# Patient Record
Sex: MALE | Race: OTHER | HISPANIC OR LATINO
[De-identification: names, ages, dates, MRNs, and addresses within clinical notes are randomized per-mention and may not be internally consistent; named-entity substitution may affect disease eponyms.]

---

## 2017-06-02 ENCOUNTER — APPOINTMENT (OUTPATIENT)
Dept: OTHER | Facility: CLINIC | Age: 62
End: 2017-06-02

## 2017-06-02 VITALS
SYSTOLIC BLOOD PRESSURE: 134 MMHG | OXYGEN SATURATION: 96 % | DIASTOLIC BLOOD PRESSURE: 83 MMHG | HEIGHT: 70 IN | WEIGHT: 185 LBS | BODY MASS INDEX: 26.48 KG/M2 | HEART RATE: 83 BPM

## 2017-06-02 RX ORDER — NIFEDIPINE 30 MG/1
30 TABLET, FILM COATED, EXTENDED RELEASE ORAL
Qty: 30 | Refills: 0 | Status: DISCONTINUED | COMMUNITY
Start: 2016-12-06

## 2017-06-02 RX ORDER — AZITHROMYCIN 250 MG/1
250 TABLET, FILM COATED ORAL
Qty: 6 | Refills: 0 | Status: DISCONTINUED | COMMUNITY
Start: 2017-03-02

## 2017-06-07 LAB
ALBUMIN SERPL ELPH-MCNC: 4.5 G/DL
ALP BLD-CCNC: 98 U/L
ALT SERPL-CCNC: 21 U/L
ANION GAP SERPL CALC-SCNC: 14 MMOL/L
APPEARANCE: ABNORMAL
AST SERPL-CCNC: 18 U/L
BASOPHILS # BLD AUTO: 0.02 K/UL
BASOPHILS NFR BLD AUTO: 0.3 %
BILIRUB SERPL-MCNC: 0.8 MG/DL
BILIRUBIN URINE: NEGATIVE
BLOOD URINE: NEGATIVE
BUN SERPL-MCNC: 18 MG/DL
CALCIUM SERPL-MCNC: 9.8 MG/DL
CHLORIDE SERPL-SCNC: 99 MMOL/L
CHOLEST SERPL-MCNC: 152 MG/DL
CHOLEST/HDLC SERPL: 2.2 RATIO
CO2 SERPL-SCNC: 27 MMOL/L
COLOR: YELLOW
CREAT SERPL-MCNC: 1.05 MG/DL
EOSINOPHIL # BLD AUTO: 0.04 K/UL
EOSINOPHIL NFR BLD AUTO: 0.7 %
GLUCOSE QUALITATIVE U: NORMAL MG/DL
GLUCOSE SERPL-MCNC: 194 MG/DL
HCT VFR BLD CALC: 47.4 %
HDLC SERPL-MCNC: 69 MG/DL
HGB BLD-MCNC: 15.8 G/DL
IMM GRANULOCYTES NFR BLD AUTO: 0.2 %
KETONES URINE: NEGATIVE
LDLC SERPL CALC-MCNC: 61 MG/DL
LEUKOCYTE ESTERASE URINE: ABNORMAL
LYMPHOCYTES # BLD AUTO: 1.8 K/UL
LYMPHOCYTES NFR BLD AUTO: 30.1 %
MAN DIFF?: NORMAL
MCHC RBC-ENTMCNC: 31.4 PG
MCHC RBC-ENTMCNC: 33.3 GM/DL
MCV RBC AUTO: 94.2 FL
MONOCYTES # BLD AUTO: 0.55 K/UL
MONOCYTES NFR BLD AUTO: 9.2 %
NEUTROPHILS # BLD AUTO: 3.57 K/UL
NEUTROPHILS NFR BLD AUTO: 59.5 %
NITRITE URINE: NEGATIVE
PH URINE: 5.5
PLATELET # BLD AUTO: 262 K/UL
POTASSIUM SERPL-SCNC: 4.5 MMOL/L
PROT SERPL-MCNC: 7.1 G/DL
PROTEIN URINE: NEGATIVE MG/DL
RBC # BLD: 5.03 M/UL
RBC # FLD: 13.6 %
SODIUM SERPL-SCNC: 140 MMOL/L
SPECIFIC GRAVITY URINE: 1.02
TRIGL SERPL-MCNC: 111 MG/DL
UROBILINOGEN URINE: NORMAL MG/DL
WBC # FLD AUTO: 5.99 K/UL

## 2017-11-14 ENCOUNTER — APPOINTMENT (OUTPATIENT)
Dept: PSYCHIATRY | Facility: CLINIC | Age: 62
End: 2017-11-14
Payer: COMMERCIAL

## 2017-11-14 PROCEDURE — 90834 PSYTX W PT 45 MINUTES: CPT

## 2018-11-12 ENCOUNTER — APPOINTMENT (OUTPATIENT)
Dept: OTHER | Facility: CLINIC | Age: 63
End: 2018-11-12
Payer: COMMERCIAL

## 2018-11-12 VITALS
HEART RATE: 56 BPM | BODY MASS INDEX: 27.2 KG/M2 | RESPIRATION RATE: 16 BRPM | HEIGHT: 70 IN | WEIGHT: 190 LBS | OXYGEN SATURATION: 96 % | DIASTOLIC BLOOD PRESSURE: 94 MMHG | SYSTOLIC BLOOD PRESSURE: 152 MMHG

## 2018-11-12 VITALS — SYSTOLIC BLOOD PRESSURE: 150 MMHG | DIASTOLIC BLOOD PRESSURE: 86 MMHG

## 2018-11-12 PROCEDURE — 94010 BREATHING CAPACITY TEST: CPT

## 2018-11-12 PROCEDURE — 99214 OFFICE O/P EST MOD 30 MIN: CPT | Mod: 25

## 2018-11-12 PROCEDURE — 99396 PREV VISIT EST AGE 40-64: CPT | Mod: 25

## 2018-11-12 PROCEDURE — 96150: CPT

## 2018-11-13 LAB
ALBUMIN SERPL ELPH-MCNC: 4.5 G/DL
ALP BLD-CCNC: 109 U/L
ALT SERPL-CCNC: 16 U/L
ANION GAP SERPL CALC-SCNC: 10 MMOL/L
APPEARANCE: CLEAR
AST SERPL-CCNC: 15 U/L
BACTERIA: NEGATIVE
BASOPHILS # BLD AUTO: 0.03 K/UL
BASOPHILS NFR BLD AUTO: 0.4 %
BILIRUB SERPL-MCNC: 0.6 MG/DL
BILIRUBIN URINE: NEGATIVE
BLOOD URINE: NEGATIVE
BUN SERPL-MCNC: 13 MG/DL
CALCIUM SERPL-MCNC: 9.8 MG/DL
CHLORIDE SERPL-SCNC: 103 MMOL/L
CHOLEST SERPL-MCNC: 166 MG/DL
CHOLEST/HDLC SERPL: 2.9 RATIO
CO2 SERPL-SCNC: 28 MMOL/L
COLOR: YELLOW
CREAT SERPL-MCNC: 1 MG/DL
EOSINOPHIL # BLD AUTO: 0.12 K/UL
EOSINOPHIL NFR BLD AUTO: 1.6 %
GLUCOSE QUALITATIVE U: NEGATIVE MG/DL
GLUCOSE SERPL-MCNC: 87 MG/DL
HCT VFR BLD CALC: 46.8 %
HDLC SERPL-MCNC: 57 MG/DL
HGB BLD-MCNC: 16 G/DL
HYALINE CASTS: 3 /LPF
IMM GRANULOCYTES NFR BLD AUTO: 0.3 %
KETONES URINE: NEGATIVE
LDLC SERPL CALC-MCNC: 75 MG/DL
LEUKOCYTE ESTERASE URINE: ABNORMAL
LYMPHOCYTES # BLD AUTO: 2.48 K/UL
LYMPHOCYTES NFR BLD AUTO: 32.6 %
MAN DIFF?: NORMAL
MCHC RBC-ENTMCNC: 31.9 PG
MCHC RBC-ENTMCNC: 34.2 GM/DL
MCV RBC AUTO: 93.4 FL
MICROSCOPIC-UA: NORMAL
MONOCYTES # BLD AUTO: 0.74 K/UL
MONOCYTES NFR BLD AUTO: 9.7 %
NEUTROPHILS # BLD AUTO: 4.22 K/UL
NEUTROPHILS NFR BLD AUTO: 55.4 %
NITRITE URINE: NEGATIVE
PH URINE: 6
PLATELET # BLD AUTO: 253 K/UL
POTASSIUM SERPL-SCNC: 4.8 MMOL/L
PROT SERPL-MCNC: 7.1 G/DL
PROTEIN URINE: NEGATIVE MG/DL
RBC # BLD: 5.01 M/UL
RBC # FLD: 14.3 %
RED BLOOD CELLS URINE: 2 /HPF
SODIUM SERPL-SCNC: 141 MMOL/L
SPECIFIC GRAVITY URINE: 1.02
SQUAMOUS EPITHELIAL CELLS: 2 /HPF
TRIGL SERPL-MCNC: 170 MG/DL
UROBILINOGEN URINE: NEGATIVE MG/DL
WBC # FLD AUTO: 7.61 K/UL
WHITE BLOOD CELLS URINE: 4 /HPF

## 2018-12-04 ENCOUNTER — FORM ENCOUNTER (OUTPATIENT)
Age: 63
End: 2018-12-04

## 2019-06-19 ENCOUNTER — FORM ENCOUNTER (OUTPATIENT)
Age: 64
End: 2019-06-19

## 2020-04-03 ENCOUNTER — APPOINTMENT (OUTPATIENT)
Dept: OTHER | Facility: CLINIC | Age: 65
End: 2020-04-03
Payer: COMMERCIAL

## 2020-04-03 PROCEDURE — 99396 PREV VISIT EST AGE 40-64: CPT

## 2020-04-03 PROCEDURE — 99441: CPT

## 2020-04-03 RX ORDER — SERTRALINE HYDROCHLORIDE 25 MG/1
TABLET, FILM COATED ORAL
Refills: 0 | Status: DISCONTINUED | COMMUNITY
End: 2020-04-03

## 2020-04-03 RX ORDER — ALBUTEROL 90 MCG
90 AEROSOL (GRAM) INHALATION
Refills: 0 | Status: DISCONTINUED | COMMUNITY
End: 2020-04-03

## 2020-04-03 NOTE — HEALTH RISK ASSESSMENT
[Patient reported colonoscopy was abnormal] : Patient reported colonoscopy was abnormal [ColonoscopyDate] : 2015

## 2020-04-03 NOTE — DISCUSSION/SUMMARY
[Patient seen for WTC Monitoring ___] : Patient was seen for WTC monitoring [unfilled] [Please See Note in Chart and Documentation in Trial DB] : Please see note in chart and documentation in Trial DB. [FreeTextEntry3] :  this encounter was conducted over  on the telephone during time of COVID 19 pandemic \par \par PCP: Dr. Ti Penny in \par Cards: Dr. 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 \par \par PMH/PSH: CAD s/p stent; EGD 2011\par Family History: no family hx of colon cancer \par Preventive Screening: last colonoscopy at age 59, had TA, need to repeat \par Allergies: NKDA\par Meds: see trial db and allscripts \par Soc Hx: 2 kids in good health \par Smoking Status: never \par Review of Systems—IAMQ reviewed with patient\par \par PE: to be performed at a later date during face to face encounter to complete WTC monitoring visit \par \par \par Results:\par \par CXR: to be refered  at a later date during face to face encounter to complete WTC monitoring visit\par \par \par Spirometry: this encounter was conducted over  on the telephone during time of COVID 19 pandemic\par \par \par A/P: CBC, CMP, lipids, UA ordered, agreeable to colonoscopy;\par see Occ Med note for details \par

## 2020-04-21 ENCOUNTER — TRANSCRIPTION ENCOUNTER (OUTPATIENT)
Age: 65
End: 2020-04-21

## 2020-07-31 ENCOUNTER — APPOINTMENT (OUTPATIENT)
Dept: GASTROENTEROLOGY | Facility: CLINIC | Age: 65
End: 2020-07-31

## 2020-11-30 NOTE — PAST MEDICAL HISTORY
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

## 2020-11-30 NOTE — REASON FOR VISIT
[Follow-Up] : a follow-up visit [FreeTextEntry1] : asthma and GERD - certified by [employer] as WTC related

## 2020-11-30 NOTE — HISTORY OF PRESENT ILLNESS
[Home] : at home, [unfilled] , at the time of the visit. [Other Location: e.g. Home (Enter Location, City,State)___] : at [unfilled] [FreeTextEntry1] : asthma - using  Ventolin as needed \par  few times per week \par  GERD - taking medication daily to prevent Sx

## 2020-12-28 ENCOUNTER — APPOINTMENT (OUTPATIENT)
Dept: OTHER | Facility: CLINIC | Age: 65
End: 2020-12-28
Payer: COMMERCIAL

## 2020-12-28 VITALS
WEIGHT: 195 LBS | HEIGHT: 70 IN | TEMPERATURE: 97.2 F | BODY MASS INDEX: 27.92 KG/M2 | SYSTOLIC BLOOD PRESSURE: 166 MMHG | OXYGEN SATURATION: 94 % | DIASTOLIC BLOOD PRESSURE: 95 MMHG | RESPIRATION RATE: 15 BRPM | HEART RATE: 66 BPM

## 2020-12-28 PROCEDURE — 99214 OFFICE O/P EST MOD 30 MIN: CPT

## 2020-12-28 RX ORDER — PNEUMOCOCCAL 23-VAL P-SAC VAC 25MCG/0.5
25 VIAL (ML) INJECTION
Qty: 1 | Refills: 0 | Status: ACTIVE | COMMUNITY
Start: 2020-09-17

## 2020-12-28 NOTE — HISTORY OF PRESENT ILLNESS
[FreeTextEntry1] : asthma - using  Ventolin as needed 2-3 times per day for chest tightness \par  worse lately since dry hear in the house i son \par no cough \par  nonwheezing \par \par worsening of  GERD - since not  taking medication daily to prevent Sx, martinez at night \par  that causing chest tightness too \par \par  no diarrhea, \par  no n/v\par  no dysphagia colonoscopy 04 2015 - had TA \par \par  never had ER/UC visits for asthma \par  never treated with PO steroids for asthma

## 2020-12-28 NOTE — ASSESSMENT
[FreeTextEntry1] : worsening asthma- cont PRN Ventolin \par  add Breo daily \par \par GERD- meds renewed \par  rec to take daily for 6 weeks and then will re assess \par diet and weight loss discussed \par \par high BP - recommend to follow up with his PCP or cardiologist to control better

## 2021-01-12 LAB
ALBUMIN SERPL ELPH-MCNC: 4.7 G/DL
ALP BLD-CCNC: 104 U/L
ALT SERPL-CCNC: 19 U/L
ANION GAP SERPL CALC-SCNC: 11 MMOL/L
APPEARANCE: CLEAR
AST SERPL-CCNC: 16 U/L
BACTERIA: NEGATIVE
BASOPHILS # BLD AUTO: 0.06 K/UL
BASOPHILS NFR BLD AUTO: 0.6 %
BILIRUB SERPL-MCNC: 0.5 MG/DL
BILIRUBIN URINE: NEGATIVE
BLOOD URINE: NEGATIVE
BUN SERPL-MCNC: 23 MG/DL
CALCIUM SERPL-MCNC: 10 MG/DL
CHLORIDE SERPL-SCNC: 102 MMOL/L
CHOLEST SERPL-MCNC: 175 MG/DL
CO2 SERPL-SCNC: 29 MMOL/L
COLOR: YELLOW
CREAT SERPL-MCNC: 1.28 MG/DL
EOSINOPHIL # BLD AUTO: 0.1 K/UL
EOSINOPHIL NFR BLD AUTO: 1 %
GLUCOSE QUALITATIVE U: NEGATIVE
GLUCOSE SERPL-MCNC: 85 MG/DL
HCT VFR BLD CALC: 53.4 %
HDLC SERPL-MCNC: 57 MG/DL
HGB BLD-MCNC: 17.1 G/DL
HYALINE CASTS: 2 /LPF
IMM GRANULOCYTES NFR BLD AUTO: 0.4 %
KETONES URINE: NEGATIVE
LDLC SERPL CALC-MCNC: 46 MG/DL
LEUKOCYTE ESTERASE URINE: NEGATIVE
LYMPHOCYTES # BLD AUTO: 2.75 K/UL
LYMPHOCYTES NFR BLD AUTO: 27 %
MAN DIFF?: NORMAL
MCHC RBC-ENTMCNC: 31.7 PG
MCHC RBC-ENTMCNC: 32 GM/DL
MCV RBC AUTO: 98.9 FL
MICROSCOPIC-UA: NORMAL
MONOCYTES # BLD AUTO: 1.16 K/UL
MONOCYTES NFR BLD AUTO: 11.4 %
NEUTROPHILS # BLD AUTO: 6.08 K/UL
NEUTROPHILS NFR BLD AUTO: 59.6 %
NITRITE URINE: NEGATIVE
NONHDLC SERPL-MCNC: 118 MG/DL
PH URINE: 6
PLATELET # BLD AUTO: 305 K/UL
POTASSIUM SERPL-SCNC: 4.6 MMOL/L
PROT SERPL-MCNC: 7.4 G/DL
PROTEIN URINE: NORMAL
RBC # BLD: 5.4 M/UL
RBC # FLD: 13.3 %
RED BLOOD CELLS URINE: 2 /HPF
SODIUM SERPL-SCNC: 141 MMOL/L
SPECIFIC GRAVITY URINE: 1.02
SQUAMOUS EPITHELIAL CELLS: 2 /HPF
TRIGL SERPL-MCNC: 363 MG/DL
UROBILINOGEN URINE: NORMAL
WBC # FLD AUTO: 10.19 K/UL
WHITE BLOOD CELLS URINE: 13 /HPF

## 2021-02-11 ENCOUNTER — APPOINTMENT (OUTPATIENT)
Dept: OTHER | Facility: CLINIC | Age: 66
End: 2021-02-11
Payer: COMMERCIAL

## 2021-02-11 PROCEDURE — 99442: CPT | Mod: 95

## 2021-02-11 RX ORDER — FLUTICASONE FUROATE AND VILANTEROL TRIFENATATE 100; 25 UG/1; UG/1
100-25 POWDER RESPIRATORY (INHALATION)
Qty: 1 | Refills: 1 | Status: DISCONTINUED | COMMUNITY
Start: 2020-12-28 | End: 2021-02-11

## 2021-02-11 NOTE — HISTORY OF PRESENT ILLNESS
[Home] : at home, [unfilled] , at the time of the visit. [Medical Office: ([facility])___] : at the medical office located in  [Verbal consent obtained from patient] : the patient, [unfilled] [FreeTextEntry1] : asthma - using  Ventolin as needed 2  times per day for chest tightness \par  used Breo for 3 days but had very high BP and he stopped \par  his BP is not well controlled in General \par \par started taking Omeprazole for   GERD \par  but feels that  chest tightnness is worse while laying on left side \par  no diarrhea, \par  no n/v\par  no dysphagia \par \par \par colonoscopy 04 2015 - had TA \par \par  never had ER/UC visits for asthma \par  never treated with PO steroids for asthma

## 2021-02-11 NOTE — ASSESSMENT
[FreeTextEntry1] : worsening asthma- cont PRN Ventolin \par  stated that when started on  Breo daily - had uncontrolled BP \par is BP was elevated last visit \par  he stated that he has a follow up tomorrow with PCP re BP control and HTN treatment \par \par GERD- meds \par  rec to take daily OMeprazole \par diet and weight loss discussed \par GERD control with diet, weight loss and life style changes discussed \par \par  Foods and drinks that have been commonly linked to GERD symptoms include;\par \par •acidic foods, such as citrus fruits and tomatoes\par •alcoholic drinks\par •chocolate\par •coffee and other sources of caffeine\par •high-fat foods\par •mint\par •spicy foods\par Carbonated beverages \par Lifestyle Changes\par \par Avoid lying down for at least two hours after a meal or after drinking acidic beverages, like soda, or other caffeinated beverages. This can help to prevent stomach contents from flowing back into the esophagus. \par Keep your head elevated while you sleep. Using an extra pillow or two can also help to prevent reflux. \par Eat smaller and more frequent meals each day instead of a few large meals. This promotes digestion and can aid in preventing heartburn. \par Wear loose-fitting clothes to ease pressure on the stomach, which can worsen heartburn and reflux.\par \par Reduce excess weight around the midsection. This can ease pressure on the stomach. Such pressure can force some stomach contents back up the esophagus\par \par \par

## 2021-02-28 ENCOUNTER — APPOINTMENT (OUTPATIENT)
Dept: DISASTER EMERGENCY | Facility: CLINIC | Age: 66
End: 2021-02-28

## 2021-03-01 LAB — SARS-COV-2 N GENE NPH QL NAA+PROBE: NOT DETECTED

## 2021-03-04 ENCOUNTER — APPOINTMENT (OUTPATIENT)
Dept: PULMONOLOGY | Facility: CLINIC | Age: 66
End: 2021-03-04
Payer: COMMERCIAL

## 2021-03-04 VITALS — BODY MASS INDEX: 28.02 KG/M2 | HEART RATE: 65 BPM | HEIGHT: 68.5 IN | WEIGHT: 187 LBS | TEMPERATURE: 98.5 F

## 2021-03-04 VITALS
WEIGHT: 188 LBS | HEART RATE: 63 BPM | RESPIRATION RATE: 17 BRPM | BODY MASS INDEX: 28.17 KG/M2 | HEIGHT: 68.5 IN | DIASTOLIC BLOOD PRESSURE: 90 MMHG | TEMPERATURE: 97.5 F | OXYGEN SATURATION: 97 % | SYSTOLIC BLOOD PRESSURE: 155 MMHG

## 2021-03-04 DIAGNOSIS — G47.33 OBSTRUCTIVE SLEEP APNEA (ADULT) (PEDIATRIC): ICD-10-CM

## 2021-03-04 PROCEDURE — 94726 PLETHYSMOGRAPHY LUNG VOLUMES: CPT

## 2021-03-04 PROCEDURE — 94010 BREATHING CAPACITY TEST: CPT

## 2021-03-04 PROCEDURE — 99203 OFFICE O/P NEW LOW 30 MIN: CPT | Mod: 25

## 2021-03-04 PROCEDURE — 94729 DIFFUSING CAPACITY: CPT

## 2021-03-04 PROCEDURE — ZZZZZ: CPT

## 2021-03-17 PROBLEM — G47.33 OBSTRUCTIVE SLEEP APNEA, ADULT: Status: ACTIVE | Noted: 2021-03-11

## 2021-03-17 NOTE — PHYSICAL EXAM
[No Acute Distress] : no acute distress [Low Lying Soft Palate] : low lying soft palate [Enlarged Base of the Tongue] : enlarged base of the tongue [II] : Mallampati Class: II [Normal Appearance] : normal appearance [No Neck Mass] : no neck mass [Normal Rate/Rhythm] : normal rate/rhythm [Normal S1, S2] : normal s1, s2 [No Murmurs] : no murmurs [No Resp Distress] : no resp distress [Clear to Auscultation Bilaterally] : clear to auscultation bilaterally [No Abnormalities] : no abnormalities [Benign] : benign [Normal Gait] : normal gait [No Clubbing] : no clubbing [No Cyanosis] : no cyanosis [No Edema] : no edema [FROM] : FROM [Normal Color/ Pigmentation] : normal color/ pigmentation [No Focal Deficits] : no focal deficits [Oriented x3] : oriented x3 [Normal Affect] : normal affect

## 2021-03-17 NOTE — ASSESSMENT
[FreeTextEntry1] : 64 yo M h/o asthma, GERD, WTC exposure presents for initial visit for asthma. Also with symptoms concerning for sleep-disordered breathing.\par \par 1. Asthma - \par Avoid known triggers\par Start Singulair, Ventolin prn and prior to exercise\par Pulmicort BID\par \par 2.EUGENE - \par Will send the patient for a diagnostic HST at the [facility] sleep disorders center to evaluate for the presence of sleep disordered breathing. Explained the rationale for diagnosis and treatment of sleep apnea including its effect on quality of life and long term cardiovascular risk. \par Above discussed at length, all questions answered, he appears to understand and will call for results 1 week after completion of the study.\par \par

## 2021-03-17 NOTE — HISTORY OF PRESENT ILLNESS
[Never] : never [TextBox_4] : 64 yo M h/o asthma, GERD, WTC exposure presents for initial visit for asthma\par Referred by Dr. Aldridge\par PMH: CAD with stent 8 years, HTN on 5 meds, \par Recently noted that BP is elevated in the morning\par \par Maintained on Ventolin\par When started on Breo had uncontrolled BP. \par BP is 155/90 today\par PFTs today: Normal, bronchodilator challenge not performed\par \par Diagnosed with asthma approx 10 years ago - was diagnosed in [place] and started on Albuterol at that time\par Waking up SOB\par CORONEL when ambulating but attributes to mask? Using Albuterol 2-3x a day. \par Denies wheezing or cough.  \par Last steroids 10 years ago. \par Triggers: strong odors, pollen\par \par Sleep history:\par Main complaints of nonrestorative sleep, severe snoring and daytime somnolence.\par Snores with witnessed apneas per wife \par Bed: 11 pm, no difficultly falling asleep, wakes 2-3 to urinate, often choking/gasping, out of bed at 7 am \par Unrefreshed upon awakening. \par Morning headaches: denies\par Dry mouth/throat: yes\par Weight trend: stable\par Denies drowsy driving, denies any accidents or near accidents. \par EDS with ESS of 8\par \par \par  \par

## 2021-03-17 NOTE — REVIEW OF SYSTEMS
[Fever] : no fever [Fatigue] : no fatigue [Recent Wt Gain (___ Lbs)] : ~T no recent weight gain [EDS] : eds [Chills] : no chills [Poor Appetite] : no poor appetite [Recent Wt Loss (___ Lbs)] : ~T no recent weight loss [Seasonal Allergies] : seasonal allergies [GERD] : gerd [Nocturia] : nocturia [Frequency] : dysuria [Negative] : Endocrine [TextBox_30] : per HPI

## 2021-04-01 ENCOUNTER — FORM ENCOUNTER (OUTPATIENT)
Age: 66
End: 2021-04-01

## 2021-04-02 ENCOUNTER — APPOINTMENT (OUTPATIENT)
Dept: SLEEP CENTER | Facility: CLINIC | Age: 66
End: 2021-04-02

## 2021-04-08 ENCOUNTER — APPOINTMENT (OUTPATIENT)
Dept: OTHER | Facility: CLINIC | Age: 66
End: 2021-04-08
Payer: COMMERCIAL

## 2021-04-08 PROCEDURE — 99442: CPT | Mod: 95

## 2021-04-08 PROCEDURE — 99396 PREV VISIT EST AGE 40-64: CPT | Mod: 95

## 2021-04-08 NOTE — HISTORY OF PRESENT ILLNESS
[Home] : at home, [unfilled] , at the time of the visit. [Medical Office: ([facility])___] : at the medical office located in  [Verbal consent obtained from patient] : the patient, [unfilled] [FreeTextEntry1] : asthma\par  was evaluated by PULM \par  started on Pulmicort 90 MCG/ACT BID but using inly once a day \par  and Montelukast \par  - using  Ventolin as needed much less frequent \par \par  last used it a week ago \par \par GERD \par  having heartburn in the evening \par \par started taking Omeprazole for   GERD but only takes it as needed upon recommendations of his PCP\par  has Sx daily \par  eats large  dinner late \par \par  no diarrhea, \par  no n/v\par  no dysphagia \par \par \par colonoscopy 04 2015 - had TA \par \par  never had ER/UC visits for asthma \par  never treated with PO steroids for asthma

## 2021-04-08 NOTE — HEALTH RISK ASSESSMENT
[Patient reported colonoscopy was abnormal] : Patient reported colonoscopy was abnormal [ColonoscopyDate] : 2015 [ColonoscopyComments] : tubular adenoma

## 2021-04-08 NOTE — HISTORY OF PRESENT ILLNESS
[Home] : at home, [unfilled] , at the time of the visit. [Medical Office: ([facility])___] : at the medical office located in  [Verbal consent obtained from patient] : the patient, [unfilled] [FreeTextEntry1] : asthma\par  was evaluated by PULM \par  started on Pulmicort 90 MCG/ACT BID but using inly once a day \par  and Montelukast \par  - using  Ventolin as needed much less frequent \par \par  last used it a week ago \par \par GERD \par  having heartburn in the evening \par \par started taking Omeprazole for   GERD but only takes it as needed upon recommendations of his PCP\par  has Sx daily \par  eats large  dinner late \par \par  no diarrhea, \par  no n/v\par  no dysphagia \par \par \par colonoscopy 04 2015 - had TA \par \par  never had ER/UC visits for asthma \par  never treated with PO steroids for asthma

## 2021-04-08 NOTE — DISCUSSION/SUMMARY
[Home] : at home, [unfilled] , at the time of the visit. [Medical Office: ([facility])___] : at the medical office located in  [Patient seen for WTC Monitoring ___] : Patient was seen for WTC monitoring [unfilled] [Please See Note in Chart and Documentation in Trial DB] : Please see note in chart and documentation in Trial DB. [FreeTextEntry3] : patient stated that he has neck pain radiation to the shoulder \par pain is worse with moving his neck \par no pain on shoulder range of movement \par  pain have been present for 1 month \par PCP: Dr. Ti Penny in \par Cards: Dr. 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 \par \par PMH/PSH: CAD s/p stent; EGD 2011\par Family History: no family hx of colon cancer \par Preventive Screening: last colonoscopy at age 59, had TA, need to repeat \par Allergies: NKDA\par Meds: see trial db and allscripts \par Soc Hx: 2 kids in good health \par Smoking Status: never \par Review of Systems—IAMQ reviewed with patient\par \par \par \par Results:\par \par \par \par Spirometry: this encounter was conducted over  on the telephone during time of COVID 19 pandemic\par \par \par A/P: WTC annual MV\par see Occ Med note for details \par neck and shoulder pain \par needs to be examined in person - recommend to follow up with his PCP for exam and further imaging \par \par

## 2021-04-08 NOTE — ASSESSMENT
[FreeTextEntry1] : asthma- patient reports much better  control since started on Pulmicort and Montelukast \par \par \par GERD- meds \par  rec to take daily OMeprazole 40 mg 30 min before lung daily for 2 weeks and then every other day \par 2 weeks then can stop \par diet and weight loss discussed \par GERD control with diet, weight loss and life style changes discussed \par \par  Foods and drinks that have been commonly linked to GERD symptoms include;\par \par •acidic foods, such as citrus fruits and tomatoes\par •alcoholic drinks\par •chocolate\par •coffee and other sources of caffeine\par •high-fat foods\par •mint\par •spicy foods\par Carbonated beverages \par Lifestyle Changes\par \par Avoid lying down for at least two hours after a meal or after drinking acidic beverages, like soda, or other caffeinated beverages. This can help to prevent stomach contents from flowing back into the esophagus. \par Keep your head elevated while you sleep. Using an extra pillow or two can also help to prevent reflux. \par Eat smaller and more frequent meals each day instead of a few large meals. This promotes digestion and can aid in preventing heartburn. \par Wear loose-fitting clothes to ease pressure on the stomach, which can worsen heartburn and reflux.\par \par Reduce excess weight around the midsection. This can ease pressure on the stomach. Such pressure can force some stomach contents back up the esophagus\par \par patient has GI appointment in 06 2021 \par  needs colonoscopy due to TA in 04 2015 \par \par \par he missed his PSG and needs to reschedule \par \par

## 2021-04-08 NOTE — DISCUSSION/SUMMARY
[Home] : at home, [unfilled] , at the time of the visit. [Medical Office: ([facility])___] : at the medical office located in  [Patient seen for WTC Monitoring ___] : Patient was seen for WTC monitoring [unfilled] [Please See Note in Chart and Documentation in Trial DB] : Please see note in chart and documentation in Trial DB. [FreeTextEntry3] : patient stated that he has neck pain radiation to the shoulder \par pain is worse with moving his neck \par no pain on shoulder range of movement \par  pain have been present for 1 month \par PCP: Dr. Ti Penny in \par Cards: Dr. 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 \par \par PMH/PSH: CAD s/p stent; EGD 2011\par Family History: no family hx of colon cancer \par Preventive Screening: last colonoscopy at age 59, had TA, need to repeat \par Allergies: NKDA\par Meds: see trial db and allscripts \par Soc Hx: 2 kids in good health \par Smoking Status: never \par Review of Systems—IAMQ reviewed with patient\par \par \par \par Results:\par \par \par \par Spirometry: this encounter was conducted over  on the telephone during time of COVID 19 pandemic\par \par \par A/P: WTC annual MV\par see Occ Med note for details \par neck and shoulder pain \par needs to be examined in person - recommend to follow up with his PCP for exam and further imaging \par \par

## 2021-06-25 ENCOUNTER — APPOINTMENT (OUTPATIENT)
Dept: GASTROENTEROLOGY | Facility: CLINIC | Age: 66
End: 2021-06-25
Payer: COMMERCIAL

## 2021-06-25 ENCOUNTER — NON-APPOINTMENT (OUTPATIENT)
Age: 66
End: 2021-06-25

## 2021-06-25 VITALS
SYSTOLIC BLOOD PRESSURE: 120 MMHG | HEIGHT: 68.5 IN | TEMPERATURE: 97.5 F | HEART RATE: 66 BPM | BODY MASS INDEX: 27.27 KG/M2 | OXYGEN SATURATION: 97 % | DIASTOLIC BLOOD PRESSURE: 76 MMHG | RESPIRATION RATE: 16 BRPM | WEIGHT: 182 LBS

## 2021-06-25 PROCEDURE — 99203 OFFICE O/P NEW LOW 30 MIN: CPT

## 2021-06-25 NOTE — PHYSICAL EXAM
[General Appearance - Alert] : alert [General Appearance - In No Acute Distress] : in no acute distress [Sclera] : the sclera and conjunctiva were normal [PERRL With Normal Accommodation] : pupils were equal in size, round, and reactive to light [Extraocular Movements] : extraocular movements were intact [Oropharynx] : the oropharynx was normal [Outer Ear] : the ears and nose were normal in appearance [Neck Appearance] : the appearance of the neck was normal [Neck Cervical Mass (___cm)] : no neck mass was observed [Jugular Venous Distention Increased] : there was no jugular-venous distention [Thyroid Diffuse Enlargement] : the thyroid was not enlarged [Thyroid Nodule] : there were no palpable thyroid nodules [Auscultation Breath Sounds / Voice Sounds] : lungs were clear to auscultation bilaterally [Heart Rate And Rhythm] : heart rate was normal and rhythm regular [Heart Sounds] : normal S1 and S2 [Heart Sounds Gallop] : no gallops [Murmurs] : no murmurs [Edema] : there was no peripheral edema [Heart Sounds Pericardial Friction Rub] : no pericardial rub [Bowel Sounds] : normal bowel sounds [Abdomen Soft] : soft [Abdomen Tenderness] : non-tender [Abdomen Mass (___ Cm)] : no abdominal mass palpated [Cervical Lymph Nodes Enlarged Posterior Bilaterally] : posterior cervical [Cervical Lymph Nodes Enlarged Anterior Bilaterally] : anterior cervical [Supraclavicular Lymph Nodes Enlarged Bilaterally] : supraclavicular [Axillary Lymph Nodes Enlarged Bilaterally] : axillary [Femoral Lymph Nodes Enlarged Bilaterally] : femoral [Inguinal Lymph Nodes Enlarged Bilaterally] : inguinal [No CVA Tenderness] : no ~M costovertebral angle tenderness [Abnormal Walk] : normal gait [No Spinal Tenderness] : no spinal tenderness [Nail Clubbing] : no clubbing  or cyanosis of the fingernails [Musculoskeletal - Swelling] : no joint swelling seen [Skin Color & Pigmentation] : normal skin color and pigmentation [Motor Tone] : muscle strength and tone were normal [Skin Turgor] : normal skin turgor [] : no rash [Oriented To Time, Place, And Person] : oriented to person, place, and time [Impaired Insight] : insight and judgment were intact [Affect] : the affect was normal

## 2021-06-25 NOTE — ASSESSMENT
[FreeTextEntry1] : 65-year-old man severe reflux cannot rule out esophagitis\par History of polyps due for surveillance\par Constipation likely functional\par \par Plan\par Indications risks benefits and alternatives to upper endoscopy and colonoscopy reviewed\par Patient agreeable to examination\par Patient encouraged to use omeprazole daily as prescribed\par Patient instructed not to discontinue aspirin prior to colonoscopy\par Patient provided us with the phone number for his primary physician Dr. Penny, [phone] to obtain results of stress test\par Patient cannot recall the name of his new cardiologist\par Given his constipation patient understands to use bottle of magnesium citrate 2 nights before standard bowel preparation for his colonoscopy

## 2021-06-25 NOTE — HISTORY OF PRESENT ILLNESS
[de-identified] : 65-year-old male history of acid reflux presents for follow-up colonoscopy, history of colon polyps\par \par Daily reflux complaints, cautioned by his primary care physician not to use omeprazole\par Patient denies dysphagia\par Patient with mild chronic constipation\par Denies bleeding\par Denies family history of colon cancer\par \par Last upper endoscopy and colonoscopy 2015\par \par History of coronary disease, stent placed 9 years ago denies dysrhythmia or heart failure\par Reports recent negative stress test, report unavailable at this time\par \par Social history: Retired from [employer]

## 2021-10-16 ENCOUNTER — APPOINTMENT (OUTPATIENT)
Dept: DISASTER EMERGENCY | Facility: CLINIC | Age: 66
End: 2021-10-16

## 2021-10-18 ENCOUNTER — RESULT REVIEW (OUTPATIENT)
Age: 66
End: 2021-10-18

## 2021-10-18 ENCOUNTER — APPOINTMENT (OUTPATIENT)
Dept: GASTROENTEROLOGY | Facility: HOSPITAL | Age: 66
End: 2021-10-18

## 2021-10-18 ENCOUNTER — OUTPATIENT (OUTPATIENT)
Dept: OUTPATIENT SERVICES | Facility: HOSPITAL | Age: 66
LOS: 1 days | End: 2021-10-18
Payer: COMMERCIAL

## 2021-10-18 VITALS
OXYGEN SATURATION: 96 % | DIASTOLIC BLOOD PRESSURE: 76 MMHG | RESPIRATION RATE: 18 BRPM | SYSTOLIC BLOOD PRESSURE: 126 MMHG | HEART RATE: 81 BPM

## 2021-10-18 VITALS
OXYGEN SATURATION: 97 % | DIASTOLIC BLOOD PRESSURE: 76 MMHG | HEART RATE: 74 BPM | WEIGHT: 179.9 LBS | SYSTOLIC BLOOD PRESSURE: 169 MMHG | TEMPERATURE: 98 F | HEIGHT: 70 IN

## 2021-10-18 DIAGNOSIS — Z86.010 PERSONAL HISTORY OF COLONIC POLYPS: ICD-10-CM

## 2021-10-18 DIAGNOSIS — N20.0 CALCULUS OF KIDNEY: Chronic | ICD-10-CM

## 2021-10-18 DIAGNOSIS — K21.9 GASTRO-ESOPHAGEAL REFLUX DISEASE WITHOUT ESOPHAGITIS: ICD-10-CM

## 2021-10-18 LAB — SARS-COV-2 N GENE NPH QL NAA+PROBE: NOT DETECTED

## 2021-10-18 PROCEDURE — 43235 EGD DIAGNOSTIC BRUSH WASH: CPT

## 2021-10-18 PROCEDURE — 88305 TISSUE EXAM BY PATHOLOGIST: CPT | Mod: 26

## 2021-10-18 PROCEDURE — 88305 TISSUE EXAM BY PATHOLOGIST: CPT

## 2021-10-18 PROCEDURE — 45385 COLONOSCOPY W/LESION REMOVAL: CPT

## 2021-10-18 PROCEDURE — 45385 COLONOSCOPY W/LESION REMOVAL: CPT | Mod: 33

## 2021-10-18 RX ORDER — SODIUM CHLORIDE 9 MG/ML
500 INJECTION INTRAMUSCULAR; INTRAVENOUS; SUBCUTANEOUS
Refills: 0 | Status: COMPLETED | OUTPATIENT
Start: 2021-10-18 | End: 2021-10-18

## 2021-10-18 RX ADMIN — SODIUM CHLORIDE 30 MILLILITER(S): 9 INJECTION INTRAMUSCULAR; INTRAVENOUS; SUBCUTANEOUS at 12:11

## 2021-10-18 NOTE — ASU PATIENT PROFILE, ADULT - NSICDXPASTMEDICALHX_GEN_ALL_CORE_FT
PAST MEDICAL HISTORY:  BPH (benign prostatic hyperplasia)     GERD (gastroesophageal reflux disease)     HLD (hyperlipidemia)     HTN (hypertension)     S/P coronary artery stent placement

## 2021-10-18 NOTE — ASU PATIENT PROFILE, ADULT - PAIN DESCRIPTION (FREQUENCY/QUALITY), PROFILE
Size Of Lesion In Cm (Optional): 0 Detail Level: Simple Detail Level: Detailed Size Of Lesion: 3mm Size Of Lesion: 2mm constant

## 2021-10-18 NOTE — PRE PROCEDURE NOTE - PRE PROCEDURE EVALUATION
Attending Physician:        López Ruiz MD                    Procedure:    Indication for Procedure: heartburn and history of polyps  ________________________________________________________  PAST MEDICAL & SURGICAL HISTORY:  HTN (hypertension)    HLD (hyperlipidemia)    BPH (benign prostatic hyperplasia)    S/P coronary artery stent placement    GERD (gastroesophageal reflux disease)    Kidney stones      ALLERGIES:    HOME MEDICATIONS:  Aerospan 80 mcg/inh inhalation aerosol:   aspirin 325 mg oral tablet: orally once a day  Flomax 0.4 mg oral capsule: 1 cap(s) orally once a day  Lipitor 10 mg oral tablet: 1 tab(s) orally once a day  losartan 100 mg oral tablet: 1 tab(s) orally once a day  metoprolol succinate 50 mg oral tablet, extended release: 1 tab(s) orally once a day  montelukast 10 mg oral tablet: 1 tab(s) orally once a day  Nifedical XL 60 mg oral tablet, extended release: 1 tab(s) orally once a day  PriLOSEC 40 mg oral delayed release capsule: 1 cap(s) orally once a day  Ventolin HFA 90 mcg/inh inhalation aerosol: 2 puff(s) inhaled every 6 hours    AICD/PPM: [ ] yes   [ x] no    PERTINENT LAB DATA:                      PHYSICAL EXAMINATION:    Height (cm): 177.8  Weight (kg): 81.6  BMI (kg/m2): 25.8  BSA (m2): 2T(C): --  HR: --  BP: --  RR: --  SpO2: --    Constitutional: NAD  HEENT: PERRLA, EOMI,    Neck:  No JVD  Respiratory: CTAB/L  Cardiovascular: S1 and S2  Gastrointestinal: BS+, soft, NT/ND  Extremities: No peripheral edema  Neurological: A/O x 3, no focal deficits  Psychiatric: Normal mood, normal affect  Skin: No rashes    ASA Class: I [ ]  II [x ]  III [ ]  IV [ ]    COMMENTS:    The patient is a suitable candidate for the planned procedure unless box checked [ ]  No, explain:

## 2021-10-19 ENCOUNTER — NON-APPOINTMENT (OUTPATIENT)
Age: 66
End: 2021-10-19

## 2021-10-19 LAB — SURGICAL PATHOLOGY STUDY: SIGNIFICANT CHANGE UP

## 2022-05-20 PROBLEM — I10 ESSENTIAL (PRIMARY) HYPERTENSION: Chronic | Status: ACTIVE | Noted: 2021-10-18

## 2022-05-20 PROBLEM — K21.9 GASTRO-ESOPHAGEAL REFLUX DISEASE WITHOUT ESOPHAGITIS: Chronic | Status: ACTIVE | Noted: 2021-10-18

## 2022-05-20 PROBLEM — N40.0 BENIGN PROSTATIC HYPERPLASIA WITHOUT LOWER URINARY TRACT SYMPTOMS: Chronic | Status: ACTIVE | Noted: 2021-10-18

## 2022-05-20 PROBLEM — Z95.5 PRESENCE OF CORONARY ANGIOPLASTY IMPLANT AND GRAFT: Chronic | Status: ACTIVE | Noted: 2021-10-18

## 2022-05-20 PROBLEM — E78.5 HYPERLIPIDEMIA, UNSPECIFIED: Chronic | Status: ACTIVE | Noted: 2021-10-18

## 2022-06-09 ENCOUNTER — APPOINTMENT (OUTPATIENT)
Dept: OTHER | Facility: CLINIC | Age: 67
End: 2022-06-09
Payer: COMMERCIAL

## 2022-06-09 VITALS
BODY MASS INDEX: 25.2 KG/M2 | HEIGHT: 70 IN | WEIGHT: 176 LBS | DIASTOLIC BLOOD PRESSURE: 76 MMHG | SYSTOLIC BLOOD PRESSURE: 125 MMHG | TEMPERATURE: 97.5 F | OXYGEN SATURATION: 96 % | RESPIRATION RATE: 16 BRPM | HEART RATE: 75 BPM

## 2022-06-09 PROCEDURE — 99397 PER PM REEVAL EST PAT 65+ YR: CPT | Mod: 25

## 2022-06-09 PROCEDURE — 99213 OFFICE O/P EST LOW 20 MIN: CPT | Mod: 25

## 2022-06-10 LAB
ALBUMIN SERPL ELPH-MCNC: 4.6 G/DL
ALP BLD-CCNC: 84 U/L
ALT SERPL-CCNC: 22 U/L
ANION GAP SERPL CALC-SCNC: 14 MMOL/L
AST SERPL-CCNC: 18 U/L
BASOPHILS # BLD AUTO: 0.04 K/UL
BASOPHILS NFR BLD AUTO: 0.5 %
BILIRUB SERPL-MCNC: 0.8 MG/DL
BUN SERPL-MCNC: 19 MG/DL
CALCIUM SERPL-MCNC: 9.6 MG/DL
CHLORIDE SERPL-SCNC: 103 MMOL/L
CHOLEST SERPL-MCNC: 145 MG/DL
CO2 SERPL-SCNC: 24 MMOL/L
CREAT SERPL-MCNC: 1.01 MG/DL
EGFR: 82 ML/MIN/1.73M2
EOSINOPHIL # BLD AUTO: 0.03 K/UL
EOSINOPHIL NFR BLD AUTO: 0.4 %
GLUCOSE SERPL-MCNC: 143 MG/DL
HCT VFR BLD CALC: 47.3 %
HDLC SERPL-MCNC: 72 MG/DL
HGB BLD-MCNC: 16 G/DL
IMM GRANULOCYTES NFR BLD AUTO: 0.3 %
LDLC SERPL CALC-MCNC: 52 MG/DL
LYMPHOCYTES # BLD AUTO: 1.49 K/UL
LYMPHOCYTES NFR BLD AUTO: 19.2 %
MAN DIFF?: NORMAL
MCHC RBC-ENTMCNC: 32 PG
MCHC RBC-ENTMCNC: 33.8 GM/DL
MCV RBC AUTO: 94.6 FL
MONOCYTES # BLD AUTO: 0.62 K/UL
MONOCYTES NFR BLD AUTO: 8 %
NEUTROPHILS # BLD AUTO: 5.56 K/UL
NEUTROPHILS NFR BLD AUTO: 71.6 %
NONHDLC SERPL-MCNC: 73 MG/DL
PLATELET # BLD AUTO: 319 K/UL
POTASSIUM SERPL-SCNC: 4.2 MMOL/L
PROT SERPL-MCNC: 7 G/DL
RBC # BLD: 5 M/UL
RBC # FLD: 13.5 %
SODIUM SERPL-SCNC: 140 MMOL/L
TRIGL SERPL-MCNC: 105 MG/DL
WBC # FLD AUTO: 7.76 K/UL

## 2022-06-10 NOTE — ASSESSMENT
[FreeTextEntry1] : asthma- patient reports much better  control since started on Pulmicort and Montelukast last year but ran out of meds \par resume Pulmicort and Montelukast\par  continue ventolin as needed \par \par GERD- meds \par  rec to take daily OMeprazole 20 mg \par diet and weight loss discussed \par GERD control with diet, weight loss and life style changes discussed \par \par  Foods and drinks that have been commonly linked to GERD symptoms include;\par \par •acidic foods, such as citrus fruits and tomatoes\par •alcoholic drinks\par •chocolate\par •coffee and other sources of caffeine\par •high-fat foods\par •mint\par •spicy foods\par Carbonated beverages \par Lifestyle Changes\par \par Avoid lying down for at least two hours after a meal or after drinking acidic beverages, like soda, or other caffeinated beverages. This can help to prevent stomach contents from flowing back into the esophagus. \par Keep your head elevated while you sleep. Using an extra pillow or two can also help to prevent reflux. \par Eat smaller and more frequent meals each day instead of a few large meals. This promotes digestion and can aid in preventing heartburn. \par Wear loose-fitting clothes to ease pressure on the stomach, which can worsen heartburn and reflux.\par \par Reduce excess weight around the midsection. This can ease pressure on the stomach. Such pressure can force some stomach contents back up the esophagus\par \par R forearm lesion - refer to Derm

## 2022-06-10 NOTE — DISCUSSION/SUMMARY
[Patient seen for WTC Monitoring ___] : Patient was seen for WTC monitoring [unfilled] [Please See Note in Chart and Documentation in Trial DB] : Please see note in chart and documentation in Trial DB. 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 Ti Penny =\par Cards: Dr. Leeroy Avendano\par PMH/PSH: CAD s/p stent; EGD 2021, GERD, Asthma \par Family History: no family hx of colon cancer \par \par Allergies: NKDA\par Meds: see trial db and allscripts \par Soc Hx: 2 kids in good health \par Smoking Status: never \par Review of Systems—IAMQ reviewed with patient\par \par \par \par Results:\par \par \par \par Spirometry: this encounter was conducted over  on the telephone during time of COVID 19 pandemic\par \par PE: in trial DB \par A/P: WTC annual MV\par see Occ Med note for details \par \par \par

## 2022-06-10 NOTE — PHYSICAL EXAM
[General Appearance - Alert] : alert [General Appearance - In No Acute Distress] : in no acute distress [Outer Ear] : the ears and nose were normal in appearance [Oropharynx] : the oropharynx was normal [Auscultation Breath Sounds / Voice Sounds] : lungs were clear to auscultation bilaterally [Heart Rate And Rhythm] : heart rate was normal and rhythm regular [Heart Sounds] : normal S1 and S2 [Heart Sounds Gallop] : no gallops [Murmurs] : no murmurs [Heart Sounds Pericardial Friction Rub] : no pericardial rub [Bowel Sounds] : normal bowel sounds [Abdomen Soft] : soft [Abdomen Tenderness] : non-tender [] : no hepato-splenomegaly [Abdomen Mass (___ Cm)] : no abdominal mass palpated [FreeTextEntry1] : grey papule R lateral forearm  , dry and excoriated

## 2022-06-10 NOTE — HISTORY OF PRESENT ILLNESS
[Wheelchair] : uses a wheelchair [Normal RUE] : Right Upper Extremity: No scars, rashes, lesions, ulcers, skin intact [Normal LUE] : Left Upper Extremity: No scars, rashes, lesions, ulcers, skin intact [Normal Touch] : sensation intact for touch [FreeTextEntry1] : asthma\par \par  started on Pulmicort 90 MCG/ACT BID was using it but ran out of meds \par  and Montelukast \par  - using  Ventolin as needed daily \par \par \par \par GERD \par much improved \par \par  has Sx daily when he \par  eats large  dinner late \par \par  no diarrhea, \par  no n/v\par  no dysphagia \par \par \par colonoscopy 04 2015 - had TA \par colonoscopy 10 2021- had 3 TAs \par 2021 EGD - NL \par  never had ER/UC visits for asthma \par  never treated with PO steroids for asthma  [Normal] : No swelling, no edema, normal pedal pulses and normal temperature [Obese] : obese [Poor Appearance] : well-appearing [Acute Distress] : not in acute distress [de-identified] : Right Upper Extremity\par o Shoulder :\par ¦ Inspection/Palpation : tenderness and swelling over the acromion, no deformity \par ¦ Range of Motion : ACTIVE FORWARD ELEVATION: Measured at 70 degrees, ACTIVE EXTERNAL ROTATION: Measured at 30 degrees, ACTIVE INTERNAL ROTATION: Measured at L5 \par ¦ Strength : external rotation 3/5, internal rotation 5/5\par ¦ Stability : no joint instability on provocative testing\par o Upper Arm : no tenderness, no swelling, no deformities\par o Muscle Bulk : no atrophy \par o Sensation : sensation intact to light touch \par o Skin : no skin rash, no discoloration \par o Vascular Exam : no edema, no cyanosis, radial and ulnar pulses normal  [de-identified] : o  Right Shoulder : Internal/External rotation, and outlet views were obtained, there are no soft tissue abnormalities, fracture of the right acromion with a 7.0 mm displacement, there is evidence of early callus formation, degenerative change of the greater tuberosity, severe glenohumeral osteoarthritis with proximal migration of the humeral head.

## 2022-06-10 NOTE — HISTORY OF PRESENT ILLNESS
[FreeTextEntry1] : asthma\par \par  started on Pulmicort 90 MCG/ACT BID was using it but ran out of meds \par  and Montelukast \par  - using  Ventolin as needed daily \par \par \par \par GERD \par much improved \par \par  has Sx daily when he \par  eats large  dinner late \par \par  no diarrhea, \par  no n/v\par  no dysphagia \par \par \par colonoscopy 04 2015 - had TA \par colonoscopy 10 2021- had 3 TAs \par 2021 EGD - NL \par  never had ER/UC visits for asthma \par  never treated with PO steroids for asthma

## 2022-06-10 NOTE — HEALTH RISK ASSESSMENT
[Patient reported colonoscopy was abnormal] : Patient reported colonoscopy was abnormal [ColonoscopyDate] : 2021 [ColonoscopyComments] : 3 tubular adenomas

## 2022-06-27 ENCOUNTER — FORM ENCOUNTER (OUTPATIENT)
Age: 67
End: 2022-06-27

## 2022-07-10 ENCOUNTER — EMERGENCY (EMERGENCY)
Facility: HOSPITAL | Age: 67
LOS: 1 days | Discharge: HOME | End: 2022-07-10
Attending: EMERGENCY MEDICINE
Payer: MEDICARE

## 2022-07-10 VITALS
RESPIRATION RATE: 18 BRPM | TEMPERATURE: 98 F | HEART RATE: 55 BPM | SYSTOLIC BLOOD PRESSURE: 166 MMHG | OXYGEN SATURATION: 98 % | DIASTOLIC BLOOD PRESSURE: 85 MMHG

## 2022-07-10 VITALS
HEART RATE: 73 BPM | TEMPERATURE: 98 F | SYSTOLIC BLOOD PRESSURE: 120 MMHG | WEIGHT: 169.98 LBS | DIASTOLIC BLOOD PRESSURE: 81 MMHG | RESPIRATION RATE: 16 BRPM | HEIGHT: 70 IN | OXYGEN SATURATION: 98 %

## 2022-07-10 DIAGNOSIS — N20.0 CALCULUS OF KIDNEY: Chronic | ICD-10-CM

## 2022-07-10 LAB
ALBUMIN SERPL ELPH-MCNC: 4.4 G/DL — SIGNIFICANT CHANGE UP (ref 3.3–5)
ALP SERPL-CCNC: 85 U/L — SIGNIFICANT CHANGE UP (ref 40–120)
ALT FLD-CCNC: 22 U/L — SIGNIFICANT CHANGE UP (ref 10–45)
ANION GAP SERPL CALC-SCNC: 12 MMOL/L — SIGNIFICANT CHANGE UP (ref 5–17)
AST SERPL-CCNC: 22 U/L — SIGNIFICANT CHANGE UP (ref 10–40)
BASOPHILS # BLD AUTO: 0.03 K/UL — SIGNIFICANT CHANGE UP (ref 0–0.2)
BASOPHILS NFR BLD AUTO: 0.4 % — SIGNIFICANT CHANGE UP (ref 0–2)
BILIRUB SERPL-MCNC: 0.7 MG/DL — SIGNIFICANT CHANGE UP (ref 0.2–1.2)
BUN SERPL-MCNC: 15 MG/DL — SIGNIFICANT CHANGE UP (ref 7–23)
CALCIUM SERPL-MCNC: 9.5 MG/DL — SIGNIFICANT CHANGE UP (ref 8.4–10.5)
CHLORIDE SERPL-SCNC: 104 MMOL/L — SIGNIFICANT CHANGE UP (ref 96–108)
CO2 SERPL-SCNC: 25 MMOL/L — SIGNIFICANT CHANGE UP (ref 22–31)
CREAT SERPL-MCNC: 0.98 MG/DL — SIGNIFICANT CHANGE UP (ref 0.5–1.3)
EGFR: 85 ML/MIN/1.73M2 — SIGNIFICANT CHANGE UP
EOSINOPHIL # BLD AUTO: 0.05 K/UL — SIGNIFICANT CHANGE UP (ref 0–0.5)
EOSINOPHIL NFR BLD AUTO: 0.6 % — SIGNIFICANT CHANGE UP (ref 0–6)
GLUCOSE SERPL-MCNC: 101 MG/DL — HIGH (ref 70–99)
HCT VFR BLD CALC: 47.3 % — SIGNIFICANT CHANGE UP (ref 39–50)
HGB BLD-MCNC: 15.7 G/DL — SIGNIFICANT CHANGE UP (ref 13–17)
IMM GRANULOCYTES NFR BLD AUTO: 0.2 % — SIGNIFICANT CHANGE UP (ref 0–1.5)
LIDOCAIN IGE QN: 31 U/L — SIGNIFICANT CHANGE UP (ref 7–60)
LYMPHOCYTES # BLD AUTO: 2.19 K/UL — SIGNIFICANT CHANGE UP (ref 1–3.3)
LYMPHOCYTES # BLD AUTO: 26 % — SIGNIFICANT CHANGE UP (ref 13–44)
MCHC RBC-ENTMCNC: 31.5 PG — SIGNIFICANT CHANGE UP (ref 27–34)
MCHC RBC-ENTMCNC: 33.2 GM/DL — SIGNIFICANT CHANGE UP (ref 32–36)
MCV RBC AUTO: 95 FL — SIGNIFICANT CHANGE UP (ref 80–100)
MONOCYTES # BLD AUTO: 0.76 K/UL — SIGNIFICANT CHANGE UP (ref 0–0.9)
MONOCYTES NFR BLD AUTO: 9 % — SIGNIFICANT CHANGE UP (ref 2–14)
NEUTROPHILS # BLD AUTO: 5.36 K/UL — SIGNIFICANT CHANGE UP (ref 1.8–7.4)
NEUTROPHILS NFR BLD AUTO: 63.8 % — SIGNIFICANT CHANGE UP (ref 43–77)
NRBC # BLD: 0 /100 WBCS — SIGNIFICANT CHANGE UP (ref 0–0)
PLATELET # BLD AUTO: 257 K/UL — SIGNIFICANT CHANGE UP (ref 150–400)
POTASSIUM SERPL-MCNC: 4.6 MMOL/L — SIGNIFICANT CHANGE UP (ref 3.5–5.3)
POTASSIUM SERPL-SCNC: 4.6 MMOL/L — SIGNIFICANT CHANGE UP (ref 3.5–5.3)
PROT SERPL-MCNC: 7 G/DL — SIGNIFICANT CHANGE UP (ref 6–8.3)
RBC # BLD: 4.98 M/UL — SIGNIFICANT CHANGE UP (ref 4.2–5.8)
RBC # FLD: 13.2 % — SIGNIFICANT CHANGE UP (ref 10.3–14.5)
SODIUM SERPL-SCNC: 141 MMOL/L — SIGNIFICANT CHANGE UP (ref 135–145)
WBC # BLD: 8.41 K/UL — SIGNIFICANT CHANGE UP (ref 3.8–10.5)
WBC # FLD AUTO: 8.41 K/UL — SIGNIFICANT CHANGE UP (ref 3.8–10.5)

## 2022-07-10 PROCEDURE — 85025 COMPLETE CBC W/AUTO DIFF WBC: CPT

## 2022-07-10 PROCEDURE — 99284 EMERGENCY DEPT VISIT MOD MDM: CPT | Mod: 25

## 2022-07-10 PROCEDURE — 96374 THER/PROPH/DIAG INJ IV PUSH: CPT

## 2022-07-10 PROCEDURE — 80053 COMPREHEN METABOLIC PANEL: CPT

## 2022-07-10 PROCEDURE — 83690 ASSAY OF LIPASE: CPT

## 2022-07-10 PROCEDURE — 36415 COLL VENOUS BLD VENIPUNCTURE: CPT

## 2022-07-10 PROCEDURE — 99284 EMERGENCY DEPT VISIT MOD MDM: CPT

## 2022-07-10 RX ORDER — FAMOTIDINE 10 MG/ML
20 INJECTION INTRAVENOUS ONCE
Refills: 0 | Status: COMPLETED | OUTPATIENT
Start: 2022-07-10 | End: 2022-07-10

## 2022-07-10 RX ORDER — SODIUM CHLORIDE 9 MG/ML
1000 INJECTION INTRAMUSCULAR; INTRAVENOUS; SUBCUTANEOUS ONCE
Refills: 0 | Status: COMPLETED | OUTPATIENT
Start: 2022-07-10 | End: 2022-07-10

## 2022-07-10 RX ADMIN — Medication 30 MILLILITER(S): at 21:46

## 2022-07-10 RX ADMIN — FAMOTIDINE 20 MILLIGRAM(S): 10 INJECTION INTRAVENOUS at 21:46

## 2022-07-10 RX ADMIN — SODIUM CHLORIDE 1000 MILLILITER(S): 9 INJECTION INTRAMUSCULAR; INTRAVENOUS; SUBCUTANEOUS at 21:46

## 2022-07-10 NOTE — ED PROVIDER NOTE - PROGRESS NOTE DETAILS
Anjum, PGY2 - Patient stable for discharge. Understands the Emergency Room work-up and discharge precautions. Will follow-up with gi

## 2022-07-10 NOTE — ED PROVIDER NOTE - CLINICAL SUMMARY MEDICAL DECISION MAKING FREE TEXT BOX
Bright Varela (MD): 66y M presenting w/ intermittent epigastric/LUQ tenderness after eating for 3 months. No pain at this time or any other symptoms. Recommended GI followup to patient. In light of symptoms and co morbidities, will check labs but abd exam benign at this time.

## 2022-07-10 NOTE — ED ADULT NURSE NOTE - OBJECTIVE STATEMENT
66 yr old male A&ox4, presenting to the ED ambulatory complaining of LUQ pain for 3 months. Pt describes the pain as 10/10 burning and radiating to the back, relieved by rest. Pt states he's been feeling nauseous/lightheaded/dizzy x 3 months after eating. Endorses unintentional 15 lb weight loss since onset of Sx's. Denies chest pain/SOB/fever/chills/diarrhea/vomiting. Pt answering questions appropriately, breathing spontaneous and unlabored, abd is soft/nondistended and nontender on palpation, skin color normal for race, cap refill <2. Pt states he was CV+ 2 months ago. Pt states he takes Omeprazole PRN. Safety and comfort measures provided, bed locked and in lowest position, side rails up for safety. Call bell within reach. Awaiting results.

## 2022-07-10 NOTE — ED PROVIDER NOTE - OBJECTIVE STATEMENT
66y M w/ PMHx of HLD, HTN, BPH, GERD presents to the ED c/o intermitent LUQ pain a/w nausea and lightheadedness [id number]. Describes pain as burning w/ radiation to back. Pt states that symptoms come shortly after eating. Pt had an endoscopy and colonoscopy before symptoms started. Pt recently had covid a few days ago. Pt not fully compliant w/ Omeprazole, states he takes it when he feels burning sensation without improvement of pain. Denies ETOH use. Denies vomiting, diarrhea, fever.

## 2022-07-10 NOTE — ED PROVIDER NOTE - NSFOLLOWUPINSTRUCTIONS_ED_ALL_ED_FT
You were seen in the Emergency Room today because of abdominal pain. Your lab and imaging results are included in your discharge paperwork.     Please follow-up with your Primary Care Physician within the week.   We also recommend you following up with a Gastroenterologist for further management and workup. Call [phone] and ask for an appointment at a convenient location.     TAKE YOUR OMEPRAZOLE AS DIRECTED, EVERY DAY. This will help with your pain.     WHAT YOU NEED TO KNOW   Gastritis is inflammation or irritation of the lining of your stomach.     DISCHARGE INSTRUCTIONS:  Seek care immediately if:   •You develop chest pain or shortness of breath.  •You vomit blood.  •You have black or bloody bowel movements.  •You have severe stomach or back pain.    Contact your healthcare provider if:   •You have a fever.  •You have new or worsening symptoms, even after treatment.  •You have questions or concerns about your condition or care.      Manage or prevent gastritis:   •Do not smoke. Nicotine and other chemicals in cigarettes and cigars can make your symptoms worse and cause lung damage. Ask your healthcare provider for information if you currently smoke and need help to quit. E-cigarettes or smokeless tobacco still contain nicotine. Talk to your healthcare provider before you use these products.   •Do not drink alcohol. Alcohol can prevent healing and make your gastritis worse. Talk to your healthcare provider if you need help to stop drinking.  •Do not take NSAIDs or aspirin unless directed. These and similar medicines can cause irritation. If your healthcare provider says it is okay to take NSAIDs, take them with food.   •Do not eat foods that cause irritation. Foods such as oranges and salsa can cause burning or pain. Eat a variety of healthy foods. Examples include fruits (not citrus), vegetables, low-fat dairy products, beans, whole-grain breads, and lean meats and fish. Try to eat small meals, and drink water with your meals. Do not eat for at least 3 hours before you go to bed.  •Find ways to relax and decrease stress. Stress can increase stomach acid and make gastritis worse. Activities such as yoga, meditation, or listening to music can help you relax. Spend time with friends, or do things you enjoy.    Follow up with your healthcare provider as directed: You may need ongoing tests or treatment, or referral to a gastroenterologist. Write down your questions so you remember to ask them during your visits.

## 2022-07-10 NOTE — ED PROVIDER NOTE - PATIENT PORTAL LINK FT
You can access the FollowMyHealth Patient Portal offered by [facility] by registering at the following website: [email or web address]. By joining [facility]’s FollowMyHealth portal, you will also be able to view your health information using other applications (apps) compatible with our system.

## 2022-09-14 ENCOUNTER — APPOINTMENT (OUTPATIENT)
Dept: GASTROENTEROLOGY | Facility: CLINIC | Age: 67
End: 2022-09-14

## 2022-09-14 VITALS
DIASTOLIC BLOOD PRESSURE: 78 MMHG | SYSTOLIC BLOOD PRESSURE: 122 MMHG | TEMPERATURE: 97.7 F | BODY MASS INDEX: 25.2 KG/M2 | WEIGHT: 176 LBS | OXYGEN SATURATION: 97 % | HEIGHT: 70 IN | HEART RATE: 72 BPM

## 2022-09-14 PROCEDURE — 99214 OFFICE O/P EST MOD 30 MIN: CPT

## 2022-09-14 NOTE — ASSESSMENT
[FreeTextEntry1] : Impression:\par 1. Abdominal pain with abnormal weight loss - no findings on EGD/colonoscopy just last year; differential includes pancreatic lesions, gastroparesis, vs nephrolithiasis, renal lesions\par 2. GERD\par \par Plan;\par -Advised lifestyle modifications for reflux. Asked patient to avoid eating 3 hours before going to bed or laying down, and to elevate the head of his bed.\par -Will obtain CT A/P given recent negative endoscopic evaluation and pain with radiation to back and associated unintended weight loss\par -Can continue PPI for now\par -If no findings on CT and pain continues, will consider repeat EGD with pH testing off  PPi and motility testing for gastroparesis\par -Discussed with patient his previous colonoscopy with villous adenoma, needs repeat colonoscopy in 2024\par \par RTC after CT A/P

## 2022-09-14 NOTE — HISTORY OF PRESENT ILLNESS
[de-identified] : EGD 10/18/21:\par Findings:\par      The examined esophagus was normal. Estimated blood loss: none.\par      The entire examined stomach was normal. Estimated blood loss: none.\par      The examined duodenum was normal. Estimated blood loss: none.\par                                                                                                    \par Impression:          - Normal esophagus consistent with non erosive reflux.\par                      - Normal stomach.\par                      - Normal examined duodenum.\par                      - No specimens collected.\par Recommendation:      - Discharge patient to home (ambulatory).\par                      - Continue present medications. [FreeTextEntry1] : Colon 10/18/21:\par  The total BBPS score equals 9.\par                                                                                                    \par Findings:\par      The perianal and digital rectal examinations were normal. Pertinent negatives include normal\par      sphincter tone.\par      Two sessile polyps were found in the ascending colon and cecum. The polyps were 3 to 10 mm in\par      size. These polyps were removed with a cold snare. Resection and retrieval were complete.\par      Estimated blood loss was minimal.\par                                                                                                    \par Impression:          - Two 3 to 10 mm polyps in the ascending colon and in the cecum, removed with\par                      a cold snare. Resected and retrieved.\par Recommendation:      - Discharge patient to home (ambulatory).\par                      - Repeat colonoscopy in 3 - 5 years for surveillance based on pathology\par                      results.\par \par Path:\par Final Diagnosis\par 1. Colon, cecum, polyp, biopsy:\par - Tubular adenoma.\par \par \par 2. Colon, ascending colon, polyp, biopsy:\par - Tubular adenoma with villous features (fragments).

## 2022-09-14 NOTE — PHYSICAL EXAM
[Alert] : alert [Normal Voice/Communication] : normal voice/communication [Sclera] : the sclera and conjunctiva were normal [Hearing Threshold Finger Rub Not [place]] : hearing was normal [Normal Appearance] : the appearance of the neck was normal [No Respiratory Distress] : no respiratory distress [No Acc Muscle Use] : no accessory muscle use [Respiration, Rhythm And Depth] : normal respiratory rhythm and effort [Heart Rate And Rhythm] : heart rate was normal and rhythm regular [Normal S1, S2] : normal S1 and S2 [Murmurs] : no murmurs [Bowel Sounds] : normal bowel sounds [Abdomen Tenderness] : non-tender [No Masses] : no abdominal mass palpated [Abdomen Soft] : soft [No Spinal Tenderness] : no spinal tenderness [Abnormal Walk] : normal gait [Normal Color / Pigmentation] : normal skin color and pigmentation [No Focal Deficits] : no focal deficits [Oriented To Time, Place, And Person] : oriented to person, place, and time

## 2022-09-28 ENCOUNTER — APPOINTMENT (OUTPATIENT)
Dept: DERMATOLOGY | Facility: CLINIC | Age: 67
End: 2022-09-28
Payer: COMMERCIAL

## 2022-09-28 ENCOUNTER — FORM ENCOUNTER (OUTPATIENT)
Age: 67
End: 2022-09-28

## 2022-09-28 ENCOUNTER — NON-APPOINTMENT (OUTPATIENT)
Age: 67
End: 2022-09-28

## 2022-09-28 PROCEDURE — 99204 OFFICE O/P NEW MOD 45 MIN: CPT

## 2022-09-28 NOTE — HISTORY OF PRESENT ILLNESS
[FreeTextEntry1] : rough spots on arms [de-identified] : rough spots on arms over the years\par itchy at times\par martinez with sun

## 2022-10-13 RX ORDER — ALBUTEROL SULFATE 90 UG/1
108 (90 BASE) AEROSOL, METERED RESPIRATORY (INHALATION)
Qty: 3 | Refills: 3 | Status: ACTIVE | COMMUNITY
Start: 2020-04-03 | End: 1900-01-01

## 2022-10-26 ENCOUNTER — APPOINTMENT (OUTPATIENT)
Dept: CT IMAGING | Facility: IMAGING CENTER | Age: 67
End: 2022-10-26

## 2022-10-26 ENCOUNTER — OUTPATIENT (OUTPATIENT)
Dept: OUTPATIENT SERVICES | Facility: HOSPITAL | Age: 67
LOS: 1 days | End: 2022-10-26
Payer: MEDICARE

## 2022-10-26 DIAGNOSIS — R63.4 ABNORMAL WEIGHT LOSS: ICD-10-CM

## 2022-10-26 DIAGNOSIS — R10.9 UNSPECIFIED ABDOMINAL PAIN: ICD-10-CM

## 2022-10-26 DIAGNOSIS — N20.0 CALCULUS OF KIDNEY: Chronic | ICD-10-CM

## 2022-10-26 PROCEDURE — 74177 CT ABD & PELVIS W/CONTRAST: CPT

## 2022-10-26 PROCEDURE — 74177 CT ABD & PELVIS W/CONTRAST: CPT | Mod: 26,MH

## 2022-10-28 ENCOUNTER — NON-APPOINTMENT (OUTPATIENT)
Age: 67
End: 2022-10-28

## 2022-10-28 ENCOUNTER — APPOINTMENT (OUTPATIENT)
Dept: OTHER | Facility: CLINIC | Age: 67
End: 2022-10-28

## 2022-11-01 ENCOUNTER — NON-APPOINTMENT (OUTPATIENT)
Age: 67
End: 2022-11-01

## 2022-11-29 ENCOUNTER — APPOINTMENT (OUTPATIENT)
Dept: MRI IMAGING | Facility: IMAGING CENTER | Age: 67
End: 2022-11-29

## 2022-11-29 ENCOUNTER — OUTPATIENT (OUTPATIENT)
Dept: OUTPATIENT SERVICES | Facility: HOSPITAL | Age: 67
LOS: 1 days | End: 2022-11-29
Payer: MEDICARE

## 2022-11-29 DIAGNOSIS — R63.4 ABNORMAL WEIGHT LOSS: ICD-10-CM

## 2022-11-29 PROCEDURE — 74183 MRI ABD W/O CNTR FLWD CNTR: CPT | Mod: 26,MH

## 2022-11-29 PROCEDURE — 74183 MRI ABD W/O CNTR FLWD CNTR: CPT

## 2022-11-29 PROCEDURE — A9585: CPT

## 2022-12-14 ENCOUNTER — APPOINTMENT (OUTPATIENT)
Dept: UROLOGY | Facility: CLINIC | Age: 67
End: 2022-12-14

## 2022-12-14 VITALS — HEART RATE: 75 BPM | DIASTOLIC BLOOD PRESSURE: 67 MMHG | SYSTOLIC BLOOD PRESSURE: 115 MMHG | OXYGEN SATURATION: 96 %

## 2022-12-14 DIAGNOSIS — R97.20 ELEVATED PROSTATE, SPECIFIC ANTIGEN [PSA]: ICD-10-CM

## 2022-12-14 PROCEDURE — 99203 OFFICE O/P NEW LOW 30 MIN: CPT

## 2022-12-14 PROCEDURE — 51798 US URINE CAPACITY MEASURE: CPT

## 2022-12-14 NOTE — REVIEW OF SYSTEMS
[Heartburn] : heartburn [Urine Infection (bladder/kidney)] : bladder/kidney infection [Told you have blood in urine on a urine test] : told blood was present in a urine test [History of kidney stones] : history of kidney stones [Urine retention] : urine retention [Wake up at night to urinate  How many times?  ___] : wakes up to urinate [unfilled] times during the night [Strong urge to urinate] : strong urge to urinate [Bladder pressure] : experiences bladder pressure [Strain or push to urinate] : strain or push to urinate [Slow urine stream] : slow urine stream [Interrupted urine stream] : interrupted urine stream [Bladder fullness after urinating] : bladder fullness after urinating [Leakage of urine with urgency] : leakage of urine with urgency [Leakage of urine with straining, coughing, laughing] : leakage of urine with straining, coughing, laughing [Negative] : Heme/Lymph

## 2022-12-14 NOTE — HISTORY OF PRESENT ILLNESS
[FreeTextEntry1] : referred for evaluation of an elevated PSA and Lts.\par notes long h/o later; as high a 11 and had a negative biopsy @6 years ago. \par Also had MRI within 2 years - thinks it was negative but doesn't know detils.\par PSA now 8.2 27% \par \par also has LUTs. Has been on Tamsulosin for years and finasteride for 6 months. \par He notes frequency with urgency and can have leakage; uses a bottle in car. Has nocturia 4-5 times. The FOS is slower and can push to start and has intermittency. Remote h/o retention. \par No hematuria or UTI symptoms.\par PVR 85\par

## 2022-12-14 NOTE — ASSESSMENT
[FreeTextEntry1] : needs to get records from prior urologist especially the MRI\par discussed his LUTs which are bothersome in terms of medical versus procedures.\par he is interested in procedure - need size from MRI but did review TURP/Button TURP  i nterns of performance, outcomes and side effects.\par will get Uroflow and need other records

## 2023-03-17 ENCOUNTER — APPOINTMENT (OUTPATIENT)
Dept: UROLOGY | Facility: CLINIC | Age: 68
End: 2023-03-17
Payer: MEDICARE

## 2023-03-17 VITALS
DIASTOLIC BLOOD PRESSURE: 75 MMHG | BODY MASS INDEX: 24.77 KG/M2 | OXYGEN SATURATION: 96 % | HEART RATE: 83 BPM | HEIGHT: 70 IN | WEIGHT: 173.05 LBS | TEMPERATURE: 98 F | SYSTOLIC BLOOD PRESSURE: 144 MMHG

## 2023-03-17 PROCEDURE — 99213 OFFICE O/P EST LOW 20 MIN: CPT

## 2023-03-18 NOTE — ASSESSMENT
[FreeTextEntry1] : interested in an outlet procedure.\par reviewed MRI findings - at 126 candidate for TUR. reviewed how performed, need for Glaser, usually outpatient, outcome and complications.\par will proceed with scheduling.

## 2023-03-18 NOTE — HISTORY OF PRESENT ILLNESS
[FreeTextEntry1] : referred for evaluation of an elevated PSA and Lts.\par notes long h/o later; as high a 11 and had a negative biopsy @6 years ago. \par Also had MRI within 2 years - thinks it was negative but doesn't know details.\par PSA now 8.2 27% \par \par also has LUTs. Has been on Tamsulosin for years and finasteride for 6 months. \par He notes frequency with urgency and can have leakage; uses a bottle in car. Has nocturia 4-5 times. The FOS is slower and can push to start and has intermittency. Remote h/o retention. \par No hematuria or UTI symptoms.\par PVR 85\par \par 3/23 - had him double up the tamsulosin - not much difference other than improved urgency \par MRI notes 126cc prostate and NO suspicious lesions.

## 2023-04-11 ENCOUNTER — OUTPATIENT (OUTPATIENT)
Dept: OUTPATIENT SERVICES | Facility: HOSPITAL | Age: 68
LOS: 1 days | End: 2023-04-11
Payer: MEDICARE

## 2023-04-11 VITALS
RESPIRATION RATE: 16 BRPM | DIASTOLIC BLOOD PRESSURE: 85 MMHG | HEART RATE: 81 BPM | SYSTOLIC BLOOD PRESSURE: 145 MMHG | HEIGHT: 68.5 IN | TEMPERATURE: 97 F | OXYGEN SATURATION: 97 % | WEIGHT: 177.91 LBS

## 2023-04-11 DIAGNOSIS — G47.33 OBSTRUCTIVE SLEEP APNEA (ADULT) (PEDIATRIC): ICD-10-CM

## 2023-04-11 DIAGNOSIS — N20.0 CALCULUS OF KIDNEY: Chronic | ICD-10-CM

## 2023-04-11 DIAGNOSIS — Z95.5 PRESENCE OF CORONARY ANGIOPLASTY IMPLANT AND GRAFT: ICD-10-CM

## 2023-04-11 DIAGNOSIS — N40.1 BENIGN PROSTATIC HYPERPLASIA WITH LOWER URINARY TRACT SYMPTOMS: ICD-10-CM

## 2023-04-11 DIAGNOSIS — I10 ESSENTIAL (PRIMARY) HYPERTENSION: ICD-10-CM

## 2023-04-11 DIAGNOSIS — K21.9 GASTRO-ESOPHAGEAL REFLUX DISEASE WITHOUT ESOPHAGITIS: ICD-10-CM

## 2023-04-11 LAB
ANION GAP SERPL CALC-SCNC: 14 MMOL/L — SIGNIFICANT CHANGE UP (ref 7–14)
BUN SERPL-MCNC: 19 MG/DL — SIGNIFICANT CHANGE UP (ref 7–23)
CALCIUM SERPL-MCNC: 9.4 MG/DL — SIGNIFICANT CHANGE UP (ref 8.4–10.5)
CHLORIDE SERPL-SCNC: 101 MMOL/L — SIGNIFICANT CHANGE UP (ref 98–107)
CO2 SERPL-SCNC: 25 MMOL/L — SIGNIFICANT CHANGE UP (ref 22–31)
CREAT SERPL-MCNC: 1 MG/DL — SIGNIFICANT CHANGE UP (ref 0.5–1.3)
EGFR: 82 ML/MIN/1.73M2 — SIGNIFICANT CHANGE UP
GLUCOSE SERPL-MCNC: 86 MG/DL — SIGNIFICANT CHANGE UP (ref 70–99)
HCT VFR BLD CALC: 46.9 % — SIGNIFICANT CHANGE UP (ref 39–50)
HGB BLD-MCNC: 15.6 G/DL — SIGNIFICANT CHANGE UP (ref 13–17)
MCHC RBC-ENTMCNC: 31.3 PG — SIGNIFICANT CHANGE UP (ref 27–34)
MCHC RBC-ENTMCNC: 33.3 GM/DL — SIGNIFICANT CHANGE UP (ref 32–36)
MCV RBC AUTO: 94 FL — SIGNIFICANT CHANGE UP (ref 80–100)
NRBC # BLD: 0 /100 WBCS — SIGNIFICANT CHANGE UP (ref 0–0)
NRBC # FLD: 0 K/UL — SIGNIFICANT CHANGE UP (ref 0–0)
PLATELET # BLD AUTO: 298 K/UL — SIGNIFICANT CHANGE UP (ref 150–400)
POTASSIUM SERPL-MCNC: 4 MMOL/L — SIGNIFICANT CHANGE UP (ref 3.5–5.3)
POTASSIUM SERPL-SCNC: 4 MMOL/L — SIGNIFICANT CHANGE UP (ref 3.5–5.3)
RBC # BLD: 4.99 M/UL — SIGNIFICANT CHANGE UP (ref 4.2–5.8)
RBC # FLD: 13 % — SIGNIFICANT CHANGE UP (ref 10.3–14.5)
SODIUM SERPL-SCNC: 140 MMOL/L — SIGNIFICANT CHANGE UP (ref 135–145)
WBC # BLD: 8.44 K/UL — SIGNIFICANT CHANGE UP (ref 3.8–10.5)
WBC # FLD AUTO: 8.44 K/UL — SIGNIFICANT CHANGE UP (ref 3.8–10.5)

## 2023-04-11 PROCEDURE — 93010 ELECTROCARDIOGRAM REPORT: CPT

## 2023-04-11 RX ORDER — ASPIRIN/CALCIUM CARB/MAGNESIUM 324 MG
0 TABLET ORAL
Qty: 0 | Refills: 0 | DISCHARGE

## 2023-04-11 RX ORDER — NIFEDIPINE 30 MG
1 TABLET, EXTENDED RELEASE 24 HR ORAL
Refills: 0 | DISCHARGE

## 2023-04-11 RX ORDER — FLUNISOLIDE 80 UG/1
0 AEROSOL, METERED RESPIRATORY (INHALATION)
Qty: 0 | Refills: 0 | DISCHARGE

## 2023-04-11 RX ORDER — ALBUTEROL 90 UG/1
2 AEROSOL, METERED ORAL
Qty: 0 | Refills: 0 | DISCHARGE

## 2023-04-11 RX ORDER — NIFEDIPINE 30 MG
1 TABLET, EXTENDED RELEASE 24 HR ORAL
Qty: 0 | Refills: 0 | DISCHARGE

## 2023-04-11 RX ORDER — ATORVASTATIN CALCIUM 80 MG/1
1 TABLET, FILM COATED ORAL
Qty: 0 | Refills: 0 | DISCHARGE

## 2023-04-11 RX ORDER — MONTELUKAST 4 MG/1
1 TABLET, CHEWABLE ORAL
Qty: 0 | Refills: 0 | DISCHARGE

## 2023-04-11 RX ORDER — OMEPRAZOLE 10 MG/1
1 CAPSULE, DELAYED RELEASE ORAL
Qty: 0 | Refills: 0 | DISCHARGE

## 2023-04-11 RX ORDER — TAMSULOSIN HYDROCHLORIDE 0.4 MG/1
1 CAPSULE ORAL
Qty: 0 | Refills: 0 | DISCHARGE

## 2023-04-11 RX ORDER — METOPROLOL TARTRATE 50 MG
1 TABLET ORAL
Qty: 0 | Refills: 0 | DISCHARGE

## 2023-04-11 RX ORDER — LOSARTAN POTASSIUM 100 MG/1
1 TABLET, FILM COATED ORAL
Qty: 0 | Refills: 0 | DISCHARGE

## 2023-04-11 NOTE — H&P PST ADULT - PROBLEM SELECTOR PLAN 3
times 1, pt takes aspirine 325 mg po daily , cardiac clearance with management of aspirin from Cardiology DR Mai [phone]. EKG comparison requested. times 1, pt takes aspirine 325 mg po daily , cardiac clearance with management of aspirin from Cardiology DR Mai [phone]. EKG comparison requested, EKG faxed to Dr Mai for comparison .

## 2023-04-11 NOTE — H&P PST ADULT - FUNCTIONAL STATUS
walks 30 min daily , stairs 2-3 flights daily ,gardens , carries groceries, ADL's ; denies any cardiac symptoms/4-10 METS

## 2023-04-11 NOTE — H&P PST ADULT - PROBLEM SELECTOR PLAN 2
pt instructed to take losartan , metoprolol , nifedipine day of surgery . Monitor BP during hospital stay . pt instructed to take losartan , metoprolol , nifedipine day of surgery . Monitor BP during hospital stay .EKG sent to Dr Mai faxed for review, Dr Mai to follow up  with pt , management of aspirin for OR . Cardiac clearance to be faxed .

## 2023-04-11 NOTE — H&P PST ADULT - PROBLEM SELECTOR PLAN 1
Scheduled for transurethral resection of the prostate   Preop instructions provided and patient verbalizes understanding.  Labs done and results pending.

## 2023-04-11 NOTE — H&P PST ADULT - HISTORY OF PRESENT ILLNESS
This is a 67 y.o. male with enlarged prostate with lower urinary tract symptoms . Pt evaluated by Dr Moon. Pt is scheduled for OR 4/20/23 .

## 2023-04-18 ENCOUNTER — NON-APPOINTMENT (OUTPATIENT)
Age: 68
End: 2023-04-18

## 2023-04-19 ENCOUNTER — TRANSCRIPTION ENCOUNTER (OUTPATIENT)
Age: 68
End: 2023-04-19

## 2023-04-19 NOTE — ASU PATIENT PROFILE, ADULT - FALL HARM RISK - UNIVERSAL INTERVENTIONS
Bed in lowest position, wheels locked, appropriate side rails in place/Call bell, personal items and telephone in reach/Instruct patient to call for assistance before getting out of bed or chair/Non-slip footwear when patient is out of bed/[place] to call system/Physically safe environment - no spills, clutter or unnecessary equipment/Purposeful Proactive Rounding/Room/bathroom lighting operational, light cord in reach

## 2023-04-20 ENCOUNTER — INPATIENT (INPATIENT)
Facility: HOSPITAL | Age: 68
LOS: 2 days | Discharge: HOME | End: 2023-04-23
Attending: SPECIALIST | Admitting: SPECIALIST
Payer: MEDICARE

## 2023-04-20 ENCOUNTER — RESULT REVIEW (OUTPATIENT)
Age: 68
End: 2023-04-20

## 2023-04-20 ENCOUNTER — APPOINTMENT (OUTPATIENT)
Dept: UROLOGY | Facility: HOSPITAL | Age: 68
End: 2023-04-20

## 2023-04-20 VITALS
RESPIRATION RATE: 16 BRPM | TEMPERATURE: 98 F | SYSTOLIC BLOOD PRESSURE: 139 MMHG | OXYGEN SATURATION: 99 % | DIASTOLIC BLOOD PRESSURE: 83 MMHG | HEIGHT: 68.5 IN | WEIGHT: 177.91 LBS | HEART RATE: 60 BPM

## 2023-04-20 DIAGNOSIS — N40.0 BENIGN PROSTATIC HYPERPLASIA WITHOUT LOWER URINARY TRACT SYMPTOMS: ICD-10-CM

## 2023-04-20 DIAGNOSIS — N20.0 CALCULUS OF KIDNEY: Chronic | ICD-10-CM

## 2023-04-20 DIAGNOSIS — N40.1 BENIGN PROSTATIC HYPERPLASIA WITH LOWER URINARY TRACT SYMPTOMS: ICD-10-CM

## 2023-04-20 LAB
ALBUMIN SERPL ELPH-MCNC: 2.3 G/DL — LOW (ref 3.3–5)
ALP SERPL-CCNC: 45 U/L — SIGNIFICANT CHANGE UP (ref 40–120)
ALT FLD-CCNC: 10 U/L — SIGNIFICANT CHANGE UP (ref 4–41)
ANION GAP SERPL CALC-SCNC: 14 MMOL/L — SIGNIFICANT CHANGE UP (ref 7–14)
APTT BLD: 22.4 SEC — LOW (ref 27–36.3)
AST SERPL-CCNC: 14 U/L — SIGNIFICANT CHANGE UP (ref 4–40)
BILIRUB SERPL-MCNC: 0.8 MG/DL — SIGNIFICANT CHANGE UP (ref 0.2–1.2)
BLD GP AB SCN SERPL QL: NEGATIVE — SIGNIFICANT CHANGE UP
BUN SERPL-MCNC: 18 MG/DL — SIGNIFICANT CHANGE UP (ref 7–23)
CALCIUM SERPL-MCNC: 7.5 MG/DL — LOW (ref 8.4–10.5)
CHLORIDE SERPL-SCNC: 102 MMOL/L — SIGNIFICANT CHANGE UP (ref 98–107)
CO2 SERPL-SCNC: 17 MMOL/L — LOW (ref 22–31)
CREAT SERPL-MCNC: 0.77 MG/DL — SIGNIFICANT CHANGE UP (ref 0.5–1.3)
EGFR: 98 ML/MIN/1.73M2 — SIGNIFICANT CHANGE UP
GLUCOSE SERPL-MCNC: 163 MG/DL — HIGH (ref 70–99)
HCT VFR BLD CALC: 30.7 % — LOW (ref 39–50)
HCT VFR BLD CALC: 34 % — LOW (ref 39–50)
HCT VFR BLD CALC: 41.9 % — SIGNIFICANT CHANGE UP (ref 39–50)
HGB BLD-MCNC: 10.3 G/DL — LOW (ref 13–17)
HGB BLD-MCNC: 11.3 G/DL — LOW (ref 13–17)
HGB BLD-MCNC: 14 G/DL — SIGNIFICANT CHANGE UP (ref 13–17)
INR BLD: 1.43 RATIO — HIGH (ref 0.88–1.16)
MCHC RBC-ENTMCNC: 30.8 PG — SIGNIFICANT CHANGE UP (ref 27–34)
MCHC RBC-ENTMCNC: 31 PG — SIGNIFICANT CHANGE UP (ref 27–34)
MCHC RBC-ENTMCNC: 31.9 PG — SIGNIFICANT CHANGE UP (ref 27–34)
MCHC RBC-ENTMCNC: 33.2 GM/DL — SIGNIFICANT CHANGE UP (ref 32–36)
MCHC RBC-ENTMCNC: 33.4 GM/DL — SIGNIFICANT CHANGE UP (ref 32–36)
MCHC RBC-ENTMCNC: 33.6 GM/DL — SIGNIFICANT CHANGE UP (ref 32–36)
MCV RBC AUTO: 92.3 FL — SIGNIFICANT CHANGE UP (ref 80–100)
MCV RBC AUTO: 93.4 FL — SIGNIFICANT CHANGE UP (ref 80–100)
MCV RBC AUTO: 95 FL — SIGNIFICANT CHANGE UP (ref 80–100)
NRBC # BLD: 0 /100 WBCS — SIGNIFICANT CHANGE UP (ref 0–0)
NRBC # FLD: 0 K/UL — SIGNIFICANT CHANGE UP (ref 0–0)
PLATELET # BLD AUTO: 263 K/UL — SIGNIFICANT CHANGE UP (ref 150–400)
PLATELET # BLD AUTO: 291 K/UL — SIGNIFICANT CHANGE UP (ref 150–400)
PLATELET # BLD AUTO: 304 K/UL — SIGNIFICANT CHANGE UP (ref 150–400)
POTASSIUM SERPL-MCNC: 4 MMOL/L — SIGNIFICANT CHANGE UP (ref 3.5–5.3)
POTASSIUM SERPL-SCNC: 4 MMOL/L — SIGNIFICANT CHANGE UP (ref 3.5–5.3)
PROT SERPL-MCNC: 3.6 G/DL — LOW (ref 6–8.3)
PROTHROM AB SERPL-ACNC: 16.6 SEC — HIGH (ref 10.5–13.4)
RBC # BLD: 3.23 M/UL — LOW (ref 4.2–5.8)
RBC # BLD: 3.64 M/UL — LOW (ref 4.2–5.8)
RBC # BLD: 4.54 M/UL — SIGNIFICANT CHANGE UP (ref 4.2–5.8)
RBC # FLD: 12.6 % — SIGNIFICANT CHANGE UP (ref 10.3–14.5)
RBC # FLD: 12.7 % — SIGNIFICANT CHANGE UP (ref 10.3–14.5)
RBC # FLD: 12.8 % — SIGNIFICANT CHANGE UP (ref 10.3–14.5)
RH IG SCN BLD-IMP: POSITIVE — SIGNIFICANT CHANGE UP
RH IG SCN BLD-IMP: POSITIVE — SIGNIFICANT CHANGE UP
SODIUM SERPL-SCNC: 133 MMOL/L — LOW (ref 135–145)
WBC # BLD: 14.34 K/UL — HIGH (ref 3.8–10.5)
WBC # BLD: 14.74 K/UL — HIGH (ref 3.8–10.5)
WBC # BLD: 16.59 K/UL — HIGH (ref 3.8–10.5)
WBC # FLD AUTO: 14.34 K/UL — HIGH (ref 3.8–10.5)
WBC # FLD AUTO: 14.74 K/UL — HIGH (ref 3.8–10.5)
WBC # FLD AUTO: 16.59 K/UL — HIGH (ref 3.8–10.5)

## 2023-04-20 PROCEDURE — 52601 PROSTATECTOMY (TURP): CPT

## 2023-04-20 PROCEDURE — 88305 TISSUE EXAM BY PATHOLOGIST: CPT | Mod: 26

## 2023-04-20 PROCEDURE — 93010 ELECTROCARDIOGRAM REPORT: CPT

## 2023-04-20 RX ORDER — HEPARIN SODIUM 5000 [USP'U]/ML
5000 INJECTION INTRAVENOUS; SUBCUTANEOUS EVERY 8 HOURS
Refills: 0 | Status: DISCONTINUED | OUTPATIENT
Start: 2023-04-20 | End: 2023-04-23

## 2023-04-20 RX ORDER — PANTOPRAZOLE SODIUM 20 MG/1
40 TABLET, DELAYED RELEASE ORAL
Refills: 0 | Status: DISCONTINUED | OUTPATIENT
Start: 2023-04-20 | End: 2023-04-23

## 2023-04-20 RX ORDER — SODIUM CHLORIDE 9 MG/ML
1000 INJECTION, SOLUTION INTRAVENOUS
Refills: 0 | Status: DISCONTINUED | OUTPATIENT
Start: 2023-04-20 | End: 2023-04-20

## 2023-04-20 RX ORDER — SODIUM CHLORIDE 9 MG/ML
1000 INJECTION, SOLUTION INTRAVENOUS
Refills: 0 | Status: DISCONTINUED | OUTPATIENT
Start: 2023-04-20 | End: 2023-04-21

## 2023-04-20 RX ORDER — ACETAMINOPHEN 500 MG
975 TABLET ORAL EVERY 6 HOURS
Refills: 0 | Status: DISCONTINUED | OUTPATIENT
Start: 2023-04-20 | End: 2023-04-23

## 2023-04-20 RX ORDER — LOSARTAN POTASSIUM 100 MG/1
100 TABLET, FILM COATED ORAL DAILY
Refills: 0 | Status: DISCONTINUED | OUTPATIENT
Start: 2023-04-21 | End: 2023-04-23

## 2023-04-20 RX ORDER — ALBUTEROL 90 UG/1
1 AEROSOL, METERED ORAL EVERY 6 HOURS
Refills: 0 | Status: DISCONTINUED | OUTPATIENT
Start: 2023-04-20 | End: 2023-04-23

## 2023-04-20 RX ORDER — SENNA PLUS 8.6 MG/1
1 TABLET ORAL AT BEDTIME
Refills: 0 | Status: DISCONTINUED | OUTPATIENT
Start: 2023-04-20 | End: 2023-04-23

## 2023-04-20 RX ORDER — LIDOCAINE 4 G/100G
1 CREAM TOPICAL
Refills: 0 | Status: DISCONTINUED | OUTPATIENT
Start: 2023-04-20 | End: 2023-04-23

## 2023-04-20 RX ORDER — ATORVASTATIN CALCIUM 80 MG/1
40 TABLET, FILM COATED ORAL AT BEDTIME
Refills: 0 | Status: DISCONTINUED | OUTPATIENT
Start: 2023-04-20 | End: 2023-04-23

## 2023-04-20 RX ORDER — NIFEDIPINE 30 MG
60 TABLET, EXTENDED RELEASE 24 HR ORAL DAILY
Refills: 0 | Status: DISCONTINUED | OUTPATIENT
Start: 2023-04-20 | End: 2023-04-23

## 2023-04-20 RX ORDER — POLYETHYLENE GLYCOL 3350 17 G/17G
17 POWDER, FOR SOLUTION ORAL DAILY
Refills: 0 | Status: DISCONTINUED | OUTPATIENT
Start: 2023-04-20 | End: 2023-04-23

## 2023-04-20 RX ORDER — ASPIRIN/CALCIUM CARB/MAGNESIUM 324 MG
1 TABLET ORAL
Refills: 0 | DISCHARGE

## 2023-04-20 RX ORDER — ONDANSETRON 8 MG/1
4 TABLET, FILM COATED ORAL EVERY 6 HOURS
Refills: 0 | Status: DISCONTINUED | OUTPATIENT
Start: 2023-04-20 | End: 2023-04-23

## 2023-04-20 RX ORDER — ONDANSETRON 8 MG/1
4 TABLET, FILM COATED ORAL ONCE
Refills: 0 | Status: COMPLETED | OUTPATIENT
Start: 2023-04-20 | End: 2023-04-20

## 2023-04-20 RX ORDER — OXYBUTYNIN CHLORIDE 5 MG
5 TABLET ORAL EVERY 8 HOURS
Refills: 0 | Status: DISCONTINUED | OUTPATIENT
Start: 2023-04-20 | End: 2023-04-23

## 2023-04-20 RX ORDER — FINASTERIDE 5 MG/1
5 TABLET, FILM COATED ORAL DAILY
Refills: 0 | Status: DISCONTINUED | OUTPATIENT
Start: 2023-04-20 | End: 2023-04-23

## 2023-04-20 RX ORDER — HYDROMORPHONE HYDROCHLORIDE 2 MG/ML
0.5 INJECTION INTRAMUSCULAR; INTRAVENOUS; SUBCUTANEOUS
Refills: 0 | Status: DISCONTINUED | OUTPATIENT
Start: 2023-04-20 | End: 2023-04-20

## 2023-04-20 RX ORDER — TAMSULOSIN HYDROCHLORIDE 0.4 MG/1
0.4 CAPSULE ORAL AT BEDTIME
Refills: 0 | Status: DISCONTINUED | OUTPATIENT
Start: 2023-04-20 | End: 2023-04-23

## 2023-04-20 RX ORDER — METOPROLOL TARTRATE 50 MG
100 TABLET ORAL DAILY
Refills: 0 | Status: DISCONTINUED | OUTPATIENT
Start: 2023-04-21 | End: 2023-04-23

## 2023-04-20 RX ORDER — SODIUM CHLORIDE 9 MG/ML
1000 INJECTION, SOLUTION INTRAVENOUS ONCE
Refills: 0 | Status: COMPLETED | OUTPATIENT
Start: 2023-04-20 | End: 2023-04-20

## 2023-04-20 RX ORDER — OXYCODONE HYDROCHLORIDE 5 MG/1
5 TABLET ORAL EVERY 4 HOURS
Refills: 0 | Status: DISCONTINUED | OUTPATIENT
Start: 2023-04-20 | End: 2023-04-23

## 2023-04-20 RX ORDER — CIPROFLOXACIN LACTATE 400MG/40ML
400 VIAL (ML) INTRAVENOUS EVERY 12 HOURS
Refills: 0 | Status: DISCONTINUED | OUTPATIENT
Start: 2023-04-20 | End: 2023-04-23

## 2023-04-20 RX ORDER — DIAZEPAM 5 MG
2 TABLET ORAL ONCE
Refills: 0 | Status: DISCONTINUED | OUTPATIENT
Start: 2023-04-20 | End: 2023-04-20

## 2023-04-20 RX ADMIN — PANTOPRAZOLE SODIUM 40 MILLIGRAM(S): 20 TABLET, DELAYED RELEASE ORAL at 14:51

## 2023-04-20 RX ADMIN — HEPARIN SODIUM 5000 UNIT(S): 5000 INJECTION INTRAVENOUS; SUBCUTANEOUS at 13:49

## 2023-04-20 RX ADMIN — SODIUM CHLORIDE 1000 MILLILITER(S): 9 INJECTION, SOLUTION INTRAVENOUS at 15:44

## 2023-04-20 RX ADMIN — Medication 975 MILLIGRAM(S): at 18:30

## 2023-04-20 RX ADMIN — ALBUTEROL 1 PUFF(S): 90 AEROSOL, METERED ORAL at 17:28

## 2023-04-20 RX ADMIN — ONDANSETRON 4 MILLIGRAM(S): 8 TABLET, FILM COATED ORAL at 09:34

## 2023-04-20 RX ADMIN — LIDOCAINE 1 APPLICATION(S): 4 CREAM TOPICAL at 17:40

## 2023-04-20 RX ADMIN — TAMSULOSIN HYDROCHLORIDE 0.4 MILLIGRAM(S): 0.4 CAPSULE ORAL at 22:01

## 2023-04-20 RX ADMIN — SODIUM CHLORIDE 125 MILLILITER(S): 9 INJECTION, SOLUTION INTRAVENOUS at 13:49

## 2023-04-20 RX ADMIN — Medication 2 MILLIGRAM(S): at 16:06

## 2023-04-20 RX ADMIN — HEPARIN SODIUM 5000 UNIT(S): 5000 INJECTION INTRAVENOUS; SUBCUTANEOUS at 22:01

## 2023-04-20 RX ADMIN — SODIUM CHLORIDE 1000 MILLILITER(S): 9 INJECTION, SOLUTION INTRAVENOUS at 18:17

## 2023-04-20 RX ADMIN — SENNA PLUS 1 TABLET(S): 8.6 TABLET ORAL at 22:01

## 2023-04-20 RX ADMIN — Medication 200 MILLIGRAM(S): at 17:39

## 2023-04-20 RX ADMIN — ATORVASTATIN CALCIUM 40 MILLIGRAM(S): 80 TABLET, FILM COATED ORAL at 22:01

## 2023-04-20 RX ADMIN — Medication 5 MILLIGRAM(S): at 22:01

## 2023-04-20 RX ADMIN — Medication 5 MILLIGRAM(S): at 13:10

## 2023-04-20 RX ADMIN — LIDOCAINE 1 APPLICATION(S): 4 CREAM TOPICAL at 13:49

## 2023-04-20 RX ADMIN — Medication 975 MILLIGRAM(S): at 17:40

## 2023-04-20 NOTE — PROVIDER CONTACT NOTE (OTHER) - BACKGROUND
Patient s/p TURP. History of hypertension, BPH, CAD, Asthma, and HLD.

## 2023-04-20 NOTE — ASU DISCHARGE PLAN (ADULT/PEDIATRIC) - ASU DC SPECIAL INSTRUCTIONSFT
You may take over the counter pain medicine as needed for pain.     Complete course of antibiotic which was prescribed pre-operatively.    You may have intermittent blood tinged urine and slight flank pain when you urinate.  This is normal and due to the stent in your ureter.   If your urine becomes bright red or with clots, please call the office.     Followup with Dr. Moon on Tuesday for rios catheter removal. Please call to confirm appointment time.

## 2023-04-20 NOTE — PROGRESS NOTE ADULT - SUBJECTIVE AND OBJECTIVE BOX
Note    Post op Check    s/p : TURP    In PACU pt had episodes of clot retention and required multiple manual irrigations.  Additionally, pt was experiencing  bladder spasms and required Ditropan and Valium.  Pt also had several episodes of hypotension and tachycardia which responded to LR bolus x 2.  Pt's H/H was 14/42 immediately post op and rechecked and was 11.3/34, and 10.3/30.7.  Urine color improved on CBI and pt remained hemodynamically stable after aforementioned interventions and was cleared to go to floor.    Vital Signs Last 24 Hrs  T(C): 37.1 (20 Apr 2023 19:01), Max: 37.1 (20 Apr 2023 14:00)  T(F): 98.8 (20 Apr 2023 19:01), Max: 98.8 (20 Apr 2023 14:00)  HR: 99 (20 Apr 2023 19:01) (47 - 110)  BP: 121/69 (20 Apr 2023 19:01) (67/39 - 145/82)  BP(mean): 81 (20 Apr 2023 19:00) (50 - 106)  RR: 16 (20 Apr 2023 19:01) (8 - 25)  SpO2: 100% (20 Apr 2023 19:01) (93% - 100%)    Parameters below as of 20 Apr 2023 19:01  Patient On (Oxygen Delivery Method): room air        I&O's Summary    20 Apr 2023 07:01  -  20 Apr 2023 20:49  --------------------------------------------------------  IN: 66182 mL / OUT: 8050 mL / NET: 2890 mL        PHYSICAL EXAM:       Constitutional: awake alert NAD    Respiratory: No resp distress    Cardiovascular: RRR    Gastrointestinal: soft NT ND    Genitourinary: rios in place, CBI in progress, draining well, manually irrigates well    Extremities: + venodynes                              10.3   16.59 )-----------( 263      ( 20 Apr 2023 18:10 )             30.7       04-20    133<L>  |  102  |  18  ----------------------------<  163<H>  4.0   |  17<L>  |  0.77    Ca    7.5<L>      20 Apr 2023 15:27    TPro  3.6<L>  /  Alb  2.3<L>  /  TBili  0.8  /  DBili  x   /  AST  14  /  ALT  10  /  AlkPhos  45  04-20

## 2023-04-20 NOTE — ASU DISCHARGE PLAN (ADULT/PEDIATRIC) - NS MD DC FALL RISK RISK
For information on Fall & Injury Prevention, visit: [email or web address] OR  [email or web address] OR  https://www.cdc.gov/steadi/patient.html

## 2023-04-20 NOTE — PROVIDER CONTACT NOTE (OTHER) - RECOMMENDATIONS
Notify urology. Administer fluids or blood.
Notify urology. Administer fluids or blood.
Notify urology. Repeat CBC.

## 2023-04-20 NOTE — PATIENT PROFILE ADULT - FALL HARM RISK - HARM RISK INTERVENTIONS

## 2023-04-20 NOTE — PROVIDER CONTACT NOTE (OTHER) - ASSESSMENT
Patient s/p TURP. Patients BP 90/64. Patient sleepy.
Patient s/p TURP. Patients BP 83/66, and .
Patient s/p TURP. Horace red blood collecting in rios bag. Patient tachycardic 101. Patient complaining of pressure and bladder spasms.

## 2023-04-20 NOTE — PATIENT PROFILE ADULT - NS PRO AD NO ADVANCE DIRECTIVE
PT DAILY TREATMENT NOTE     Patient Name: Ez Huston IV  Date:2021  : 1995  [x]  Patient  Verified  Payor: BLUE CROSS / Plan: [facility] PPO / Product Type: PPO /    In time: 8:18  Out time: 9:10  Total Treatment Time (min): 52  Visit #: 4 of     Medicare/BCBS Only   Total Timed Codes (min):  42 1:1 Treatment Time:  42       Treatment Area: Left knee pain [M25.562]    SUBJECTIVE  Pain Level (0-10 scale):  3/10  Any medication changes, allergies to medications, adverse drug reactions, diagnosis change, or new procedure performed?: [x] No    [] Yes (see summary sheet for update)  Subjective functional status/changes:   [] No changes reported  Pt. Reports he was sore over the weekend. Pain is on inside of his knee.      OBJECTIVE    Modality rationale: decrease pain and increase tissue extensibility to improve the patients ability to increase ease of ADLs   Min Type Additional Details    [] Estim:  []Unatt       []IFC  []Premod                        []Other:  []w/ice   []w/heat  Position:  Location:    [] Estim: []Att    []TENS instruct  []NMES                    []Other:  []w/US   []w/ice   []w/heat  Position:  Location:    []  Traction: [] Cervical       []Lumbar                       [] Prone          []Supine                       []Intermittent   []Continuous Lbs:  [] before manual  [] after manual    []  Ultrasound: []Continuous   [] Pulsed                           []1MHz   []3MHz W/cm2:  Location:    []  Iontophoresis with dexamethasone         Location: [] Take home patch   [] In clinic   10 [x]  Ice     []  heat  []  Ice massage  []  Laser   []  Anodyne Position: supine  Location: left knee    []  Laser with stim  []  Other:  Position:  Location:    []  Vasopneumatic Device    []  Right     []  Left  Pre-treatment girth:  Post-treatment girth:  Measured at (location):  Pressure:       [] lo [] med [] hi   Temperature: [] lo [] med [] hi   [x] Skin assessment post-treatment:  [x]intact []redness- no adverse reaction    []redness  adverse reaction:     32 min Therapeutic Exercise:  [x] See flow sheet :   Rationale: increase ROM and increase strength to improve the patients ability to increase ease of ADLs    10 min Manual Therapy:  Medial patella mobs, trigger point release to distal quads   The manual therapy interventions were performed at a separate and distinct time from the therapeutic activities interventions. Rationale: decrease pain, increase ROM and increase tissue extensibility to increase ease of ambulation           With   [x] TE   [] TA   [] neuro   [] other: Patient Education: [x] Review HEP    [] Progressed/Changed HEP based on:   [] positioning   [] body mechanics   [] transfers   [] heat/ice application    [] other:      Other Objective/Functional Measures:   Left SLS: 30 seconds  Left patella hypomobility compared to right side, especially going medially  Pt. Was educated on patella mobs and self trigger point release for HEP   Required cues to improve form during SLR    Pain Level (0-10 scale) post treatment:  2-3/10    ASSESSMENT/Changes in Function:  Pt. Is progressing slowly towards goals. He continues to have medial knee pain with ADLs. He continues to demonstrates decreased left hip strength and decreased flexibility. He demonstrates improving single leg stability on left. Patient will continue to benefit from skilled PT services to modify and progress therapeutic interventions, address functional mobility deficits, address ROM deficits, address strength deficits, analyze and address soft tissue restrictions, analyze and cue movement patterns, analyze and modify body mechanics/ergonomics and assess and modify postural abnormalities to attain remaining goals. Progress towards goals / Updated goals:  Short Term Goals: To be accomplished in 1 weeks:               1.  Pt will be independent with a basic home exercise program to take an HCP given in pst/No active role in their rehabilitation process. Current: Goal Met: reports full compliance and demonstrates ind with prescribed HEP, 07/27/21     Long Term Goals: To be accomplished in 5 weeks:               1. Pt will increase FOTO score by   pts to improve function.                2. 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              3. Pt will increase left quad and HS to 5/5 or greater to ease with improving stability to prevent further injury.              4. Pt will perform left SLS x 30 sec to be able to return to high level activity. Met (8/2/21)               5. Patient will demonstrate a good understanding of their condition and strategies for self-management.     PLAN  []  Upgrade activities as tolerated     [x]  Continue plan of care  []  Update interventions per flow sheet       []  Discharge due to:_  []  Other:_      Betzaida Huff, PT 8/2/2021  7:43 AM    Future Appointments   Date Time Provider Angel Valle   8/2/2021  8:15 AM Regine Ro, PT [email or web address]   8/5/2021  9:00 AM Caitlin Gorman, PT [email or web address]   8/9/2021  9:00 AM Ellis Maldonado, PTA [facility]   8/12/2021  8:15 AM Regine Ro, PT [email or web address]   8/16/2021  8:15 AM Regine Ro, PT [facility]   8/19/2021  8:15 AM Regine Ro, PT [email or web address]   8/23/2021  8:15 AM Regine Ro, PT [email or web address]   8/26/2021  8:15 AM Regine Ro, PT [email or web address]   8/30/2021  9:00 AM Ellis Maldonado, PTA [facility]   9/2/2021  8:15 AM Regine Ro, PT [email or web address]

## 2023-04-20 NOTE — PROVIDER CONTACT NOTE (OTHER) - REASON
Patient continuing to have eneida red blood from rios. Patients .
Patient continues to have low BP.
Patients BP 83/66 and

## 2023-04-20 NOTE — ASU DISCHARGE PLAN (ADULT/PEDIATRIC) - CARE PROVIDER_API CALL
Sam Moon  Urology  [address] [place] [address]  Phone: [phone]  Fax: [phone]  Follow Up Time:

## 2023-04-20 NOTE — PROVIDER CONTACT NOTE (OTHER) - SITUATION
Patient s/p TURP. Horace red blood collecting in rios bag. Patient tachycardic to 101.
Patient s/p TURP. Patients BP 83/66 and .
Patient s/p TURP. Patients BP 90/64.

## 2023-04-20 NOTE — PROGRESS NOTE ADULT - PROBLEM SELECTOR PLAN 1
Cont CBI  Manual irrigation PRN  Analgesia Antiemetics PRN  DVT prophylaxis  Incentive spirometry  Reg / OOB  AM labs

## 2023-04-20 NOTE — PROVIDER CONTACT NOTE (OTHER) - ACTION/TREATMENT ORDERED:
Urology made aware. Urology came to bedside. Glaser flushed by urology. No other interventions ordered at this time.
Urology made aware and ok with BP. No interventions ordered at this time.
Urology made aware. CBC to be ordered and 1L bolus of fluids to be ordered.

## 2023-04-21 DIAGNOSIS — E78.5 HYPERLIPIDEMIA, UNSPECIFIED: ICD-10-CM

## 2023-04-21 DIAGNOSIS — I10 ESSENTIAL (PRIMARY) HYPERTENSION: ICD-10-CM

## 2023-04-21 DIAGNOSIS — Z95.5 PRESENCE OF CORONARY ANGIOPLASTY IMPLANT AND GRAFT: ICD-10-CM

## 2023-04-21 DIAGNOSIS — K21.9 GASTRO-ESOPHAGEAL REFLUX DISEASE WITHOUT ESOPHAGITIS: ICD-10-CM

## 2023-04-21 LAB
ANION GAP SERPL CALC-SCNC: 10 MMOL/L — SIGNIFICANT CHANGE UP (ref 7–14)
BASOPHILS # BLD AUTO: 0.01 K/UL — SIGNIFICANT CHANGE UP (ref 0–0.2)
BASOPHILS NFR BLD AUTO: 0.1 % — SIGNIFICANT CHANGE UP (ref 0–2)
BUN SERPL-MCNC: 24 MG/DL — HIGH (ref 7–23)
CALCIUM SERPL-MCNC: 7.7 MG/DL — LOW (ref 8.4–10.5)
CHLORIDE SERPL-SCNC: 104 MMOL/L — SIGNIFICANT CHANGE UP (ref 98–107)
CO2 SERPL-SCNC: 23 MMOL/L — SIGNIFICANT CHANGE UP (ref 22–31)
CREAT SERPL-MCNC: 1.14 MG/DL — SIGNIFICANT CHANGE UP (ref 0.5–1.3)
EGFR: 70 ML/MIN/1.73M2 — SIGNIFICANT CHANGE UP
EOSINOPHIL # BLD AUTO: 0 K/UL — SIGNIFICANT CHANGE UP (ref 0–0.5)
EOSINOPHIL NFR BLD AUTO: 0 % — SIGNIFICANT CHANGE UP (ref 0–6)
GLUCOSE SERPL-MCNC: 164 MG/DL — HIGH (ref 70–99)
HCT VFR BLD CALC: 22.7 % — LOW (ref 39–50)
HCT VFR BLD CALC: 24.4 % — LOW (ref 39–50)
HGB BLD-MCNC: 7.7 G/DL — LOW (ref 13–17)
HGB BLD-MCNC: 8.2 G/DL — LOW (ref 13–17)
IANC: 8.67 K/UL — HIGH (ref 1.8–7.4)
IMM GRANULOCYTES NFR BLD AUTO: 0.5 % — SIGNIFICANT CHANGE UP (ref 0–0.9)
LYMPHOCYTES # BLD AUTO: 1.47 K/UL — SIGNIFICANT CHANGE UP (ref 1–3.3)
LYMPHOCYTES # BLD AUTO: 12.6 % — LOW (ref 13–44)
MCHC RBC-ENTMCNC: 31.8 PG — SIGNIFICANT CHANGE UP (ref 27–34)
MCHC RBC-ENTMCNC: 32 PG — SIGNIFICANT CHANGE UP (ref 27–34)
MCHC RBC-ENTMCNC: 33.6 GM/DL — SIGNIFICANT CHANGE UP (ref 32–36)
MCHC RBC-ENTMCNC: 33.9 GM/DL — SIGNIFICANT CHANGE UP (ref 32–36)
MCV RBC AUTO: 93.8 FL — SIGNIFICANT CHANGE UP (ref 80–100)
MCV RBC AUTO: 95.3 FL — SIGNIFICANT CHANGE UP (ref 80–100)
MONOCYTES # BLD AUTO: 1.44 K/UL — HIGH (ref 0–0.9)
MONOCYTES NFR BLD AUTO: 12.4 % — SIGNIFICANT CHANGE UP (ref 2–14)
NEUTROPHILS # BLD AUTO: 8.67 K/UL — HIGH (ref 1.8–7.4)
NEUTROPHILS NFR BLD AUTO: 74.4 % — SIGNIFICANT CHANGE UP (ref 43–77)
NRBC # BLD: 0 /100 WBCS — SIGNIFICANT CHANGE UP (ref 0–0)
NRBC # BLD: 0 /100 WBCS — SIGNIFICANT CHANGE UP (ref 0–0)
NRBC # FLD: 0 K/UL — SIGNIFICANT CHANGE UP (ref 0–0)
NRBC # FLD: 0 K/UL — SIGNIFICANT CHANGE UP (ref 0–0)
PLATELET # BLD AUTO: 211 K/UL — SIGNIFICANT CHANGE UP (ref 150–400)
PLATELET # BLD AUTO: 225 K/UL — SIGNIFICANT CHANGE UP (ref 150–400)
POTASSIUM SERPL-MCNC: 4.9 MMOL/L — SIGNIFICANT CHANGE UP (ref 3.5–5.3)
POTASSIUM SERPL-SCNC: 4.9 MMOL/L — SIGNIFICANT CHANGE UP (ref 3.5–5.3)
RBC # BLD: 2.42 M/UL — LOW (ref 4.2–5.8)
RBC # BLD: 2.56 M/UL — LOW (ref 4.2–5.8)
RBC # FLD: 13.1 % — SIGNIFICANT CHANGE UP (ref 10.3–14.5)
RBC # FLD: 13.1 % — SIGNIFICANT CHANGE UP (ref 10.3–14.5)
SODIUM SERPL-SCNC: 137 MMOL/L — SIGNIFICANT CHANGE UP (ref 135–145)
WBC # BLD: 11.65 K/UL — HIGH (ref 3.8–10.5)
WBC # BLD: 14.75 K/UL — HIGH (ref 3.8–10.5)
WBC # FLD AUTO: 11.65 K/UL — HIGH (ref 3.8–10.5)
WBC # FLD AUTO: 14.75 K/UL — HIGH (ref 3.8–10.5)

## 2023-04-21 PROCEDURE — 99223 1ST HOSP IP/OBS HIGH 75: CPT

## 2023-04-21 RX ORDER — SODIUM CHLORIDE 9 MG/ML
1000 INJECTION, SOLUTION INTRAVENOUS
Refills: 0 | Status: DISCONTINUED | OUTPATIENT
Start: 2023-04-21 | End: 2023-04-23

## 2023-04-21 RX ADMIN — Medication 975 MILLIGRAM(S): at 05:47

## 2023-04-21 RX ADMIN — HEPARIN SODIUM 5000 UNIT(S): 5000 INJECTION INTRAVENOUS; SUBCUTANEOUS at 14:42

## 2023-04-21 RX ADMIN — Medication 975 MILLIGRAM(S): at 06:47

## 2023-04-21 RX ADMIN — ATORVASTATIN CALCIUM 40 MILLIGRAM(S): 80 TABLET, FILM COATED ORAL at 22:16

## 2023-04-21 RX ADMIN — Medication 200 MILLIGRAM(S): at 18:31

## 2023-04-21 RX ADMIN — TAMSULOSIN HYDROCHLORIDE 0.4 MILLIGRAM(S): 0.4 CAPSULE ORAL at 22:16

## 2023-04-21 RX ADMIN — Medication 975 MILLIGRAM(S): at 14:42

## 2023-04-21 RX ADMIN — PANTOPRAZOLE SODIUM 40 MILLIGRAM(S): 20 TABLET, DELAYED RELEASE ORAL at 05:49

## 2023-04-21 RX ADMIN — Medication 200 MILLIGRAM(S): at 05:47

## 2023-04-21 RX ADMIN — Medication 975 MILLIGRAM(S): at 15:42

## 2023-04-21 RX ADMIN — HEPARIN SODIUM 5000 UNIT(S): 5000 INJECTION INTRAVENOUS; SUBCUTANEOUS at 05:47

## 2023-04-21 RX ADMIN — SENNA PLUS 1 TABLET(S): 8.6 TABLET ORAL at 22:15

## 2023-04-21 RX ADMIN — POLYETHYLENE GLYCOL 3350 17 GRAM(S): 17 POWDER, FOR SOLUTION ORAL at 14:42

## 2023-04-21 RX ADMIN — HEPARIN SODIUM 5000 UNIT(S): 5000 INJECTION INTRAVENOUS; SUBCUTANEOUS at 22:16

## 2023-04-21 RX ADMIN — FINASTERIDE 5 MILLIGRAM(S): 5 TABLET, FILM COATED ORAL at 14:42

## 2023-04-21 NOTE — PROGRESS NOTE ADULT - SUBJECTIVE AND OBJECTIVE BOX
Overnight events:  In PACU pt had episodes of clot retention and required multiple manual irrigations.  Additionally, pt was experiencing  bladder spasms and required Ditropan and Valium.  Pt also had several episodes of hypotension and tachycardia which responded to LR bolus x 2.  Pt's H/H was 14/42 immediately post op and rechecked and was 11.3/34, and 10.3/30.7.  Urine color improved on CBI and pt remained hemodynamically stable after aforementioned interventions and was cleared to go to floor.  CBI remained clear and was clamped this AM    Subjective:  Pt offers no complaints, had BM this AM    Objective:    Vital signs  T(C): , Max: 37.1 (04-20-23 @ 14:00)  HR: 71 (04-21-23 @ 05:44)  BP: 108/57 (04-21-23 @ 05:44)  SpO2: 99% (04-21-23 @ 05:44)  Wt(kg): --    Output   Rios: punch, irrigated to clear no clots  04-20 @ 07:01  -  04-21 @ 07:00  --------------------------------------------------------  IN: 28719 mL / OUT: 8050 mL / NET: 2890 mL        Gen: NAD  Abd: Soft, nontender  : rios irrigated to clear no clots rescured    Labs                        8.2    11.65 )-----------( 211      ( 21 Apr 2023 05:40 )             24.4     21 Apr 2023 05:40    137    |  104    |  24     ----------------------------<  164    4.9     |  23     |  1.14     Ca    7.7        21 Apr 2023 05:40

## 2023-04-21 NOTE — CONSULT NOTE ADULT - ASSESSMENT
67M HTN, HLD, CAD s/p stent, GERD, and BPH with persistent LUTS who is now s/p TURP 4/20/23.    ***INCOMPLETE*** 67M HTN, HLD, CAD s/p stent, GERD, and BPH with persistent LUTS who is now s/p TURP 4/20/23.

## 2023-04-21 NOTE — PROGRESS NOTE ADULT - ASSESSMENT
68 yo M s/p TURP 4/20/2023, in PACU pt had episodes of clot retention and required multiple manual irrigations.  Additionally, pt was experiencing  bladder spasms and required Ditropan and Valium.  Pt also had several episodes of hypotension and tachycardia which responded to LR bolus x 2.  Pt's H/H was 14/42 immediately post op and rechecked and was 11.3/34, and 10.3/30.7.  Urine color improved on CBI and pt remained hemodynamically stable after aforementioned interventions and was cleared to go to floor.    4/21: Pt offers no complaints, had BM this AM, CBI was clamped earlier this AM, punch now after BM, irrigated to clear no clots    Plan:  -AM labs reviewed, repeat CBC at noon  -continue rios for now, monitor color, rios plan TBD  -IVM  -continue cipro  -f/u medicine  -continue to hold ASA  -DVT prophy, IS, OOB, ambulate

## 2023-04-21 NOTE — CONSULT NOTE ADULT - PROBLEM SELECTOR RECOMMENDATION 4
Pt reports 1 coronary stent ~12 years ago. Maintained on asa 81mg daily for many years and last stress test ~4-6 months ago was reportedly normal. No cardiac symptoms or signs at this time.

## 2023-04-21 NOTE — CONSULT NOTE ADULT - SUBJECTIVE AND OBJECTIVE BOX
HPI:  67M HTN, HLD, CAD s/p stent, GERD, and BPH with persistent LUTS who is now s/p TURP 4/20/23. Post-op course notable for clot retention in PACU requiring multiple irrigations, as well as bladder spasms requiring ditropan and valium. Pt currently comfortable on CBI. Had BM this morning.     ***INCOMPLETE***    PMHx:  BPH (benign prostatic hyperplasia)   GERD (gastroesophageal reflux disease)   HLD (hyperlipidemia)   HTN (hypertension)   S/P coronary artery stent placement.     PMHx:  Kidney stones.    Meds:  · 	tamsulosin 0.4 mg oral capsule: Last Dose Taken:  , orally once a day  · 	finasteride 5 mg oral tablet: Last Dose Taken:  , 1 orally once a day  · 	losartan 100 mg oral tablet: Last Dose Taken:  , 1 orally once a day  · 	atorvastatin 40 mg oral tablet: Last Dose Taken:  , 1 orally once a day  · 	metoprolol succinate 100 mg oral capsule, extended release: Last Dose Taken:  , 1 orally once a day  · 	omeprazole 20 mg oral delayed release capsule: Last Dose Taken:  , 1 orally once a day  · 	Albuterol (Eqv-ProAir HFA) 90 mcg/inh inhalation aerosol: Last Dose Taken:  , 1 inhaled as needed for  shortness of breath and/or wheezing  · 	NIFEdipine (Eqv-Adalat CC) 60 mg oral tablet, extended release: Last Dose Taken:  , 1 orally once a day  · 	aspirin 325 mg oral capsule: Last Dose Taken:  , 1 orally once a day    Allergies:  NKDA    FHx:  None in first degree relatives    SHx:  No tobacco, etoh, or other drugs    PHYSICAL EXAM:  Vital Signs Last 24 Hrs  T(C): 37.1 (21 Apr 2023 09:17), Max: 37.1 (20 Apr 2023 14:00)  T(F): 98.7 (21 Apr 2023 09:17), Max: 98.8 (20 Apr 2023 14:00)  HR: 94 (21 Apr 2023 09:17) (47 - 110)  BP: 102/59 (21 Apr 2023 09:17) (67/39 - 142/84)  BP(mean): 81 (20 Apr 2023 19:00) (50 - 103)  RR: 17 (21 Apr 2023 09:17) (8 - 25)  SpO2: 97% (21 Apr 2023 09:17) (95% - 100%)    CONSTITUTIONAL: NAD, well-developed, well-groomed  EYES: PERRLA; conjunctiva and sclera clear  ENMT: Moist oral mucosa, no pharyngeal injection or exudates; normal dentition  NECK: Supple, no palpable masses; no thyromegaly  RESPIRATORY: Normal respiratory effort; lungs are clear to auscultation bilaterally  CARDIOVASCULAR: Regular rate and rhythm, normal S1 and S2, no murmur/rub/gallop; No lower extremity edema; Peripheral pulses are 2+ bilaterally  ABDOMEN: Nontender to palpation, normoactive bowel sounds, no rebound/guarding; No hepatosplenomegaly  MUSCULOSKELETAL:  Normal gait; no clubbing or cyanosis of digits; no joint swelling or tenderness to palpation  PSYCH: A+O to person, place, and time; affect appropriate  NEUROLOGY: CN 2-12 are intact and symmetric; no gross sensory deficits   SKIN: No rashes; no palpable lesions    LABS:                        8.2    11.65 )-----------( 211      ( 21 Apr 2023 05:40 )             24.4     04-21    137  |  104  |  24<H>  ----------------------------<  164<H>  4.9   |  23  |  1.14    Ca    7.7<L>      21 Apr 2023 05:40  TPro  3.6<L>  /  Alb  2.3<L>  /  TBili  0.8  /  DBili  x   /  AST  14  /  ALT  10  /  AlkPhos  45  04-20  PT/INR - ( 20 Apr 2023 15:15 )   PT: 16.6 sec;   INR: 1.43 ratio    PTT - ( 20 Apr 2023 15:15 )  PTT:22.4 sec   HPI:  67M HTN, HLD, CAD s/p stent, GERD, and BPH with persistent LUTS who is now s/p TURP 4/20/23. Post-op course notable for clot retention in PACU requiring multiple irrigations, as well as bladder spasms requiring ditropan and valium. Pt currently comfortable on CBI. Had BM yesterday and today, tolerating diet. Denies chest pain, sob, lightheadedness, palpitations, N/V.    PMHx:  BPH (benign prostatic hyperplasia)   GERD (gastroesophageal reflux disease)   HLD (hyperlipidemia)   HTN (hypertension)   S/P coronary artery stent placement.     PMHx:  Kidney stones.    Meds:  · 	tamsulosin 0.4 mg oral capsule: Last Dose Taken:  , orally once a day  · 	finasteride 5 mg oral tablet: Last Dose Taken:  , 1 orally once a day  · 	losartan 100 mg oral tablet: Last Dose Taken:  , 1 orally once a day  · 	atorvastatin 40 mg oral tablet: Last Dose Taken:  , 1 orally once a day  · 	metoprolol succinate 100 mg oral capsule, extended release: Last Dose Taken:  , 1 orally once a day  · 	omeprazole 20 mg oral delayed release capsule: Last Dose Taken:  , 1 orally once a day  · 	Albuterol (Eqv-ProAir HFA) 90 mcg/inh inhalation aerosol: Last Dose Taken:  , 1 inhaled as needed for  shortness of breath and/or wheezing  · 	NIFEdipine (Eqv-Adalat CC) 60 mg oral tablet, extended release: Last Dose Taken:  , 1 orally once a day  · 	aspirin 325 mg oral capsule: Last Dose Taken:  , 1 orally once a day    Allergies:  NKDA    FHx:  None in first degree relatives    SHx:  No tobacco, etoh, or other drugs    PHYSICAL EXAM:  Vital Signs Last 24 Hrs  T(C): 37.1 (21 Apr 2023 09:17), Max: 37.1 (20 Apr 2023 14:00)  T(F): 98.7 (21 Apr 2023 09:17), Max: 98.8 (20 Apr 2023 14:00)  HR: 94 (21 Apr 2023 09:17) (47 - 110)  BP: 102/59 (21 Apr 2023 09:17) (67/39 - 142/84)  BP(mean): 81 (20 Apr 2023 19:00) (50 - 103)  RR: 17 (21 Apr 2023 09:17) (8 - 25)  SpO2: 97% (21 Apr 2023 09:17) (95% - 100%)    CONSTITUTIONAL: NAD, well-developed, well-groomed  EYES: PERRLA; conjunctiva and sclera clear  ENMT: Moist oral mucosa, no pharyngeal injection or exudates; normal dentition  NECK: Supple, no palpable masses; no thyromegaly  RESPIRATORY: Normal respiratory effort; lungs are clear to auscultation bilaterally  CARDIOVASCULAR: Regular rate and rhythm, normal S1 and S2, no murmur/rub/gallop; No lower extremity edema; Peripheral pulses are 2+ bilaterally  ABDOMEN: Nontender to palpation, normoactive bowel sounds, no rebound/guarding; No hepatosplenomegaly  MUSCULOSKELETAL:  Normal gait; no clubbing or cyanosis of digits; no joint swelling or tenderness to palpation  : rios bag with gross hematuria, currently on CBI  PSYCH: A+O to person, place, and time; affect appropriate  NEUROLOGY: CN 2-12 are intact and symmetric; no gross sensory deficits   SKIN: No rashes; no palpable lesions    LABS:                        8.2    11.65 )-----------( 211      ( 21 Apr 2023 05:40 )             24.4     04-21    137  |  104  |  24<H>  ----------------------------<  164<H>  4.9   |  23  |  1.14    Ca    7.7<L>      21 Apr 2023 05:40  TPro  3.6<L>  /  Alb  2.3<L>  /  TBili  0.8  /  DBili  x   /  AST  14  /  ALT  10  /  AlkPhos  45  04-20  PT/INR - ( 20 Apr 2023 15:15 )   PT: 16.6 sec;   INR: 1.43 ratio    PTT - ( 20 Apr 2023 15:15 )  PTT:22.4 sec

## 2023-04-21 NOTE — CONSULT NOTE ADULT - PROBLEM SELECTOR RECOMMENDATION 9
Now s/p TURP. Pt remains on CBI for hematuria/clots. Continues on tamsulosin. Post-op care per Urology.

## 2023-04-22 DIAGNOSIS — D62 ACUTE POSTHEMORRHAGIC ANEMIA: ICD-10-CM

## 2023-04-22 LAB
ANION GAP SERPL CALC-SCNC: 8 MMOL/L — SIGNIFICANT CHANGE UP (ref 7–14)
BUN SERPL-MCNC: 21 MG/DL — SIGNIFICANT CHANGE UP (ref 7–23)
CALCIUM SERPL-MCNC: 7.8 MG/DL — LOW (ref 8.4–10.5)
CHLORIDE SERPL-SCNC: 108 MMOL/L — HIGH (ref 98–107)
CO2 SERPL-SCNC: 25 MMOL/L — SIGNIFICANT CHANGE UP (ref 22–31)
CREAT SERPL-MCNC: 1.05 MG/DL — SIGNIFICANT CHANGE UP (ref 0.5–1.3)
EGFR: 77 ML/MIN/1.73M2 — SIGNIFICANT CHANGE UP
GLUCOSE SERPL-MCNC: 106 MG/DL — HIGH (ref 70–99)
HCT VFR BLD CALC: 25.6 % — LOW (ref 39–50)
HGB BLD-MCNC: 8.3 G/DL — LOW (ref 13–17)
MCHC RBC-ENTMCNC: 29.1 PG — SIGNIFICANT CHANGE UP (ref 27–34)
MCHC RBC-ENTMCNC: 32.4 GM/DL — SIGNIFICANT CHANGE UP (ref 32–36)
MCV RBC AUTO: 89.8 FL — SIGNIFICANT CHANGE UP (ref 80–100)
NRBC # BLD: 0 /100 WBCS — SIGNIFICANT CHANGE UP (ref 0–0)
NRBC # FLD: 0 K/UL — SIGNIFICANT CHANGE UP (ref 0–0)
PLATELET # BLD AUTO: 182 K/UL — SIGNIFICANT CHANGE UP (ref 150–400)
POTASSIUM SERPL-MCNC: 3.9 MMOL/L — SIGNIFICANT CHANGE UP (ref 3.5–5.3)
POTASSIUM SERPL-SCNC: 3.9 MMOL/L — SIGNIFICANT CHANGE UP (ref 3.5–5.3)
RBC # BLD: 2.85 M/UL — LOW (ref 4.2–5.8)
RBC # FLD: 17.6 % — HIGH (ref 10.3–14.5)
SODIUM SERPL-SCNC: 141 MMOL/L — SIGNIFICANT CHANGE UP (ref 135–145)
WBC # BLD: 11.32 K/UL — HIGH (ref 3.8–10.5)
WBC # FLD AUTO: 11.32 K/UL — HIGH (ref 3.8–10.5)

## 2023-04-22 PROCEDURE — 99232 SBSQ HOSP IP/OBS MODERATE 35: CPT

## 2023-04-22 RX ORDER — ASPIRIN/CALCIUM CARB/MAGNESIUM 324 MG
81 TABLET ORAL DAILY
Refills: 0 | Status: DISCONTINUED | OUTPATIENT
Start: 2023-04-22 | End: 2023-04-23

## 2023-04-22 RX ADMIN — Medication 975 MILLIGRAM(S): at 23:40

## 2023-04-22 RX ADMIN — Medication 975 MILLIGRAM(S): at 14:33

## 2023-04-22 RX ADMIN — Medication 60 MILLIGRAM(S): at 05:44

## 2023-04-22 RX ADMIN — Medication 100 MILLIGRAM(S): at 05:44

## 2023-04-22 RX ADMIN — Medication 5 MILLIGRAM(S): at 21:48

## 2023-04-22 RX ADMIN — HEPARIN SODIUM 5000 UNIT(S): 5000 INJECTION INTRAVENOUS; SUBCUTANEOUS at 13:41

## 2023-04-22 RX ADMIN — Medication 975 MILLIGRAM(S): at 19:14

## 2023-04-22 RX ADMIN — HEPARIN SODIUM 5000 UNIT(S): 5000 INJECTION INTRAVENOUS; SUBCUTANEOUS at 05:44

## 2023-04-22 RX ADMIN — PANTOPRAZOLE SODIUM 40 MILLIGRAM(S): 20 TABLET, DELAYED RELEASE ORAL at 05:45

## 2023-04-22 RX ADMIN — POLYETHYLENE GLYCOL 3350 17 GRAM(S): 17 POWDER, FOR SOLUTION ORAL at 13:41

## 2023-04-22 RX ADMIN — HEPARIN SODIUM 5000 UNIT(S): 5000 INJECTION INTRAVENOUS; SUBCUTANEOUS at 21:36

## 2023-04-22 RX ADMIN — Medication 975 MILLIGRAM(S): at 13:40

## 2023-04-22 RX ADMIN — Medication 200 MILLIGRAM(S): at 18:09

## 2023-04-22 RX ADMIN — FINASTERIDE 5 MILLIGRAM(S): 5 TABLET, FILM COATED ORAL at 13:41

## 2023-04-22 RX ADMIN — Medication 200 MILLIGRAM(S): at 05:42

## 2023-04-22 RX ADMIN — Medication 975 MILLIGRAM(S): at 18:14

## 2023-04-22 RX ADMIN — ATORVASTATIN CALCIUM 40 MILLIGRAM(S): 80 TABLET, FILM COATED ORAL at 21:36

## 2023-04-22 RX ADMIN — Medication 81 MILLIGRAM(S): at 13:46

## 2023-04-22 RX ADMIN — TAMSULOSIN HYDROCHLORIDE 0.4 MILLIGRAM(S): 0.4 CAPSULE ORAL at 21:34

## 2023-04-22 RX ADMIN — SENNA PLUS 1 TABLET(S): 8.6 TABLET ORAL at 21:34

## 2023-04-22 RX ADMIN — Medication 975 MILLIGRAM(S): at 05:43

## 2023-04-22 RX ADMIN — LOSARTAN POTASSIUM 100 MILLIGRAM(S): 100 TABLET, FILM COATED ORAL at 05:44

## 2023-04-22 NOTE — PROGRESS NOTE ADULT - SUBJECTIVE AND OBJECTIVE BOX
Surgery Progress Note     Subjective/24hour Events:   Patient seen and examined.   No acute events overnight. Doing well. CBI clamped this AM, urine color punch red, CBI restarted to slow drip.   Pain controlled.     Vital Signs:  Vital Signs Last 24 Hrs  T(C): 37 (22 Apr 2023 05:38), Max: 37.1 (21 Apr 2023 09:17)  T(F): 98.6 (22 Apr 2023 05:38), Max: 98.8 (21 Apr 2023 17:35)  HR: 81 (22 Apr 2023 05:38) (78 - 97)  BP: 133/67 (22 Apr 2023 05:38) (101/57 - 133/67)  BP(mean): --  RR: 16 (22 Apr 2023 05:38) (16 - 18)  SpO2: 98% (22 Apr 2023 05:38) (97% - 100%)    Parameters below as of 22 Apr 2023 01:23  Patient On (Oxygen Delivery Method): room air        CAPILLARY BLOOD GLUCOSE          I&O's Detail    21 Apr 2023 07:01  -  22 Apr 2023 07:00  --------------------------------------------------------  IN:    IV PiggyBack: 200 mL    PRBCs (Packed Red Blood Cells): 350 mL  Total IN: 550 mL    OUT:  Total OUT: 0 mL    Total NET: 550 mL          MEDICATIONS  (STANDING):  acetaminophen     Tablet .. 975 milliGRAM(s) Oral every 6 hours  atorvastatin 40 milliGRAM(s) Oral at bedtime  ciprofloxacin   IVPB 400 milliGRAM(s) IV Intermittent every 12 hours  dextrose 5% + sodium chloride 0.45%. 1000 milliLiter(s) (75 mL/Hr) IV Continuous <Continuous>  finasteride 5 milliGRAM(s) Oral daily  heparin   Injectable 5000 Unit(s) SubCutaneous every 8 hours  lidocaine 5% Ointment 1 Application(s) Topical four times a day  losartan 100 milliGRAM(s) Oral daily  metoprolol succinate  milliGRAM(s) Oral daily  NIFEdipine XL 60 milliGRAM(s) Oral daily  pantoprazole    Tablet 40 milliGRAM(s) Oral before breakfast  polyethylene glycol 3350 17 Gram(s) Oral daily  senna 1 Tablet(s) Oral at bedtime  tamsulosin 0.4 milliGRAM(s) Oral at bedtime    MEDICATIONS  (PRN):  albuterol    90 MICROgram(s) HFA Inhaler 1 Puff(s) Inhalation every 6 hours PRN for shortness of breath and/or wheezing  ondansetron Injectable 4 milliGRAM(s) IV Push every 6 hours PRN Nausea and/or Vomiting  oxybutynin 5 milliGRAM(s) Oral every 8 hours PRN Bladder spasms  oxyCODONE    IR 5 milliGRAM(s) Oral every 4 hours PRN Moderate Pain (4 - 6)      Physical Exam:  Gen: NAD.  Lungs: Non labored breathing.   Ab: Soft, nontender, nondistended. Incisions CDI  : Glaser with clear urine on slow CBI    Labs:    04-22    141  |  108<H>  |  21  ----------------------------<  106<H>  3.9   |  25  |  1.05    Ca    7.8<L>      22 Apr 2023 05:26    TPro  3.6<L>  /  Alb  2.3<L>  /  TBili  0.8  /  DBili  x   /  AST  14  /  ALT  10  /  AlkPhos  45  04-20    LIVER FUNCTIONS - ( 20 Apr 2023 15:27 )  Alb: 2.3 g/dL / Pro: 3.6 g/dL / ALK PHOS: 45 U/L / ALT: 10 U/L / AST: 14 U/L / GGT: x                                 8.3    11.32 )-----------( 182      ( 22 Apr 2023 05:26 )             25.6     PT/INR - ( 20 Apr 2023 15:15 )   PT: 16.6 sec;   INR: 1.43 ratio         PTT - ( 20 Apr 2023 15:15 )  PTT:22.4 sec

## 2023-04-22 NOTE — PROGRESS NOTE ADULT - SUBJECTIVE AND OBJECTIVE BOX
PROGRESS NOTE:     Patient is a 67y old  Male who presents with a chief complaint of BPH (20 Apr 2023 20:47)      SUBJECTIVE / OVERNIGHT EVENTS: No acute events.     ADDITIONAL REVIEW OF SYSTEMS:    MEDICATIONS  (STANDING):  acetaminophen     Tablet .. 975 milliGRAM(s) Oral every 6 hours  aspirin enteric coated 81 milliGRAM(s) Oral daily  atorvastatin 40 milliGRAM(s) Oral at bedtime  ciprofloxacin   IVPB 400 milliGRAM(s) IV Intermittent every 12 hours  dextrose 5% + sodium chloride 0.45%. 1000 milliLiter(s) (75 mL/Hr) IV Continuous <Continuous>  finasteride 5 milliGRAM(s) Oral daily  heparin   Injectable 5000 Unit(s) SubCutaneous every 8 hours  lidocaine 5% Ointment 1 Application(s) Topical four times a day  losartan 100 milliGRAM(s) Oral daily  metoprolol succinate  milliGRAM(s) Oral daily  NIFEdipine XL 60 milliGRAM(s) Oral daily  pantoprazole    Tablet 40 milliGRAM(s) Oral before breakfast  polyethylene glycol 3350 17 Gram(s) Oral daily  senna 1 Tablet(s) Oral at bedtime  tamsulosin 0.4 milliGRAM(s) Oral at bedtime    MEDICATIONS  (PRN):  albuterol    90 MICROgram(s) HFA Inhaler 1 Puff(s) Inhalation every 6 hours PRN for shortness of breath and/or wheezing  ondansetron Injectable 4 milliGRAM(s) IV Push every 6 hours PRN Nausea and/or Vomiting  oxybutynin 5 milliGRAM(s) Oral every 8 hours PRN Bladder spasms  oxyCODONE    IR 5 milliGRAM(s) Oral every 4 hours PRN Moderate Pain (4 - 6)      CAPILLARY BLOOD GLUCOSE        I&O's Summary    21 Apr 2023 07:01  -  22 Apr 2023 07:00  --------------------------------------------------------  IN: 550 mL / OUT: 0 mL / NET: 550 mL    22 Apr 2023 07:01  -  22 Apr 2023 14:07  --------------------------------------------------------  IN: 250 mL / OUT: 0 mL / NET: 250 mL        PHYSICAL EXAM:  Vital Signs Last 24 Hrs  T(C): 36.6 (22 Apr 2023 13:21), Max: 37.1 (21 Apr 2023 17:35)  T(F): 97.8 (22 Apr 2023 13:21), Max: 98.8 (21 Apr 2023 17:35)  HR: 75 (22 Apr 2023 13:21) (75 - 90)  BP: 103/55 (22 Apr 2023 13:21) (103/55 - 133/67)  BP(mean): --  RR: 17 (22 Apr 2023 13:21) (16 - 18)  SpO2: 75% (22 Apr 2023 13:21) (75% - 100%)    Parameters below as of 22 Apr 2023 09:22  Patient On (Oxygen Delivery Method): room air        CONSTITUTIONAL: NAD, well-developed  RESPIRATORY: Normal respiratory effort; lungs are clear to auscultation bilaterally  CARDIOVASCULAR: Regular rate and rhythm, normal S1 and S2, no murmur/rub/gallop; No lower extremity edema; Peripheral pulses are 2+ bilaterally  ABDOMEN: Nontender to palpation, normoactive bowel sounds, no rebound/guarding; No hepatosplenomegaly  : Glaser w/ pink urine   MUSCLOSKELETAL: no clubbing or cyanosis of digits; no joint swelling or tenderness to palpation  PSYCH: A+O to person, place, and time; affect appropriate    LABS:                        8.3    11.32 )-----------( 182      ( 22 Apr 2023 05:26 )             25.6     04-22    141  |  108<H>  |  21  ----------------------------<  106<H>  3.9   |  25  |  1.05    Ca    7.8<L>      22 Apr 2023 05:26    TPro  3.6<L>  /  Alb  2.3<L>  /  TBili  0.8  /  DBili  x   /  AST  14  /  ALT  10  /  AlkPhos  45  04-20    PT/INR - ( 20 Apr 2023 15:15 )   PT: 16.6 sec;   INR: 1.43 ratio         PTT - ( 20 Apr 2023 15:15 )  PTT:22.4 sec            RADIOLOGY & ADDITIONAL TESTS:  Results Reviewed:   Imaging Personally Reviewed:  Electrocardiogram Personally Reviewed:    COORDINATION OF CARE:  Care Discussed with Consultants/Other Providers [Y/N]:  Prior or Outpatient Records Reviewed [Y/N]:

## 2023-04-22 NOTE — PROGRESS NOTE ADULT - ASSESSMENT
66 yo M s/p TURP 4/20/2023, in PACU pt had episodes of clot retention and required multiple manual irrigations.  Additionally, pt was experiencing  bladder spasms and required Ditropan and Valium.  Pt also had several episodes of hypotension and tachycardia which responded to LR bolus x 2.  Pt's H/H was 14/42 immediately post op and rechecked and was 11.3/34, and 10.3/30.7.  Urine color improved on CBI and pt remained hemodynamically stable after aforementioned interventions and was cleared to go to floor.    4/21: Pt offers no complaints, had BM this AM, CBI was clamped earlier this AM, punch now after BM, irrigated to clear no clots  4/22: Doing well, CBI was clamped, punch color, restarted on slow CBI, Hgb improved to 8.3 from 7.7 after 2 u    Plan:  -AM labs reviewed  -continue rios for now, monitor color, rios plan TBD  -Continue slow CBI  -IVM  -continue cipro, dc with cipro  -f/u medicine  -restart ASA 81  -DVT prophy, IS, OOB, ambulate

## 2023-04-23 ENCOUNTER — TRANSCRIPTION ENCOUNTER (OUTPATIENT)
Age: 68
End: 2023-04-23

## 2023-04-23 VITALS
RESPIRATION RATE: 17 BRPM | TEMPERATURE: 98 F | HEART RATE: 70 BPM | OXYGEN SATURATION: 98 % | SYSTOLIC BLOOD PRESSURE: 116 MMHG | DIASTOLIC BLOOD PRESSURE: 61 MMHG

## 2023-04-23 LAB
BLD GP AB SCN SERPL QL: NEGATIVE — SIGNIFICANT CHANGE UP
HCT VFR BLD CALC: 24.1 % — LOW (ref 39–50)
HGB BLD-MCNC: 7.9 G/DL — LOW (ref 13–17)
MCHC RBC-ENTMCNC: 30.4 PG — SIGNIFICANT CHANGE UP (ref 27–34)
MCHC RBC-ENTMCNC: 32.8 GM/DL — SIGNIFICANT CHANGE UP (ref 32–36)
MCV RBC AUTO: 92.7 FL — SIGNIFICANT CHANGE UP (ref 80–100)
NRBC # BLD: 0 /100 WBCS — SIGNIFICANT CHANGE UP (ref 0–0)
NRBC # FLD: 0 K/UL — SIGNIFICANT CHANGE UP (ref 0–0)
PLATELET # BLD AUTO: 176 K/UL — SIGNIFICANT CHANGE UP (ref 150–400)
RBC # BLD: 2.6 M/UL — LOW (ref 4.2–5.8)
RBC # FLD: 18 % — HIGH (ref 10.3–14.5)
RH IG SCN BLD-IMP: POSITIVE — SIGNIFICANT CHANGE UP
WBC # BLD: 7.72 K/UL — SIGNIFICANT CHANGE UP (ref 3.8–10.5)
WBC # FLD AUTO: 7.72 K/UL — SIGNIFICANT CHANGE UP (ref 3.8–10.5)

## 2023-04-23 PROCEDURE — 99232 SBSQ HOSP IP/OBS MODERATE 35: CPT

## 2023-04-23 RX ORDER — OXYBUTYNIN CHLORIDE 5 MG
1 TABLET ORAL
Qty: 6 | Refills: 0
Start: 2023-04-23 | End: 2023-04-24

## 2023-04-23 RX ORDER — ASPIRIN/CALCIUM CARB/MAGNESIUM 324 MG
1 TABLET ORAL
Qty: 30 | Refills: 11
Start: 2023-04-23

## 2023-04-23 RX ORDER — ACETAMINOPHEN 500 MG
3 TABLET ORAL
Qty: 0 | Refills: 0 | DISCHARGE
Start: 2023-04-23

## 2023-04-23 RX ORDER — POLYETHYLENE GLYCOL 3350 17 G/17G
17 POWDER, FOR SOLUTION ORAL
Qty: 0 | Refills: 0 | DISCHARGE
Start: 2023-04-23

## 2023-04-23 RX ORDER — SENNA PLUS 8.6 MG/1
1 TABLET ORAL
Qty: 0 | Refills: 0 | DISCHARGE
Start: 2023-04-23

## 2023-04-23 RX ORDER — CIPROFLOXACIN LACTATE 400MG/40ML
1 VIAL (ML) INTRAVENOUS
Qty: 8 | Refills: 0
Start: 2023-04-23 | End: 2023-04-26

## 2023-04-23 RX ADMIN — POLYETHYLENE GLYCOL 3350 17 GRAM(S): 17 POWDER, FOR SOLUTION ORAL at 12:21

## 2023-04-23 RX ADMIN — Medication 975 MILLIGRAM(S): at 06:28

## 2023-04-23 RX ADMIN — Medication 5 MILLIGRAM(S): at 05:35

## 2023-04-23 RX ADMIN — Medication 975 MILLIGRAM(S): at 05:28

## 2023-04-23 RX ADMIN — OXYCODONE HYDROCHLORIDE 5 MILLIGRAM(S): 5 TABLET ORAL at 08:41

## 2023-04-23 RX ADMIN — Medication 975 MILLIGRAM(S): at 00:45

## 2023-04-23 RX ADMIN — Medication 975 MILLIGRAM(S): at 13:21

## 2023-04-23 RX ADMIN — FINASTERIDE 5 MILLIGRAM(S): 5 TABLET, FILM COATED ORAL at 12:22

## 2023-04-23 RX ADMIN — Medication 200 MILLIGRAM(S): at 05:29

## 2023-04-23 RX ADMIN — LIDOCAINE 1 APPLICATION(S): 4 CREAM TOPICAL at 05:36

## 2023-04-23 RX ADMIN — Medication 975 MILLIGRAM(S): at 12:21

## 2023-04-23 RX ADMIN — PANTOPRAZOLE SODIUM 40 MILLIGRAM(S): 20 TABLET, DELAYED RELEASE ORAL at 05:28

## 2023-04-23 RX ADMIN — OXYCODONE HYDROCHLORIDE 5 MILLIGRAM(S): 5 TABLET ORAL at 07:58

## 2023-04-23 RX ADMIN — HEPARIN SODIUM 5000 UNIT(S): 5000 INJECTION INTRAVENOUS; SUBCUTANEOUS at 05:28

## 2023-04-23 RX ADMIN — Medication 81 MILLIGRAM(S): at 12:22

## 2023-04-23 NOTE — PROGRESS NOTE ADULT - PROBLEM SELECTOR PLAN 4
Continue atorvastatin. Recommend outpatient fasting lipid panel.
Continue atorvastatin. Recommend outpatient fasting lipid panel.

## 2023-04-23 NOTE — DISCHARGE NOTE PROVIDER - NSDCMRMEDTOKEN_GEN_ALL_CORE_FT
acetaminophen 325 mg oral tablet: 3 tab(s) orally every 6 hours  Albuterol (Eqv-ProAir HFA) 90 mcg/inh inhalation aerosol: 1 inhaled as needed for  shortness of breath and/or wheezing  aspirin 81 mg oral delayed release tablet: 1 tab(s) orally once a day MDD: 1  atorvastatin 40 mg oral tablet: 1 orally once a day  ciprofloxacin 500 mg oral tablet: 1 tab(s) orally every 12 hours  finasteride 5 mg oral tablet: 1 orally once a day  losartan 100 mg oral tablet: 1 orally once a day  metoprolol succinate 100 mg oral capsule, extended release: 1 orally once a day  NIFEdipine (Eqv-Adalat CC) 60 mg oral tablet, extended release: 1 orally once a day  omeprazole 20 mg oral delayed release capsule: 1 orally once a day  oxybutynin 5 mg oral tablet: 1 tab(s) orally every 8 hours as needed for  bladder spasms  polyethylene glycol 3350 oral powder for reconstitution: 17 gram(s) orally once a day  senna leaf extract oral tablet: 1 tab(s) orally once a day (at bedtime)  tamsulosin 0.4 mg oral capsule: orally once a day

## 2023-04-23 NOTE — DISCHARGE NOTE NURSING/CASE MANAGEMENT/SOCIAL WORK - NSDCPNINST_GEN_ALL_CORE
Call MD for follow up appointment. Glaser care as instructed. Drink plenty of fluids. Notify MD for any signs/symptoms of infection; ie: fever greater than 100.4. Notify MD if there is no urine in the bag or  bright red blood. Take over the counter stool softeners as needed for constipation.

## 2023-04-23 NOTE — PROGRESS NOTE ADULT - PROBLEM SELECTOR PLAN 5
Pt reports 1 coronary stent ~12 years ago. Maintained on asa 81mg daily for many years and last stress test ~4-6 months ago was reportedly normal. No cardiac symptoms or signs at this time.  Continue Aspirin, Metoprolol, statin
Pt reports 1 coronary stent ~12 years ago. Maintained on asa 81mg daily for many years and last stress test ~4-6 months ago was reportedly normal. No cardiac symptoms or signs at this time.  Continue Aspirin, Metoprolol, statin

## 2023-04-23 NOTE — DISCHARGE NOTE PROVIDER - HOSPITAL COURSE
68 yo M s/p TURP 4/20/2023, in PACU pt had episodes of clot retention and required multiple manual irrigations.  Additionally, pt was experiencing  bladder spasms and required Ditropan and Valium.  Pt also had several episodes of hypotension and tachycardia which responded to LR bolus x 2.  Pt's H/H was 14/42 immediately post op and rechecked and was 11.3/34, and 10.3/30.7.  Urine color improved on CBI and pt remained hemodynamically stable after aforementioned interventions and was cleared to go to floor.    4/21: Pt offers no complaints, had BM this AM, CBI was clamped earlier this AM, punch now after BM, irrigated to clear no clots  4/22: Doing well, CBI was clamped, punch color, restarted on slow CBI, Hgb improved to 8.3 from 7.7 after 2 u  4/23: Patient with inappropriate response to 2u PRBC, AM labs demonstrated Hemoglobin of 7.9 from 8.3, color with CBI clamped was light pink/translucent cherry with return of minimal clot. At this time the patient's urine color is a translucent cherry with no clots. Patient was instructed to self irrigate the catheter if the color changes.

## 2023-04-23 NOTE — PROGRESS NOTE ADULT - SUBJECTIVE AND OBJECTIVE BOX
PROGRESS NOTE:     Patient is a 67y old  Male who presents with a chief complaint of BPH (20 Apr 2023 20:47)      SUBJECTIVE / OVERNIGHT EVENTS: No new complaints.     ADDITIONAL REVIEW OF SYSTEMS:    MEDICATIONS  (STANDING):  acetaminophen     Tablet .. 975 milliGRAM(s) Oral every 6 hours  aspirin enteric coated 81 milliGRAM(s) Oral daily  atorvastatin 40 milliGRAM(s) Oral at bedtime  ciprofloxacin   IVPB 400 milliGRAM(s) IV Intermittent every 12 hours  finasteride 5 milliGRAM(s) Oral daily  heparin   Injectable 5000 Unit(s) SubCutaneous every 8 hours  lidocaine 5% Ointment 1 Application(s) Topical four times a day  losartan 100 milliGRAM(s) Oral daily  metoprolol succinate  milliGRAM(s) Oral daily  NIFEdipine XL 60 milliGRAM(s) Oral daily  pantoprazole    Tablet 40 milliGRAM(s) Oral before breakfast  polyethylene glycol 3350 17 Gram(s) Oral daily  senna 1 Tablet(s) Oral at bedtime  tamsulosin 0.4 milliGRAM(s) Oral at bedtime    MEDICATIONS  (PRN):  albuterol    90 MICROgram(s) HFA Inhaler 1 Puff(s) Inhalation every 6 hours PRN for shortness of breath and/or wheezing  ondansetron Injectable 4 milliGRAM(s) IV Push every 6 hours PRN Nausea and/or Vomiting  oxybutynin 5 milliGRAM(s) Oral every 8 hours PRN Bladder spasms  oxyCODONE    IR 5 milliGRAM(s) Oral every 4 hours PRN Moderate Pain (4 - 6)      CAPILLARY BLOOD GLUCOSE        I&O's Summary    22 Apr 2023 07:01  -  23 Apr 2023 07:00  --------------------------------------------------------  IN: 800 mL / OUT: 0 mL / NET: 800 mL    23 Apr 2023 07:01  -  23 Apr 2023 11:32  --------------------------------------------------------  IN: 0 mL / OUT: 800 mL / NET: -800 mL        PHYSICAL EXAM:  Vital Signs Last 24 Hrs  T(C): 36.8 (23 Apr 2023 09:45), Max: 36.8 (22 Apr 2023 18:06)  T(F): 98.2 (23 Apr 2023 09:45), Max: 98.3 (22 Apr 2023 21:41)  HR: 90 (23 Apr 2023 09:45) (65 - 90)  BP: 117/65 (23 Apr 2023 09:45) (103/55 - 123/66)  BP(mean): --  RR: 17 (23 Apr 2023 09:45) (17 - 18)  SpO2: 97% (23 Apr 2023 09:45) (97% - 100%)    Parameters below as of 23 Apr 2023 09:45  Patient On (Oxygen Delivery Method): room air        CONSTITUTIONAL: NAD, well-developed  RESPIRATORY: Normal respiratory effort; lungs are clear to auscultation bilaterally  CARDIOVASCULAR: Regular rate and rhythm, normal S1 and S2, no murmur/rub/gallop; No lower extremity edema; Peripheral pulses are 2+ bilaterally  ABDOMEN: Nontender to palpation, normoactive bowel sounds, no rebound/guarding; No hepatosplenomegaly  : Glaser w/ pink urine   MUSCLOSKELETAL: no clubbing or cyanosis of digits; no joint swelling or tenderness to palpation  PSYCH: A+O to person, place, and time; affect appropriate    LABS:                        7.9    7.72  )-----------( 176      ( 23 Apr 2023 05:19 )             24.1     04-22    141  |  108<H>  |  21  ----------------------------<  106<H>  3.9   |  25  |  1.05    Ca    7.8<L>      22 Apr 2023 05:26                  RADIOLOGY & ADDITIONAL TESTS:  Results Reviewed:   Imaging Personally Reviewed:  Electrocardiogram Personally Reviewed:    COORDINATION OF CARE:  Care Discussed with Consultants/Other Providers [Y/N]:  Prior or Outpatient Records Reviewed [Y/N]:

## 2023-04-23 NOTE — PROGRESS NOTE ADULT - PROBLEM SELECTOR PLAN 2
Acute blood loss anemia d/t surgery and hematuria   s/p 2 units PRBC 4/21   Monitor CBC and transfuse to keep Hgb above 7
Acute blood loss anemia d/t surgery and hematuria   Monitor CBC and transfuse to keep Hgb above 7

## 2023-04-23 NOTE — DISCHARGE NOTE PROVIDER - NSDCCPCAREPLAN_GEN_ALL_CORE_FT
PRINCIPAL DISCHARGE DIAGNOSIS  Diagnosis: S/P TURP  Assessment and Plan of Treatment: Discharge Instructions: TURP/TURBT  · Catheter: The nurses will review instructions and care before you go home.   · General: It is common to have blood in the urine after your procedure. It may be pink or even red; inform your doctor if you have a significant amount of clot in the urine or if you are unable to void at all or if your catheter stops draining. It is not uncommon to have some burning when you urinate, this will gradually improve. With a catheter in place, it is not uncommon to have occasional leakage of urine or blood around the catheter. Please call your urologist if this is excessive and/or the urine is not draining through the catheter into the bag.  · Bathing: You may shower or bathe. If going home with Glaser, shower only until catheter is removed.  · Diet: You may resume your regular diet and regular medication regimen.  · Pain: You may take Tylenol (acetaminophen) 650-975mg and/or Motrin (ibuprofen) 400-600mg, available over the counter, for pain every 6 hours as needed. Do not exceed 4000 milligrams of Tylenol (acetaminophen) daily. You may alternate these medications such that you take either one every 3 hours.  · Antibiotics: You have been given a prescription for an antibiotic, please take this medication as instructed and be sure to complete entire course.  · Stool softeners: Do not allow yourself to become constipated as straining will cause bleeding. Take stool softeners (ex. Colace) or a laxative (ex. Senekot, ExLax), available over the counter, if needed.  · Activity: No heavy lifting or strenuous exercise until you are evaluated at your post-operative appointment. Otherwise, you may return to your usual level of activity.

## 2023-04-23 NOTE — DISCHARGE NOTE NURSING/CASE MANAGEMENT/SOCIAL WORK - PATIENT PORTAL LINK FT
You can access the FollowMyHealth Patient Portal offered by [facility] by registering at the following website: [email or web address]. By joining [facility]’s FollowMyHealth portal, you will also be able to view your health information using other applications (apps) compatible with our system.

## 2023-04-23 NOTE — PROGRESS NOTE ADULT - ASSESSMENT
66 yo M s/p TURP 4/20/2023, in PACU pt had episodes of clot retention and required multiple manual irrigations.  Additionally, pt was experiencing  bladder spasms and required Ditropan and Valium.  Pt also had several episodes of hypotension and tachycardia which responded to LR bolus x 2.  Pt's H/H was 14/42 immediately post op and rechecked and was 11.3/34, and 10.3/30.7.  Urine color improved on CBI and pt remained hemodynamically stable after aforementioned interventions and was cleared to go to floor.    4/21: Pt offers no complaints, had BM this AM, CBI was clamped earlier this AM, punch now after BM, irrigated to clear no clots  4/22: Doing well, CBI was clamped, punch color, restarted on slow CBI, Hgb improved to 8.3 from 7.7 after 2 u  4/23: Patient with inapproraite response to 2u PRBC, AM labs demonstrated H/H of , color with CBI clamped was     Plan:  -AM labs reviewed  -continue rios for now, monitor color, rios plan TBD  -Continue slow CBI  -IVM  -continue cipro, dc with cipro  -f/u medicine  -restart ASA 81  -DVT prophy, IS, OOB, ambulate   68 yo M s/p TURP 4/20/2023, in PACU pt had episodes of clot retention and required multiple manual irrigations.  Additionally, pt was experiencing  bladder spasms and required Ditropan and Valium.  Pt also had several episodes of hypotension and tachycardia which responded to LR bolus x 2.  Pt's H/H was 14/42 immediately post op and rechecked and was 11.3/34, and 10.3/30.7.  Urine color improved on CBI and pt remained hemodynamically stable after aforementioned interventions and was cleared to go to floor.    4/21: Pt offers no complaints, had BM this AM, CBI was clamped earlier this AM, punch now after BM, irrigated to clear no clots  4/22: Doing well, CBI was clamped, punch color, restarted on slow CBI, Hgb improved to 8.3 from 7.7 after 2 u  4/23: Patient with inappropriate response to 2u PRBC, AM labs demonstrated Hemoglobin of 7.9 from 8.3, color with CBI clamped was light pink/translucent cherry with return of minimal clot.     Plan:  -AM labs reviewed  -continue rios for now, monitor color, f.u color in PM if color remains stable patient to be discharged home with TOV in office  -IVL  -continue cipro, dc with cipro x4 days  -f/u medicine  -restart ASA 81  -DVT prophy, IS, OOB, ambulate

## 2023-04-23 NOTE — DISCHARGE NOTE PROVIDER - CARE PROVIDER_API CALL
Sam Moon  Urology  [address] [place] [address]  Phone: [phone]  Fax: [phone]  Follow Up Time: 1-3 days

## 2023-04-23 NOTE — PROGRESS NOTE ADULT - PROBLEM SELECTOR PLAN 1
Now s/p TURP  Plan to clamp CBI and monitor urine color as per    Continue on tamsulosin and Proscar Now s/p TURP  Plan to clamp CBI and monitor urine color as per    Continue on tamsulosin and Proscar  Continue Cipro as per primary team

## 2023-04-23 NOTE — PROGRESS NOTE ADULT - PROBLEM SELECTOR PLAN 3
Continue losartan, metoprolol, nifedipine  Monitor BP
Continue losartan, metoprolol, nifedipine  Monitor BP

## 2023-04-23 NOTE — DISCHARGE NOTE PROVIDER - NSDCFUADDAPPT_GEN_ALL_CORE_FT
· Anticoagulation: If you are taking any blood thinning medications, please discuss with your urologist prior to restarting these medications unless otherwise specified.  · Follow-up: If you did not already schedule your post-operative appointment, please call your urologist to schedule a follow-up appointment.  · Call your urologist if: You have any bleeding that does not stop, inability to void >8 hours, fever over 100.4 F, chills, persistent nausea/vomiting, or if your pain is not controlled on your discharge pain medications.

## 2023-04-23 NOTE — PROGRESS NOTE ADULT - SUBJECTIVE AND OBJECTIVE BOX
Subjective  Patient CBI clamped overnight, color was light pink. Urine color with CBI clamped was . Denies fevers, chills, nausea, vomiting, SOB, CP. Tolerating diet.    Objective    Vital signs  T(F): , Max: 98.3 (04-22-23 @ 21:41)  HR: 68 (04-23-23 @ 05:32)  BP: 114/60 (04-23-23 @ 05:32)  SpO2: 100% (04-23-23 @ 05:32)  Wt(kg): --    Output         Gen: NAD  Abd: soft, nontender, nondistended  : rios secured in place, draining    Labs      04-23 @ 05:19    WBC 7.72  / Hct 24.1  / SCr --       04-22 @ 05:26    WBC 11.32 / Hct 25.6  / SCr 1.05           Urine Cx: ?  Blood Cx: ?    Imaging Subjective  Patient CBI clamped overnight, color was light pink. Urine color with CBI clamped was light pink/translucent cherry. Denies fevers, chills, nausea, vomiting, SOB, CP. Tolerating diet. Catheter was irrigated at the bedside with return of 10cc of clot and then drainage of light pink urine.    Objective    Vital signs  T(F): , Max: 98.3 (04-22-23 @ 21:41)  HR: 68 (04-23-23 @ 05:32)  BP: 114/60 (04-23-23 @ 05:32)  SpO2: 100% (04-23-23 @ 05:32)  Wt(kg): --    Output         Gen: NAD  Abd: soft, nontender, nondistended  : rios secured in place, draining light pink urine    Labs      04-23 @ 05:19    WBC 7.72  / Hct 24.1  / SCr --       04-22 @ 05:26    WBC 11.32 / Hct 25.6  / SCr 1.05           Urine Cx: ?  Blood Cx: ?    Imaging

## 2023-04-25 ENCOUNTER — OUTPATIENT (OUTPATIENT)
Dept: OUTPATIENT SERVICES | Facility: HOSPITAL | Age: 68
LOS: 1 days | End: 2023-04-25
Payer: MEDICARE

## 2023-04-25 ENCOUNTER — APPOINTMENT (OUTPATIENT)
Dept: UROLOGY | Facility: CLINIC | Age: 68
End: 2023-04-25
Payer: MEDICARE

## 2023-04-25 VITALS
OXYGEN SATURATION: 97 % | TEMPERATURE: 98.1 F | SYSTOLIC BLOOD PRESSURE: 149 MMHG | DIASTOLIC BLOOD PRESSURE: 78 MMHG | RESPIRATION RATE: 17 BRPM | HEART RATE: 110 BPM

## 2023-04-25 DIAGNOSIS — N20.0 CALCULUS OF KIDNEY: Chronic | ICD-10-CM

## 2023-04-25 DIAGNOSIS — R35.0 FREQUENCY OF MICTURITION: ICD-10-CM

## 2023-04-25 PROCEDURE — 51798 US URINE CAPACITY MEASURE: CPT

## 2023-04-25 PROCEDURE — 51700 IRRIGATION OF BLADDER: CPT | Mod: 58

## 2023-04-25 PROCEDURE — 51700 IRRIGATION OF BLADDER: CPT

## 2023-04-27 DIAGNOSIS — N40.1 BENIGN PROSTATIC HYPERPLASIA WITH LOWER URINARY TRACT SYMPTOMS: ICD-10-CM

## 2023-05-02 LAB — SURGICAL PATHOLOGY STUDY: SIGNIFICANT CHANGE UP

## 2023-05-17 ENCOUNTER — OUTPATIENT (OUTPATIENT)
Dept: OUTPATIENT SERVICES | Facility: HOSPITAL | Age: 68
LOS: 1 days | Discharge: HOME | End: 2023-05-17
Payer: MEDICARE

## 2023-05-17 DIAGNOSIS — D64.9 ANEMIA, UNSPECIFIED: ICD-10-CM

## 2023-05-17 DIAGNOSIS — N20.0 CALCULUS OF KIDNEY: Chronic | ICD-10-CM

## 2023-05-18 RX ORDER — NIFEDIPINE 60 MG/1
60 TABLET, FILM COATED, EXTENDED RELEASE ORAL
Refills: 0 | Status: DISCONTINUED | COMMUNITY
End: 2023-05-18

## 2023-05-18 RX ORDER — FLUNISOLIDE 80 UG/1
AEROSOL, METERED RESPIRATORY (INHALATION)
Refills: 0 | Status: DISCONTINUED | COMMUNITY
End: 2023-05-18

## 2023-05-18 RX ORDER — SODIUM SULFATE, POTASSIUM SULFATE, MAGNESIUM SULFATE 17.5; 3.13; 1.6 G/ML; G/ML; G/ML
17.5-3.13-1.6 SOLUTION, CONCENTRATE ORAL
Qty: 1 | Refills: 0 | Status: DISCONTINUED | COMMUNITY
Start: 2021-06-25 | End: 2023-05-18

## 2023-05-18 RX ORDER — FINASTERIDE 5 MG/1
5 TABLET, FILM COATED ORAL DAILY
Refills: 0 | Status: DISCONTINUED | COMMUNITY
End: 2023-05-18

## 2023-05-19 ENCOUNTER — APPOINTMENT (OUTPATIENT)
Dept: CARDIOLOGY | Facility: CLINIC | Age: 68
End: 2023-05-19
Payer: MEDICARE

## 2023-05-19 ENCOUNTER — NON-APPOINTMENT (OUTPATIENT)
Age: 68
End: 2023-05-19

## 2023-05-19 VITALS
OXYGEN SATURATION: 99 % | RESPIRATION RATE: 17 BRPM | WEIGHT: 169.75 LBS | BODY MASS INDEX: 24.3 KG/M2 | SYSTOLIC BLOOD PRESSURE: 173 MMHG | DIASTOLIC BLOOD PRESSURE: 78 MMHG | HEIGHT: 70 IN | HEART RATE: 73 BPM | TEMPERATURE: 97.9 F

## 2023-05-19 VITALS — SYSTOLIC BLOOD PRESSURE: 150 MMHG | DIASTOLIC BLOOD PRESSURE: 68 MMHG

## 2023-05-19 PROCEDURE — 99205 OFFICE O/P NEW HI 60 MIN: CPT

## 2023-05-19 PROCEDURE — 93000 ELECTROCARDIOGRAM COMPLETE: CPT | Mod: 59,PD

## 2023-05-19 PROCEDURE — 93010 ELECTROCARDIOGRAM REPORT: CPT

## 2023-05-19 PROCEDURE — 93246 EXT ECG>7D<15D RECORDING: CPT

## 2023-05-19 NOTE — HISTORY OF PRESENT ILLNESS
[FreeTextEntry1] : Self referred because of history of heart disease.\par \par In 2012 he had chest pain while on vacation while canoeing and when he returned to [place] a cardiac catheterization was performed and a stent was deployed at [facility].\par \par No subsequent PCI. Had stress test a few years ago, but does not remember when exactly. \par \par 4/20/2023 he underwent TURP for prostatic hypertrophy with lower urinary tract symptoms, post op, he was hospitalized for hematuria requiring transfusion.\par \par He was discharged home on 4/23. After resuming antihypertensive medications, his BP was low and he felt lightheaded. Since then, he has only taken metoprolol, atorvastatin, and aspirin.\par \par Experiences episodes of irregular racing heart beat, most often this occurs in the AM. Worse since stopping his medications, and somewhat better after he takes the metoprolol.\par \par He is also experiencing abdominal pain, which has worsened since the TURP. Had f/u appointment with Dr. Moon next week.\par \par No hx, DM, no tobacco smoking. 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 He was treated with sertraline previously, but stopped it. He worries a lot about his health and other issues.\par \par No ETOH. No tobacco.\par \par Cardiovascular Summary:\par ---------------------------------------------------\par ECG:\par 5/19/2023: NSR, RBBB, LAD, 73 bpm\par 4/1/2015: sinus 57 RBBB\par ---------------------------------------------------\par Echo:\par ---------------------------------------------------\par Cath:\par 3/2/2012: mLAD Xience 3.5 x 15 mm ([facility])\par ---------------------------------------------------\par CT/MRI\par 10/28/2022: CT abdomen/pelvis - atherosclerotic calcifications\par ----------------------------------------------\par Remote/ambulatory rhythm monitoring:

## 2023-05-19 NOTE — REASON FOR VISIT
[Spouse] : spouse [FreeTextEntry1] : SARAH SONG is a 67 year old man with a history of W[employer] exposure, HTN, CAD s/p PCI, GERD, asthma and BPH s/p TURP on 4/20/2023 who presents for evaluation of palpitations and hypertension.\par

## 2023-05-19 NOTE — ASSESSMENT
[FreeTextEntry1] : 67 year old man with:\par \par 1. CAD s/p PCI in 2012 to mLAD\par 2. Abnormal ECG with RBBB, LAD, LVH\par 3. Palpitations\par 4. Hypertension, uncontrolled in setting of being off medications\par 5. Recent hypotension in setting of post-operative UTI and blood loss anemia\par 6. Anxiety\par 7. [employer] exposure - followed in [facility] for GERD and asthma\par \par \par Will perform 14 day Zio patch monitor for palpitations and risk factors for arrhythmia\par Labs today, pro-BNP, TSH, CMP, lipids, A1c\par Transthoracic echo with GLS given abnormal ECG and symptoms\par Continue atorvastatin 40, metoprolol 50 ER, and aspirin for history of CAD, possible stress test pending above\par \par Follow up with me after above\par \par Above reviewed with the patient and his wife and all questions were answered to the best of my ability and to their apparent satisfaction.

## 2023-05-19 NOTE — REVIEW OF SYSTEMS
[Feeling Fatigued] : feeling fatigued [Palpitations] : palpitations [Abdominal Pain] : abdominal pain [Heartburn] : heartburn [Hematuria] : hematuria [Pain During Urination] : dysuria [Erectile Dysfunction] : erectile dysfunction [Dizziness] : dizziness [Anxiety] : anxiety [Negative] : Heme/Lymph

## 2023-05-21 ENCOUNTER — INPATIENT (INPATIENT)
Facility: HOSPITAL | Age: 68
LOS: 0 days | Discharge: HOME | End: 2023-05-22
Attending: HOSPITALIST | Admitting: HOSPITALIST
Payer: MEDICARE

## 2023-05-21 VITALS
HEIGHT: 68.5 IN | SYSTOLIC BLOOD PRESSURE: 118 MMHG | OXYGEN SATURATION: 100 % | DIASTOLIC BLOOD PRESSURE: 57 MMHG | TEMPERATURE: 99 F | RESPIRATION RATE: 16 BRPM | HEART RATE: 104 BPM

## 2023-05-21 DIAGNOSIS — N20.0 CALCULUS OF KIDNEY: Chronic | ICD-10-CM

## 2023-05-21 LAB
ALBUMIN SERPL ELPH-MCNC: 4.5 G/DL — SIGNIFICANT CHANGE UP (ref 3.3–5)
ALBUMIN SERPL ELPH-MCNC: 4.6 G/DL
ALP BLD-CCNC: 80 U/L
ALP SERPL-CCNC: 71 U/L — SIGNIFICANT CHANGE UP (ref 40–120)
ALT FLD-CCNC: 11 U/L — SIGNIFICANT CHANGE UP (ref 4–41)
ALT SERPL-CCNC: 12 U/L
ANION GAP SERPL CALC-SCNC: 14 MMOL/L
ANION GAP SERPL CALC-SCNC: 15 MMOL/L — HIGH (ref 7–14)
APTT BLD: 23.9 SEC — LOW (ref 27–36.3)
AST SERPL-CCNC: 15 U/L
AST SERPL-CCNC: 19 U/L — SIGNIFICANT CHANGE UP (ref 4–40)
BASE EXCESS BLDV CALC-SCNC: 0.7 MMOL/L — SIGNIFICANT CHANGE UP (ref -2–3)
BASOPHILS # BLD AUTO: 0.03 K/UL — SIGNIFICANT CHANGE UP (ref 0–0.2)
BASOPHILS NFR BLD AUTO: 0.3 % — SIGNIFICANT CHANGE UP (ref 0–2)
BILIRUB SERPL-MCNC: 0.6 MG/DL
BILIRUB SERPL-MCNC: 0.7 MG/DL — SIGNIFICANT CHANGE UP (ref 0.2–1.2)
BLOOD GAS VENOUS COMPREHENSIVE RESULT: SIGNIFICANT CHANGE UP
BUN SERPL-MCNC: 15 MG/DL
BUN SERPL-MCNC: 21 MG/DL — SIGNIFICANT CHANGE UP (ref 7–23)
CALCIUM SERPL-MCNC: 9.2 MG/DL — SIGNIFICANT CHANGE UP (ref 8.4–10.5)
CALCIUM SERPL-MCNC: 9.3 MG/DL
CHLORIDE BLDV-SCNC: 100 MMOL/L — SIGNIFICANT CHANGE UP (ref 96–108)
CHLORIDE SERPL-SCNC: 105 MMOL/L
CHLORIDE SERPL-SCNC: 98 MMOL/L — SIGNIFICANT CHANGE UP (ref 98–107)
CHOLEST SERPL-MCNC: 139 MG/DL
CO2 BLDV-SCNC: 24.8 MMOL/L — SIGNIFICANT CHANGE UP (ref 22–26)
CO2 SERPL-SCNC: 21 MMOL/L — LOW (ref 22–31)
CO2 SERPL-SCNC: 24 MMOL/L
CREAT SERPL-MCNC: 1 MG/DL
CREAT SERPL-MCNC: 1.14 MG/DL — SIGNIFICANT CHANGE UP (ref 0.5–1.3)
EGFR: 70 ML/MIN/1.73M2 — SIGNIFICANT CHANGE UP
EGFR: 82 ML/MIN/1.73M2
EOSINOPHIL # BLD AUTO: 0 K/UL — SIGNIFICANT CHANGE UP (ref 0–0.5)
EOSINOPHIL NFR BLD AUTO: 0 % — SIGNIFICANT CHANGE UP (ref 0–6)
ESTIMATED AVERAGE GLUCOSE: 111 MG/DL
GAS PNL BLDV: 132 MMOL/L — LOW (ref 136–145)
GLUCOSE BLDV-MCNC: 134 MG/DL — HIGH (ref 70–99)
GLUCOSE SERPL-MCNC: 118 MG/DL
GLUCOSE SERPL-MCNC: 126 MG/DL — HIGH (ref 70–99)
HBA1C MFR BLD HPLC: 5.5 %
HCO3 BLDV-SCNC: 24 MMOL/L — SIGNIFICANT CHANGE UP (ref 22–29)
HCT VFR BLD CALC: 30.7 % — LOW (ref 39–50)
HCT VFR BLDA CALC: 30 % — LOW (ref 39–51)
HDLC SERPL-MCNC: 72 MG/DL
HGB BLD CALC-MCNC: 10.1 G/DL — LOW (ref 12.6–17.4)
HGB BLD-MCNC: 9.5 G/DL — LOW (ref 13–17)
IANC: 8.35 K/UL — HIGH (ref 1.8–7.4)
IMM GRANULOCYTES NFR BLD AUTO: 0.5 % — SIGNIFICANT CHANGE UP (ref 0–0.9)
INR BLD: 1.11 RATIO — SIGNIFICANT CHANGE UP (ref 0.88–1.16)
LACTATE BLDV-MCNC: 2.4 MMOL/L — HIGH (ref 0.5–2)
LDLC SERPL CALC-MCNC: 48 MG/DL
LIDOCAIN IGE QN: 38 U/L — SIGNIFICANT CHANGE UP (ref 7–60)
LYMPHOCYTES # BLD AUTO: 0.76 K/UL — LOW (ref 1–3.3)
LYMPHOCYTES # BLD AUTO: 7.6 % — LOW (ref 13–44)
MAGNESIUM SERPL-MCNC: 2 MG/DL — SIGNIFICANT CHANGE UP (ref 1.6–2.6)
MCHC RBC-ENTMCNC: 25.4 PG — LOW (ref 27–34)
MCHC RBC-ENTMCNC: 30.9 GM/DL — LOW (ref 32–36)
MCV RBC AUTO: 82.1 FL — SIGNIFICANT CHANGE UP (ref 80–100)
MONOCYTES # BLD AUTO: 0.82 K/UL — SIGNIFICANT CHANGE UP (ref 0–0.9)
MONOCYTES NFR BLD AUTO: 8.2 % — SIGNIFICANT CHANGE UP (ref 2–14)
NEUTROPHILS # BLD AUTO: 8.35 K/UL — HIGH (ref 1.8–7.4)
NEUTROPHILS NFR BLD AUTO: 83.4 % — HIGH (ref 43–77)
NONHDLC SERPL-MCNC: 66 MG/DL
NRBC # BLD: 0 /100 WBCS — SIGNIFICANT CHANGE UP (ref 0–0)
NRBC # FLD: 0 K/UL — SIGNIFICANT CHANGE UP (ref 0–0)
NT-PROBNP SERPL-MCNC: 161 PG/ML
NT-PROBNP SERPL-SCNC: 154 PG/ML — SIGNIFICANT CHANGE UP
PCO2 BLDV: 32 MMHG — LOW (ref 42–55)
PH BLDV: 7.48 — HIGH (ref 7.32–7.43)
PLATELET # BLD AUTO: 270 K/UL — SIGNIFICANT CHANGE UP (ref 150–400)
PO2 BLDV: 48 MMHG — HIGH (ref 25–45)
POTASSIUM BLDV-SCNC: 3.5 MMOL/L — SIGNIFICANT CHANGE UP (ref 3.5–5.1)
POTASSIUM SERPL-MCNC: 3.6 MMOL/L — SIGNIFICANT CHANGE UP (ref 3.5–5.3)
POTASSIUM SERPL-SCNC: 3.6 MMOL/L — SIGNIFICANT CHANGE UP (ref 3.5–5.3)
POTASSIUM SERPL-SCNC: 4.5 MMOL/L
PROT SERPL-MCNC: 6.6 G/DL — SIGNIFICANT CHANGE UP (ref 6–8.3)
PROT SERPL-MCNC: 6.8 G/DL
PROTHROM AB SERPL-ACNC: 12.9 SEC — SIGNIFICANT CHANGE UP (ref 10.5–13.4)
RBC # BLD: 3.74 M/UL — LOW (ref 4.2–5.8)
RBC # FLD: 15.6 % — HIGH (ref 10.3–14.5)
SAO2 % BLDV: 83.3 % — SIGNIFICANT CHANGE UP (ref 67–88)
SODIUM SERPL-SCNC: 134 MMOL/L — LOW (ref 135–145)
SODIUM SERPL-SCNC: 143 MMOL/L
TRIGL SERPL-MCNC: 90 MG/DL
TROPONIN T, HIGH SENSITIVITY RESULT: 46 NG/L — SIGNIFICANT CHANGE UP
TSH SERPL-ACNC: 1.27 UIU/ML
WBC # BLD: 10.01 K/UL — SIGNIFICANT CHANGE UP (ref 3.8–10.5)
WBC # FLD AUTO: 10.01 K/UL — SIGNIFICANT CHANGE UP (ref 3.8–10.5)

## 2023-05-21 PROCEDURE — 99285 EMERGENCY DEPT VISIT HI MDM: CPT

## 2023-05-21 PROCEDURE — 76705 ECHO EXAM OF ABDOMEN: CPT | Mod: 26

## 2023-05-21 PROCEDURE — 71046 X-RAY EXAM CHEST 2 VIEWS: CPT | Mod: 26

## 2023-05-21 RX ORDER — ONDANSETRON 8 MG/1
4 TABLET, FILM COATED ORAL ONCE
Refills: 0 | Status: COMPLETED | OUTPATIENT
Start: 2023-05-21 | End: 2023-05-21

## 2023-05-21 RX ORDER — FAMOTIDINE 10 MG/ML
20 INJECTION INTRAVENOUS ONCE
Refills: 0 | Status: COMPLETED | OUTPATIENT
Start: 2023-05-21 | End: 2023-05-21

## 2023-05-21 RX ORDER — ACETAMINOPHEN 500 MG
1000 TABLET ORAL ONCE
Refills: 0 | Status: COMPLETED | OUTPATIENT
Start: 2023-05-21 | End: 2023-05-21

## 2023-05-21 RX ADMIN — FAMOTIDINE 20 MILLIGRAM(S): 10 INJECTION INTRAVENOUS at 19:59

## 2023-05-21 RX ADMIN — Medication 30 MILLILITER(S): at 19:57

## 2023-05-21 RX ADMIN — Medication 400 MILLIGRAM(S): at 22:51

## 2023-05-21 RX ADMIN — ONDANSETRON 4 MILLIGRAM(S): 8 TABLET, FILM COATED ORAL at 19:58

## 2023-05-21 NOTE — ED ADULT NURSE NOTE - OBJECTIVE STATEMENT
A&Ox4. ambulatory. c/o Midline ABD pain after eating, intermittent dizziness, intermittent N/V. pt states this has been happening for "awhile." PT also states he had loose stool today with a "yellow color to it." NAD. pt denies SOB, chest pain, dizziness, weakness, urinary symptoms, HA, fevers, chills. respirations are even and un labored. ABD is soft and positive tenderness to midline ABD quads. skin intact. awaiting provider orders. call bell at bedside.

## 2023-05-21 NOTE — ED PROVIDER NOTE - ATTENDING CONTRIBUTION TO CARE
67-year-old male with past medical history of hypertension, recent TURP procedure, CAD status post 1 stent, hyperlipidemia presents with 1 month of epigastric pain after eating.  States pain has been persistent since yesterday.  Describes it as dull achy.  States the pain is radiating to the back.  This afternoon patient called his daughter daughter states patient was short of breath when he called her complaining of pain is getting worse.  Patient also had an episode when he developed palpitations and passed out.  Syncope was witnessed by the daughter who is in the room now.  Patient denies any changes in strength or sensation in extremities.  No head trauma.  Patient states he is being worked up by his cardiologist for possible paroxysmal atrial fibrillation - has loop recorder on. Denies cp, sob, diarrhea. States last BM was 2ds ago. No blood in stool, black stools, hx of gastric ulcers. Pt states his HTN has improved since he had TURP procedure - he is only taking metoprolol for HTN.  Nontoxic-appearing.  No distress. Abdomen soft with mild tenderness upper abdomen.  Equal strength and sensation in lower extremities.  Pulses intact.EKG show tachycardia. Bifascicular block on the ekg that has been seen on past ekg as well. Concern for gall bladder pathology, arrhthymia, anemia, gastric ulcer. Low concern for dissection - however given hx of htn, pain radiating to back, syncope will obtain CT to eval.

## 2023-05-21 NOTE — ED PROVIDER NOTE - CLINICAL SUMMARY MEDICAL DECISION MAKING FREE TEXT BOX
----- Message from Rashaad Key MD sent at 1/11/2018 12:23 PM CST -----  No infection in stomach on biopsy   59-year-old male with past medical history of hypertension, hyperlipidemia, GERD, status post TURP in April 2023 presenting to the ED with episodes of abdominal pain radiating to the back associated with nausea, lightheadedness, shortness of breath.  Been going on for months however most recent episode has been persistent since last night.  Patient is being worked up for possible A-fib has monitor in place.   Patient with syncopal episode today.   Tachycardic to 105 in the room.  Normotensive.  Exam without focal neurodeficits, no abdominal tenderness to palpation,  clear lungs auscultation.  No pedal edema.  Given this possible new A-fib, abdominal pain rating to the back we will get CT angio chest abdomen pelvis to rule out dissection.  We will also get ACS labs.  We will also evaluate for cholecystitis, cholelithiasis etiology for abdominal pain after p.o.   likely admission.

## 2023-05-21 NOTE — ED PROVIDER NOTE - NS_BEDUNITTYPES_ED_ALL_ED
[TELEMETRY

## 2023-05-21 NOTE — ED PROVIDER NOTE - PROGRESS NOTE DETAILS
Marcel Platt MD (PGY2): EKG apart from [id number].  CTA done but no  obvious pathology on imaging however about waiting for official read.   Patient not an active chest pain.  This patient took aspirin at home 162, gave a another 162 in ED.   Cardiology consulted.  Awaiting recommendations. Marcel Platt MD (PGY2): Patient evaluated by cardiology, will Brilinta load and start heparin.  Endorsed to hospitalist, dr. Jensen.

## 2023-05-21 NOTE — ED ADULT TRIAGE NOTE - CHIEF COMPLAINT QUOTE
Pt arrives from home c/o SOB, N/V, dizziness since last night. RR even and unlabored. PMH: BPH, TURP, GERD, HTN, HLD, cardiac stents.

## 2023-05-21 NOTE — ED ADULT NURSE NOTE - NSFALLUNIVINTERV_ED_ALL_ED
Bed/Stretcher in lowest position, wheels locked, appropriate side rails in place/Call bell, personal items and telephone in reach/Instruct patient to call for assistance before getting out of bed/chair/stretcher/Non-slip footwear applied when patient is off stretcher/[place] to call system/Physically safe environment - no spills, clutter or unnecessary equipment/Purposeful proactive rounding/Room/bathroom lighting operational, light cord in reach

## 2023-05-21 NOTE — ED PROVIDER NOTE - NS ED ROS FT
CONSTITUTIONAL: No fevers, no chills, + lightheadedness, no dizziness  EYES: no visual changes, no eye pain  EARS: no ear drainage, no ear pain, no change in hearing  NOSE: no nasal congestion  MOUTH/THROAT: no sore throat  CV: No chest pain, no palpitations  RESP: see hpi   GI: see hpi   : no dysuria, no hematuria, no flank pain  MSK: see hpi   SKIN: no rashes  NEURO: no headache, no focal weakness, no decreased sensation/parasthesias   PSYCHIATRIC: no known mental health issues

## 2023-05-21 NOTE — ED PROVIDER NOTE - PHYSICAL EXAMINATION
General: Alert and Orientated x 3. No apparent distress.  Head: Normocephalic and atraumatic.  Eyes: PERRLA with EOMI.  Neck: Supple. Trachea midline.   Cardiac: Normal S1 and S2 w/  tachycardic.  I didNo murmurs appreciated.   Pulmonary: CTA bilaterally. No increased WOB. No wheezes or crackles.  Abdominal: Soft,  nondistended, non-tender. (+) bowel sounds appreciated in all 4 quadrants. No hepatosplenomegaly.   Neurologic: No focal sensory or motor deficits.  Musculoskeletal: Strength appropriate in all 4 extremities for age with no limited ROM.  Skin: Color appropriate for race. Intact, warm, and well-perfused.  Psychiatric: Appropriate mood and affect. No apparent risk to self or others.

## 2023-05-21 NOTE — ED PROVIDER NOTE - OBJECTIVE STATEMENT
Patient 67-year-old male with past medical history significant   Hypertension,  hyperlipidemia, CAD status post stent placement 12 years ago,  GERD, status post TURP April 2023  for presenting to the ED with  episodes of epigastric pain  associated with radiation to the back, nausea, sob  and lightheadedness.  These episodes have been occurring for the  past few months and usually last about 1 hour.  They always occur after eating.  Episode started last night after drinking some diet Coke however it has since persisted.  Patient has been using Tums and Pepto-Bismol without much relief.  initially patient declined syncopal episode however with attending daughter in room patient  disclosed that he did have a syncopal episode today.  He is also following with cardiology for possible paroxysmal A-fib, has a monitor in place. no fever, chills, urinary changes.  No bowel movement in 2 days however is passing gas.  Last cardiac work-up 1.5 years ago, follows w/ Dr. Mai.    No chest pain, pain on exertion, shortness of breath, edema.   Patient also states that he has not been taking all of his antihypertensives due to hypotension after his TURP procedure

## 2023-05-22 ENCOUNTER — TRANSCRIPTION ENCOUNTER (OUTPATIENT)
Age: 68
End: 2023-05-22

## 2023-05-22 VITALS
TEMPERATURE: 98 F | SYSTOLIC BLOOD PRESSURE: 131 MMHG | DIASTOLIC BLOOD PRESSURE: 76 MMHG | HEART RATE: 65 BPM | RESPIRATION RATE: 18 BRPM | OXYGEN SATURATION: 100 %

## 2023-05-22 DIAGNOSIS — Z79.899 OTHER LONG TERM (CURRENT) DRUG THERAPY: ICD-10-CM

## 2023-05-22 DIAGNOSIS — I24.9 ACUTE ISCHEMIC HEART DISEASE, UNSPECIFIED: ICD-10-CM

## 2023-05-22 DIAGNOSIS — I21.4 NON-ST ELEVATION (NSTEMI) MYOCARDIAL INFARCTION: ICD-10-CM

## 2023-05-22 DIAGNOSIS — Z29.9 ENCOUNTER FOR PROPHYLACTIC MEASURES, UNSPECIFIED: ICD-10-CM

## 2023-05-22 DIAGNOSIS — J45.909 UNSPECIFIED ASTHMA, UNCOMPLICATED: ICD-10-CM

## 2023-05-22 LAB
ANION GAP SERPL CALC-SCNC: 13 MMOL/L — SIGNIFICANT CHANGE UP (ref 7–14)
BUN SERPL-MCNC: 13 MG/DL — SIGNIFICANT CHANGE UP (ref 7–23)
CALCIUM SERPL-MCNC: 9.1 MG/DL — SIGNIFICANT CHANGE UP (ref 8.4–10.5)
CHLORIDE SERPL-SCNC: 102 MMOL/L — SIGNIFICANT CHANGE UP (ref 98–107)
CK MB BLD-MCNC: 6.7 % — HIGH (ref 0–2.5)
CK MB CFR SERPL CALC: 7.7 NG/ML — HIGH
CK SERPL-CCNC: 115 U/L — SIGNIFICANT CHANGE UP (ref 30–200)
CO2 SERPL-SCNC: 25 MMOL/L — SIGNIFICANT CHANGE UP (ref 22–31)
CREAT SERPL-MCNC: 0.93 MG/DL — SIGNIFICANT CHANGE UP (ref 0.5–1.3)
EGFR: 89 ML/MIN/1.73M2 — SIGNIFICANT CHANGE UP
GLUCOSE SERPL-MCNC: 110 MG/DL — HIGH (ref 70–99)
HCT VFR BLD CALC: 32.1 % — LOW (ref 39–50)
HGB BLD-MCNC: 9.8 G/DL — LOW (ref 13–17)
MAGNESIUM SERPL-MCNC: 2.4 MG/DL — SIGNIFICANT CHANGE UP (ref 1.6–2.6)
MCHC RBC-ENTMCNC: 26.1 PG — LOW (ref 27–34)
MCHC RBC-ENTMCNC: 30.5 GM/DL — LOW (ref 32–36)
MCV RBC AUTO: 85.4 FL — SIGNIFICANT CHANGE UP (ref 80–100)
NRBC # BLD: 0 /100 WBCS — SIGNIFICANT CHANGE UP (ref 0–0)
NRBC # FLD: 0 K/UL — SIGNIFICANT CHANGE UP (ref 0–0)
PHOSPHATE SERPL-MCNC: 2.9 MG/DL — SIGNIFICANT CHANGE UP (ref 2.5–4.5)
PLATELET # BLD AUTO: 265 K/UL — SIGNIFICANT CHANGE UP (ref 150–400)
POTASSIUM SERPL-MCNC: 4.5 MMOL/L — SIGNIFICANT CHANGE UP (ref 3.5–5.3)
POTASSIUM SERPL-SCNC: 4.5 MMOL/L — SIGNIFICANT CHANGE UP (ref 3.5–5.3)
RBC # BLD: 3.76 M/UL — LOW (ref 4.2–5.8)
RBC # FLD: 15.6 % — HIGH (ref 10.3–14.5)
SODIUM SERPL-SCNC: 140 MMOL/L — SIGNIFICANT CHANGE UP (ref 135–145)
TROPONIN T, HIGH SENSITIVITY RESULT: 217 NG/L — CRITICAL HIGH
TROPONIN T, HIGH SENSITIVITY RESULT: 351 NG/L — CRITICAL HIGH
WBC # BLD: 6.45 K/UL — SIGNIFICANT CHANGE UP (ref 3.8–10.5)
WBC # FLD AUTO: 6.45 K/UL — SIGNIFICANT CHANGE UP (ref 3.8–10.5)

## 2023-05-22 PROCEDURE — 92941 PRQ TRLML REVSC TOT OCCL AMI: CPT | Mod: LD

## 2023-05-22 PROCEDURE — 93010 ELECTROCARDIOGRAM REPORT: CPT

## 2023-05-22 PROCEDURE — 93458 L HRT ARTERY/VENTRICLE ANGIO: CPT | Mod: 26,59

## 2023-05-22 PROCEDURE — 12345: CPT | Mod: NC

## 2023-05-22 PROCEDURE — 99223 1ST HOSP IP/OBS HIGH 75: CPT | Mod: FS

## 2023-05-22 PROCEDURE — 92978 ENDOLUMINL IVUS OCT C 1ST: CPT | Mod: 26,LD

## 2023-05-22 PROCEDURE — 71275 CT ANGIOGRAPHY CHEST: CPT | Mod: 26,MA

## 2023-05-22 PROCEDURE — 74174 CTA ABD&PLVS W/CONTRAST: CPT | Mod: 26,MA

## 2023-05-22 PROCEDURE — 99223 1ST HOSP IP/OBS HIGH 75: CPT

## 2023-05-22 RX ORDER — ATORVASTATIN CALCIUM 80 MG/1
80 TABLET, FILM COATED ORAL AT BEDTIME
Refills: 0 | Status: DISCONTINUED | OUTPATIENT
Start: 2023-05-22 | End: 2023-05-22

## 2023-05-22 RX ORDER — PANTOPRAZOLE SODIUM 20 MG/1
40 TABLET, DELAYED RELEASE ORAL
Refills: 0 | Status: DISCONTINUED | OUTPATIENT
Start: 2023-05-22 | End: 2023-05-22

## 2023-05-22 RX ORDER — HEPARIN SODIUM 5000 [USP'U]/ML
INJECTION INTRAVENOUS; SUBCUTANEOUS
Qty: 25000 | Refills: 0 | Status: DISCONTINUED | OUTPATIENT
Start: 2023-05-22 | End: 2023-05-22

## 2023-05-22 RX ORDER — ATORVASTATIN CALCIUM 80 MG/1
1 TABLET, FILM COATED ORAL
Qty: 30 | Refills: 0
Start: 2023-05-22 | End: 2023-06-20

## 2023-05-22 RX ORDER — METOPROLOL TARTRATE 50 MG
1 TABLET ORAL
Qty: 60 | Refills: 0
Start: 2023-05-22 | End: 2023-06-20

## 2023-05-22 RX ORDER — ASPIRIN/CALCIUM CARB/MAGNESIUM 324 MG
162 TABLET ORAL ONCE
Refills: 0 | Status: COMPLETED | OUTPATIENT
Start: 2023-05-22 | End: 2023-05-22

## 2023-05-22 RX ORDER — LOSARTAN POTASSIUM 100 MG/1
100 TABLET, FILM COATED ORAL DAILY
Refills: 0 | Status: DISCONTINUED | OUTPATIENT
Start: 2023-05-22 | End: 2023-05-22

## 2023-05-22 RX ORDER — FINASTERIDE 5 MG/1
1 TABLET, FILM COATED ORAL
Refills: 0 | DISCHARGE

## 2023-05-22 RX ORDER — ASPIRIN/CALCIUM CARB/MAGNESIUM 324 MG
81 TABLET ORAL DAILY
Refills: 0 | Status: DISCONTINUED | OUTPATIENT
Start: 2023-05-22 | End: 2023-05-22

## 2023-05-22 RX ORDER — METOPROLOL TARTRATE 50 MG
50 TABLET ORAL
Refills: 0 | Status: DISCONTINUED | OUTPATIENT
Start: 2023-05-22 | End: 2023-05-22

## 2023-05-22 RX ORDER — TICAGRELOR 90 MG/1
180 TABLET ORAL ONCE
Refills: 0 | Status: COMPLETED | OUTPATIENT
Start: 2023-05-22 | End: 2023-05-22

## 2023-05-22 RX ORDER — ASPIRIN/CALCIUM CARB/MAGNESIUM 324 MG
162 TABLET ORAL DAILY
Refills: 0 | Status: DISCONTINUED | OUTPATIENT
Start: 2023-05-22 | End: 2023-05-22

## 2023-05-22 RX ORDER — OMEPRAZOLE 10 MG/1
1 CAPSULE, DELAYED RELEASE ORAL
Refills: 0 | DISCHARGE

## 2023-05-22 RX ORDER — NIFEDIPINE 30 MG
60 TABLET, EXTENDED RELEASE 24 HR ORAL DAILY
Refills: 0 | Status: DISCONTINUED | OUTPATIENT
Start: 2023-05-22 | End: 2023-05-22

## 2023-05-22 RX ORDER — PANTOPRAZOLE SODIUM 20 MG/1
1 TABLET, DELAYED RELEASE ORAL
Qty: 30 | Refills: 0
Start: 2023-05-22 | End: 2023-06-20

## 2023-05-22 RX ORDER — METOPROLOL TARTRATE 50 MG
1 TABLET ORAL
Refills: 0 | DISCHARGE

## 2023-05-22 RX ORDER — SODIUM CHLORIDE 9 MG/ML
500 INJECTION INTRAMUSCULAR; INTRAVENOUS; SUBCUTANEOUS
Refills: 0 | Status: COMPLETED | OUTPATIENT
Start: 2023-05-22 | End: 2023-05-22

## 2023-05-22 RX ORDER — SODIUM CHLORIDE 9 MG/ML
1000 INJECTION INTRAMUSCULAR; INTRAVENOUS; SUBCUTANEOUS
Refills: 0 | Status: DISCONTINUED | OUTPATIENT
Start: 2023-05-22 | End: 2023-05-22

## 2023-05-22 RX ORDER — ALBUTEROL 90 UG/1
2 AEROSOL, METERED ORAL EVERY 6 HOURS
Refills: 0 | Status: DISCONTINUED | OUTPATIENT
Start: 2023-05-22 | End: 2023-05-22

## 2023-05-22 RX ORDER — ATORVASTATIN CALCIUM 80 MG/1
1 TABLET, FILM COATED ORAL
Refills: 0 | DISCHARGE

## 2023-05-22 RX ORDER — TAMSULOSIN HYDROCHLORIDE 0.4 MG/1
1 CAPSULE ORAL
Refills: 0 | DISCHARGE

## 2023-05-22 RX ORDER — HEPARIN SODIUM 5000 [USP'U]/ML
4700 INJECTION INTRAVENOUS; SUBCUTANEOUS EVERY 6 HOURS
Refills: 0 | Status: DISCONTINUED | OUTPATIENT
Start: 2023-05-22 | End: 2023-05-22

## 2023-05-22 RX ORDER — HEPARIN SODIUM 5000 [USP'U]/ML
4700 INJECTION INTRAVENOUS; SUBCUTANEOUS ONCE
Refills: 0 | Status: COMPLETED | OUTPATIENT
Start: 2023-05-22 | End: 2023-05-22

## 2023-05-22 RX ORDER — BUDESONIDE AND FORMOTEROL FUMARATE DIHYDRATE 160; 4.5 UG/1; UG/1
2 AEROSOL RESPIRATORY (INHALATION)
Refills: 0 | Status: DISCONTINUED | OUTPATIENT
Start: 2023-05-22 | End: 2023-05-22

## 2023-05-22 RX ORDER — TICAGRELOR 90 MG/1
1 TABLET ORAL
Qty: 180 | Refills: 1
Start: 2023-05-22 | End: 2023-11-17

## 2023-05-22 RX ORDER — TICAGRELOR 90 MG/1
1 TABLET ORAL
Qty: 60 | Refills: 0
Start: 2023-05-22 | End: 2023-06-20

## 2023-05-22 RX ORDER — ASPIRIN/CALCIUM CARB/MAGNESIUM 324 MG
324 TABLET ORAL ONCE
Refills: 0 | Status: COMPLETED | OUTPATIENT
Start: 2023-05-22 | End: 2023-05-22

## 2023-05-22 RX ORDER — TICAGRELOR 90 MG/1
90 TABLET ORAL EVERY 12 HOURS
Refills: 0 | Status: DISCONTINUED | OUTPATIENT
Start: 2023-05-22 | End: 2023-05-22

## 2023-05-22 RX ADMIN — SODIUM CHLORIDE 75 MILLILITER(S): 9 INJECTION INTRAMUSCULAR; INTRAVENOUS; SUBCUTANEOUS at 10:16

## 2023-05-22 RX ADMIN — TICAGRELOR 180 MILLIGRAM(S): 90 TABLET ORAL at 05:53

## 2023-05-22 RX ADMIN — Medication 162 MILLIGRAM(S): at 03:41

## 2023-05-22 RX ADMIN — Medication 50 MILLIGRAM(S): at 18:55

## 2023-05-22 RX ADMIN — HEPARIN SODIUM 4700 UNIT(S): 5000 INJECTION INTRAVENOUS; SUBCUTANEOUS at 05:52

## 2023-05-22 RX ADMIN — SODIUM CHLORIDE 150 MILLILITER(S): 9 INJECTION INTRAMUSCULAR; INTRAVENOUS; SUBCUTANEOUS at 13:00

## 2023-05-22 RX ADMIN — HEPARIN SODIUM 950 UNIT(S)/HR: 5000 INJECTION INTRAVENOUS; SUBCUTANEOUS at 08:28

## 2023-05-22 RX ADMIN — TICAGRELOR 90 MILLIGRAM(S): 90 TABLET ORAL at 17:37

## 2023-05-22 RX ADMIN — HEPARIN SODIUM 950 UNIT(S)/HR: 5000 INJECTION INTRAVENOUS; SUBCUTANEOUS at 05:52

## 2023-05-22 NOTE — DISCHARGE NOTE NURSING/CASE MANAGEMENT/SOCIAL WORK - PATIENT PORTAL LINK FT
You can access the FollowMyHealth Patient Portal offered by [facility] by registering at the following website: [email or web address]. By joining [employer]’s FollowMyHealth portal, you will also be able to view your health information using other applications (apps) compatible with our system.

## 2023-05-22 NOTE — DISCHARGE NOTE PROVIDER - CARE PROVIDER_API CALL
Garry Dorman)  Cardiovascular Disease; Internal Medicine  [address], [place] [address]  Phone: [phone]  Fax: [phone]  Follow Up Time:

## 2023-05-22 NOTE — PATIENT PROFILE ADULT - FALL HARM RISK - HARM RISK INTERVENTIONS

## 2023-05-22 NOTE — CONSULT NOTE ADULT - SUBJECTIVE AND OBJECTIVE BOX
HISTORY OF PRESENT ILLNESS:      Allergies    No Known Allergies    Intolerances    	    MEDICATIONS:        aspirin 162 milliGRAM(s) Oral daily            PAST MEDICAL & SURGICAL HISTORY:  HTN (hypertension)      HLD (hyperlipidemia)      BPH (benign prostatic hyperplasia)      S/P coronary artery stent placement      GERD (gastroesophageal reflux disease)      Kidney stones          FAMILY HISTORY:      SOCIAL HISTORY:    [ ] Non-smoker  [ ] Smoker  [ ] Alcohol    REVIEW OF SYSTEMS:  See HPI, otherwise complete 10 point review of systems negative    PHYSICAL EXAM:  T(C): 36.9 (05-21-23 @ 22:36), Max: 37.2 (05-21-23 @ 18:28)  HR: 81 (05-22-23 @ 02:15) (81 - 104)  BP: 136/75 (05-22-23 @ 02:15) (118/57 - 136/75)  RR: 17 (05-22-23 @ 02:15) (15 - 17)  SpO2: 100% (05-22-23 @ 02:15) (100% - 100%)  Wt(kg): --  I&O's Summary      Appearance: No Acute Distress	  HEENT:  Normal oral mucosa, PERRL, EOMI	  Cardiovascular: Normal S1 S2, No JVD, No murmurs/rubs/gallops  Respiratory: Lungs clear to auscultation bilaterally  Gastrointestinal:  Soft, Non-tender, + BS	  Skin: No rashes, No ecchymoses, No cyanosis	  Neurologic: Non-focal  Extremities: No clubbing, cyanosis or edema  Vascular: Peripheral pulses palpable 2+ bilaterally  Psychiatry: A & O x 3, Mood & affect appropriate    LABS:	 	    CBC Full  -  ( 21 May 2023 19:53 )  WBC Count : 10.01 K/uL  Hemoglobin : 9.5 g/dL  Hematocrit : 30.7 %  Platelet Count - Automated : 270 K/uL  Mean Cell Volume : 82.1 fL  Mean Cell Hemoglobin : 25.4 pg  Mean Cell Hemoglobin Concentration : 30.9 gm/dL  Auto Neutrophil # : 8.35 K/uL  Auto Lymphocyte # : 0.76 K/uL  Auto Monocyte # : 0.82 K/uL  Auto Eosinophil # : 0.00 K/uL  Auto Basophil # : 0.03 K/uL  Auto Neutrophil % : 83.4 %  Auto Lymphocyte % : 7.6 %  Auto Monocyte % : 8.2 %  Auto Eosinophil % : 0.0 %  Auto Basophil % : 0.3 %    05-21    134<L>  |  98  |  21  ----------------------------<  126<H>  3.6   |  21<L>  |  1.14    Ca    9.2      21 May 2023 19:53  Mg     2.00     05-21    TPro  6.6  /  Alb  4.5  /  TBili  0.7  /  DBili  x   /  AST  19  /  ALT  11  /  AlkPhos  71  05-21      proBNP:   Lipid Profile:   HgA1c:   TSH:       CARDIAC MARKERS:            TELEMETRY: 	    ECG:  	  RADIOLOGY:  OTHER: 	    PREVIOUS DIAGNOSTIC TESTING:    [ ] Echocardiogram:  [ ] Catheterization:  [ ] Stress Test:  	  	       HISTORY OF PRESENT ILLNESS:    This is a 67-year-old male with past medical history significant   Hypertension,  hyperlipidemia, CAD status post stent placement 12 years ago,  GERD, status post TURP April 2023  for presenting to the ED with  episodes of epigastric pain  associated with radiation to the back, nausea, sob  and lightheadedness.  These episodes have been occurring for the  past few months and usually last about 1 hour.  They always occur after eating.  Episode started last night after drinking some diet Coke however it has since persisted.  Patient has been using Tums and Pepto-Bismol without much relief.  initially patient declined syncopal episode however with attending daughter in room patient  disclosed that he did have a syncopal episode today.  He is also following with cardiology for possible paroxysmal A-fib, has a monitor in place. no fever, chills, urinary changes.  No bowel movement in 2 days however is passing gas.  Last cardiac work-up 1.5 years ago, follows w/ Dr. Mai.    No chest pain, pain on exertion, shortness of breath, edema.   Patient also states that he has not been taking all of his antihypertensives due to hypotension after his TURP procedure    Allergies    No Known Allergies    Intolerances    	    MEDICATIONS:        aspirin 162 milliGRAM(s) Oral daily            PAST MEDICAL & SURGICAL HISTORY:  HTN (hypertension)      HLD (hyperlipidemia)      BPH (benign prostatic hyperplasia)      S/P coronary artery stent placement      GERD (gastroesophageal reflux disease)      Kidney stones          FAMILY HISTORY:      SOCIAL HISTORY:    Retired [employer] worker, lives with wife and 2 adult daughters. Denies smoking/ETOH    REVIEW OF SYSTEMS:  See HPI, otherwise complete 10 point review of systems negative    PHYSICAL EXAM:  T(C): 36.9 (05-21-23 @ 22:36), Max: 37.2 (05-21-23 @ 18:28)  HR: 81 (05-22-23 @ 02:15) (81 - 104)  BP: 136/75 (05-22-23 @ 02:15) (118/57 - 136/75)  RR: 17 (05-22-23 @ 02:15) (15 - 17)  SpO2: 100% (05-22-23 @ 02:15) (100% - 100%)  Wt(kg): --  I&O's Summary      Appearance: No Acute Distress	  HEENT:  Normal oral mucosa, PERRL, EOMI	  Cardiovascular: Normal S1 S2, No JVD, No murmurs/rubs/gallops  Respiratory: Lungs clear to auscultation bilaterally  Gastrointestinal:  Soft, Non-tender, + BS	  Skin: No rashes, No ecchymoses, No cyanosis	  Neurologic: Non-focal  Extremities: No clubbing, cyanosis or edema  Vascular: Peripheral pulses palpable 2+ bilaterally  Psychiatry: A & O x 3, Mood & affect appropriate    LABS:	 	    CBC Full  -  ( 21 May 2023 19:53 )  WBC Count : 10.01 K/uL  Hemoglobin : 9.5 g/dL  Hematocrit : 30.7 %  Platelet Count - Automated : 270 K/uL  Mean Cell Volume : 82.1 fL  Mean Cell Hemoglobin : 25.4 pg  Mean Cell Hemoglobin Concentration : 30.9 gm/dL  Auto Neutrophil # : 8.35 K/uL  Auto Lymphocyte # : 0.76 K/uL  Auto Monocyte # : 0.82 K/uL  Auto Eosinophil # : 0.00 K/uL  Auto Basophil # : 0.03 K/uL  Auto Neutrophil % : 83.4 %  Auto Lymphocyte % : 7.6 %  Auto Monocyte % : 8.2 %  Auto Eosinophil % : 0.0 %  Auto Basophil % : 0.3 %    05-21    134<L>  |  98  |  21  ----------------------------<  126<H>  3.6   |  21<L>  |  1.14    Ca    9.2      21 May 2023 19:53  Mg     2.00     05-21    TPro  6.6  /  Alb  4.5  /  TBili  0.7  /  DBili  x   /  AST  19  /  ALT  11  /  AlkPhos  71  05-21      proBNP:   Lipid Profile:   HgA1c:   TSH:       CARDIAC MARKERS:            TELEMETRY: 	    ECG:  	  RADIOLOGY:  OTHER: 	    PREVIOUS DIAGNOSTIC TESTING:    [ ] Echocardiogram:  [ ] Catheterization:  [ ] Stress Test:  	  	       HISTORY OF PRESENT ILLNESS:    This is a 67-year-old man with past medical history of CAD s/p DIOGENES (2012), Hypertension, Hyperlipidemia, GERD, status post TURP April 2023  for presenting to the ED with  episodes of epigastric pain associated with radiation to the back, nausea, sob  and lightheadedness found to have t wave inversions in anterior leads and elevated troponins concerning for NSTEMI.  Cardiology called on consult for NSTEMI.  Patient reports epigastric pain with radiation to back started 5/20 at 11pm while watching TV.  Patient reports he has intermittent epigastric pain that normally resolves in 30 minutes to 1 hour that he associates with his GERD but today's epigastric pain was unrelieved and came to ED.  In ED patient given aspirin 324mg.  He reports he has been feeling lightheadedness with fast heart rates since 4/2023 after his TURP he associates with hypotension and has stopped taking his antihypertensive medications.  He recently saw Dr. Ureña and was placed on holter monitor for evaluation.  He reports he had a stress test 1.5 years ago with his previous cardiologist Dr. Mai he reports was normal.    He denies fever, chills, recent sick contact, chest pain, SOB, N/V/D.  On assessment, patient A+OX3 in no acute distress, SR 70-90s, /76, SATing 100% on RA, RR 20, Lung sounds clear in all fields, no pedal edema, no JVD. Labs show no leukoytosis, mild anemia with H+H 9.5/30.7, lactate 2.4, Troponin 46 -> 351, pBNP 154.  Chest x ray clear, CT Abdomen no dissection    Allergies    No Known Allergies    Intolerances    HOME MEDICATIONS   · Aspirin 81 81 MG Oral Tablet Delayed Release; TAKE 1 TABLET EVERY DAY   · Atorvastatin Calcium 40 MG Oral Tablet; take 1 tablet at bedtime   · Hydrocortisone 2.5 % External Lotion; Apply nightly to affected areas only for itching   · Losartan Potassium 100 MG Oral Tablet; Take 1 tablet daily   · Metoprolol Succinate ER 50 MG Oral Tablet Extended Release 24 Hour; Take 1 tablet  twice daily   · Montelukast Sodium 10 MG Oral Tablet; TAKE 1 TABLET IN THE EVENING, DAILY   · NIFEdipine ER 60 MG Oral Tablet Extended Release 24 Hour   · Omeprazole 20 MG Oral Capsule Delayed Release; TAKE 1 CAPSULE DAILY EVERY  MORNING BEFORE BREAKFAST   · Pneumovax 23 25 MCG/0.5ML INJ   · Pulmicort Flexhaler 90 MCG/ACT Inhalation Aerosol Powder Breath Activated; INHALE 1  PUFF TWICE DAILY. RINSE MOUTH AFTER USE   · Ventolin  (90 Base) MCG/ACT Inhalation Aerosol Solution; INHALE 1 TO 2 PUFFS  EVERY 4 TO 6 HOURS AS NEEDED      MEDICATIONS:        aspirin 162 milliGRAM(s) Oral daily            PAST MEDICAL & SURGICAL HISTORY:  HTN (hypertension)      HLD (hyperlipidemia)      BPH (benign prostatic hyperplasia)      S/P coronary artery stent placement      GERD (gastroesophageal reflux disease)      Kidney stones          FAMILY HISTORY: non-contributory      SOCIAL HISTORY:    Retired [employer] worker, lives with wife and 2 adult daughters. Denies smoking/ETOH    REVIEW OF SYSTEMS:  See HPI, otherwise complete 10 point review of systems negative    PHYSICAL EXAM:  T(C): 36.9 (05-21-23 @ 22:36), Max: 37.2 (05-21-23 @ 18:28)  HR: 81 (05-22-23 @ 02:15) (81 - 104)  BP: 136/75 (05-22-23 @ 02:15) (118/57 - 136/75)  RR: 17 (05-22-23 @ 02:15) (15 - 17)  SpO2: 100% (05-22-23 @ 02:15) (100% - 100%)  Wt(kg): --  I&O's Summary      Appearance: No Acute Distress	  HEENT:  Normal oral mucosa, PERRL, EOMI	  Cardiovascular: Normal S1 S2, No JVD, No murmurs/rubs/gallops  Respiratory: Lungs clear to auscultation bilaterally  Gastrointestinal:  Soft, Non-tender, + BS	  Skin: No rashes, No ecchymoses, No cyanosis	  Neurologic: Non-focal  Extremities: No clubbing, cyanosis or edema  Vascular: Peripheral pulses palpable 2+ bilaterally  Psychiatry: A & O x 3, Mood & affect appropriate    LABS:	 	    CBC Full  -  ( 21 May 2023 19:53 )  WBC Count : 10.01 K/uL  Hemoglobin : 9.5 g/dL  Hematocrit : 30.7 %  Platelet Count - Automated : 270 K/uL  Mean Cell Volume : 82.1 fL  Mean Cell Hemoglobin : 25.4 pg  Mean Cell Hemoglobin Concentration : 30.9 gm/dL  Auto Neutrophil # : 8.35 K/uL  Auto Lymphocyte # : 0.76 K/uL  Auto Monocyte # : 0.82 K/uL  Auto Eosinophil # : 0.00 K/uL  Auto Basophil # : 0.03 K/uL  Auto Neutrophil % : 83.4 %  Auto Lymphocyte % : 7.6 %  Auto Monocyte % : 8.2 %  Auto Eosinophil % : 0.0 %  Auto Basophil % : 0.3 %    05-21    134<L>  |  98  |  21  ----------------------------<  126<H>  3.6   |  21<L>  |  1.14    Ca    9.2      21 May 2023 19:53  Mg     2.00     05-21    TPro  6.6  /  Alb  4.5  /  TBili  0.7  /  DBili  x   /  AST  19  /  ALT  11  /  AlkPhos  71  05-21      proBNP:   Lipid Profile:   HgA1c:   TSH:       CARDIAC MARKERS:            TELEMETRY: 	  SR 70s  ECG:  	  RADIOLOGY:  OTHER: 	    PREVIOUS DIAGNOSTIC TESTING:    [ ] Echocardiogram:  [ ] Catheterization:  [ ] Stress Test:

## 2023-05-22 NOTE — ED ADULT NURSE REASSESSMENT NOTE - NS ED NURSE REASSESS COMMENT FT1
Pt found to have an elevated troponin. MD aware. Pt currently asymptomatic. Denies dizziness, palpitations, SOB or chest pain. Continuous telemetry in place. Repeat ECG to be taken.
Break Coverage RN: Pt resting in stretcher at this time, c/o abdominal discomfort, MD aware.. Vital signs documented. Pending CTA. Normal sinus on cardiac monitor. Safety maintained.

## 2023-05-22 NOTE — DISCHARGE NOTE PROVIDER - NSDCCPCAREPLAN_GEN_ALL_CORE_FT
111
PRINCIPAL DISCHARGE DIAGNOSIS  Diagnosis: NSTEMI (non-ST elevation myocardial infarction)  Assessment and Plan of Treatment: You were found to have a 70% blockage in one of your coronary arteries which was stented. You must continue taking Aspirin 81mg daily and Brilinta 90mg twice daily. A prescription was sent to your pharmacy, which needs to be picked up today after you leave the hospital. Continue to take your other medications as prescribed. Follow up with your cardiologist within 1 week of discharge.

## 2023-05-22 NOTE — DISCHARGE NOTE PROVIDER - NSDCFUADDAPPT_GEN_ALL_CORE_FT
1. Follow up with Dr. Del Castillo within 1 week. Call to make an appointment.  2. Continue to take your medications as prescribed.  3. Brilinta was sent to your pharmacy. Please pick it up today and start taking it tomorrow.

## 2023-05-22 NOTE — H&P ADULT - PROBLEM SELECTOR PLAN 4
reconciled med rec not allowing me to enter new meds which include:  lopressor 50 bid  Breo-Ellipta 100/25

## 2023-05-22 NOTE — H&P ADULT - NSHPLABSRESULTS_GEN_ALL_CORE
9.5    10.01 )-----------( 270      ( 21 May 2023 19:53 )             30.7     05-21    134<L>  |  98  |  21  ----------------------------<  126<H>  3.6   |  21<L>  |  1.14    Ca    9.2      21 May 2023 19:53  Mg     2.00     05-21    TPro  6.6  /  Alb  4.5  /  TBili  0.7  /  DBili  x   /  AST  19  /  ALT  11  /  AlkPhos  71  05-21    CAPILLARY BLOOD GLUCOSE        PT/INR - ( 21 May 2023 20:07 )   PT: 12.9 sec;   INR: 1.11 ratio         PTT - ( 21 May 2023 20:07 )  PTT:23.9 sec      Vital Signs Last 24 Hrs  T(C): 36.8 (22 May 2023 06:32), Max: 37.2 (21 May 2023 18:28)  T(F): 98.2 (22 May 2023 06:32), Max: 98.9 (21 May 2023 18:28)  HR: 65 (22 May 2023 06:32) (62 - 104)  BP: 131/76 (22 May 2023 06:32) (118/57 - 136/75)  BP(mean): --  RR: 18 (22 May 2023 06:32) (15 - 18)  SpO2: 100% (22 May 2023 06:32) (99% - 100%)    Parameters below as of 22 May 2023 06:32  Patient On (Oxygen Delivery Method): room air

## 2023-05-22 NOTE — H&P ADULT - NSHPREVIEWOFSYSTEMS_GEN_ALL_CORE
Review of Systems:   CONSTITUTIONAL: No fever, weight loss, or fatigue  EYES: No eye pain, visual disturbances, or discharge  ENMT:  No difficulty hearing, tinnitus, vertigo; No sinus or throat pain  NECK: No pain or stiffness  RESPIRATORY: No cough, wheezing, chills or hemoptysis; No shortness of breath  CARDIOVASCULAR: No chest pain, palpitations, dizziness, or leg swelling  GASTROINTESTINAL: No current abdominal or epigastric pain. No nausea, vomiting, or hematemesis; No diarrhea or constipation. No melena or hematochezia.  GENITOURINARY: No dysuria, frequency, hematuria, or incontinence  NEUROLOGICAL: No headaches, memory loss, loss of strength, numbness, or tremors  SKIN: No itching, burning, rashes, or lesions   LYMPH NODES: No enlarged glands  ENDOCRINE: No heat or cold intolerance; No hair loss  MUSCULOSKELETAL: No joint pain or swelling; No muscle, back, or extremity pain

## 2023-05-22 NOTE — CONSULT NOTE ADULT - ASSESSMENT
67M PMH CAD s/p DIOGENES (2012),HTN, HLD, GERD, status post TURP April 2023 admitted for ACS with uptrending troponins from 43 -> 356

## 2023-05-22 NOTE — CONSULT NOTE ADULT - PROBLEM SELECTOR RECOMMENDATION 9
MARKY 6, ACS Ada 118  Admit to Telemetry  Start Heparin gtt ACS protocol  Received Aspirin 324mg, continue Aspirin 81mg daily  Give Brilinta 180mg now then Brilinta 90mg BID  Lipitor 80mg daily  Troponin, CK, CK-MB q8h, serial ECG  HgA1c, Lipid profile, TSH, T+S  Daily CBC, BMP, Mag, Phos, PT/INR/PTT  TTE in am to evaluate LV function  Keep NPO for possible ischemic workup  Cardiology to follow

## 2023-05-22 NOTE — DISCHARGE NOTE PROVIDER - NSDCFUSCHEDAPPT_GEN_ALL_CORE_FT
Tahira Moon  [facility]  UROLOGY [address] [place] [address]  Scheduled Appointment: 05/26/2023    Garry Ureña  [facility]  CARDIOLOGY [address]   Scheduled Appointment: 07/14/2023

## 2023-05-22 NOTE — CONSULT NOTE ADULT - NSCONSULTADDITIONALINFOA_GEN_ALL_CORE
Plan discussed with Dr. ROSALIE Richards, Card Fellow Plan discussed with Dr. ROSALIE Richards, [place] Fellow    This patient was seen and examined personally by me and the plan was discussed with the fellow and/or KRIS above. Amendments were made as necessary to the above. Agree with the excellent note and plan above. [facility] pending.    Adán Trevino MD, MPhil, [facility]  Cardiologist, [facility]  ; [facility] and [facility]  Email: [email or web address] on-service contact/call information, go to [email or web address] and use "[email or web address]" to login.  Outpatient Cardiology appointments, call [phone] to arrange with a colleague; I do not have outpatient Cardiology clinic.

## 2023-05-22 NOTE — H&P ADULT - HISTORY OF PRESENT ILLNESS
67-year-old man with past medical history of CAD s/p DIOGENES (2012), Hypertension, Hyperlipidemia, GERD, status post TURP April 2023  for presenting to the ED with  episodes of epigastric pain (mainly postprandial)associated with radiation to the back, nausea, sob  and lightheadedness found to have t wave inversions in anterior leads and elevated troponins concerning for NSTEMI. CTA chest/abd negative for dissection; pt denies leg swelling, fever, cough. Notes resolution of epigastric and right upper shoulder pain  at time of my exam, EKG with bifascicular block seen on prior ekg, trop 46, 351; Seen  by cardiology, dapt loaded, started on heparin gtt

## 2023-05-22 NOTE — DISCHARGE NOTE PROVIDER - NSDCMRMEDTOKEN_GEN_ALL_CORE_FT
Albuterol (Eqv-ProAir HFA) 90 mcg/inh inhalation aerosol: 1 inhaled as needed for  shortness of breath and/or wheezing  aspirin 81 mg oral delayed release tablet: 1 tab(s) orally once a day MDD: 1  atorvastatin 80 mg oral tablet: 1 tab(s) orally once a day (at bedtime)  Brilinta (ticagrelor) 90 mg oral tablet: 1 tab(s) orally 2 times a day  losartan 100 mg oral tablet: 1 orally once a day  metoprolol tartrate 50 mg oral tablet: 1 tab(s) orally 2 times a day  NIFEdipine (Eqv-Adalat CC) 60 mg oral tablet, extended release: 1 orally once a day  pantoprazole 40 mg oral delayed release tablet: 1 tab(s) orally once a day (before a meal)   Albuterol (Eqv-ProAir HFA) 90 mcg/inh inhalation aerosol: 1 inhaled as needed for  shortness of breath and/or wheezing  aspirin 81 mg oral delayed release tablet: 1 tab(s) orally once a day MDD: 1  atorvastatin 80 mg oral tablet: 1 tab(s) orally once a day (at bedtime)  losartan 100 mg oral tablet: 1 orally once a day  metoprolol tartrate 50 mg oral tablet: 1 tab(s) orally 2 times a day  NIFEdipine (Eqv-Adalat CC) 60 mg oral tablet, extended release: 1 orally once a day  pantoprazole 40 mg oral delayed release tablet: 1 tab(s) orally once a day (before a meal)  ticagrelor 90 mg oral tablet: 1 tab(s) orally every 12 hours

## 2023-05-22 NOTE — DISCHARGE NOTE NURSING/CASE MANAGEMENT/SOCIAL WORK - NSDCPEFALRISK_GEN_ALL_CORE
For information on Fall & Injury Prevention, visit: [email or web address] OR  [email or web address] OR  https://www.cdc.gov/steadi/patient.html

## 2023-05-22 NOTE — CHART NOTE - NSCHARTNOTEFT_GEN_A_CORE
After bedrest while patient walking, pt admits to intermittent palpitations. Pt sinus tachycardia to 105 bpm on telemetry. No other complaints. Pt with a Zio patch on which was placed by Dr. Del Castillo at last office visit. Pt feels stable to go home. Discussed with Dr. Ernst, pt medically stable. Pt encouraged to hydrate and call Dr. Garcia for appointment this week.

## 2023-05-22 NOTE — DISCHARGE NOTE PROVIDER - HOSPITAL COURSE
68 y/o male with a PMHx of CAD s/p stent placement, HTN, HLD, GERD and BPH s/p TURP presented to [facility] complaining of epigastric pain. EKG showed NSR with RBBB. Delta troponin elevated with peak troponin 351. Chest X-ray negative. US abdomen negative. CT angio chest, abdomen, pelvis negative. Pt was admitted to telemetry, loaded with Brilinta and started on Heparin gtt. Pt underwent cardiac catheterization and was found to have a mid LAD lesion of 70% which was stented. LV gram done in cath lab showed normal LV function. No need for echocardiogram as per Dr. Ernst. Pt was monitored in [facility] post procedure without complication. Case discussed with Dr. Ernst and Dr. Dang, pt is medically stable for discharge home. 68 y/o male with a PMHx of CAD s/p stent placement, HTN, HLD, GERD and BPH s/p TURP presented to [facility] complaining of epigastric pain. EKG showed NSR with RBBB. Delta troponin elevated with peak troponin 351. Chest X-ray negative. US abdomen negative. CT angio chest, abdomen, pelvis negative. Pt was admitted to telemetry, loaded with Brilinta and started on Heparin gtt. Pt underwent cardiac catheterization and was found to have a mid LAD lesion of 70% which was stented. LV gram done in cath lab showed normal LV function. No need for echocardiogram as per Dr. Ernst. Pt was monitored in [facility] post procedure without complication. Case discussed with Dr. Ernst and Dr. Dang, pt is medically stable for discharge home.    Brilinta test script sent to [facility] and is $40/month, which patient is agreeable to pay. Will send 6 month supply to patient's local pharmacy. Patient instructed to get further refills with cardiologist.

## 2023-05-23 ENCOUNTER — TRANSCRIPTION ENCOUNTER (OUTPATIENT)
Age: 68
End: 2023-05-23

## 2023-05-25 ENCOUNTER — TRANSCRIPTION ENCOUNTER (OUTPATIENT)
Age: 68
End: 2023-05-25

## 2023-05-25 ENCOUNTER — APPOINTMENT (OUTPATIENT)
Age: 68
End: 2023-05-25
Payer: MEDICARE

## 2023-05-25 PROCEDURE — 99443: CPT | Mod: 95

## 2023-05-26 ENCOUNTER — APPOINTMENT (OUTPATIENT)
Dept: UROLOGY | Facility: CLINIC | Age: 68
End: 2023-05-26
Payer: MEDICARE

## 2023-05-26 VITALS
HEIGHT: 70 IN | TEMPERATURE: 98 F | BODY MASS INDEX: 23.62 KG/M2 | WEIGHT: 165 LBS | HEART RATE: 95 BPM | SYSTOLIC BLOOD PRESSURE: 110 MMHG | OXYGEN SATURATION: 100 % | DIASTOLIC BLOOD PRESSURE: 75 MMHG

## 2023-05-26 PROCEDURE — 99024 POSTOP FOLLOW-UP VISIT: CPT

## 2023-05-26 RX ORDER — CIPROFLOXACIN HYDROCHLORIDE 500 MG/1
500 TABLET, FILM COATED ORAL TWICE DAILY
Qty: 14 | Refills: 0 | Status: DISCONTINUED | COMMUNITY
Start: 2023-04-18 | End: 2023-05-26

## 2023-05-26 RX ORDER — HYDROCORTISONE 25 MG/ML
2.5 LOTION TOPICAL
Qty: 1 | Refills: 1 | Status: DISCONTINUED | COMMUNITY
Start: 2022-09-28 | End: 2023-05-26

## 2023-05-26 NOTE — PLAN
[FreeTextEntry1] : [employer] TCM STARS teaching: Adhere to low salt, low fat diet and educated patient on foods that should be avoided such as processed or fast food. Continue medications as ordered. Follow up with Cardiologist. Contact information given, patient advised to call with any questions/concerns.

## 2023-05-26 NOTE — REVIEW OF SYSTEMS
[Fatigue] : fatigue [Shortness Of Breath] : shortness of breath [Heartburn] : heartburn [Anxiety] : anxiety [Negative] : Neurological [Fever] : no fever [Chills] : no chills [Hematuria] : no hematuria [FreeTextEntry8] : R Patient

## 2023-05-26 NOTE — HISTORY OF PRESENT ILLNESS
[Home] : at home, [unfilled] , at the time of the visit. [Other Location: e.g. Home (Enter Location, City,State)___] : at [unfilled] [Verbal consent obtained from patient] : the patient, [unfilled] [FreeTextEntry1] : F/u post hospital discharge STAR NSTEMI [de-identified] : Mr. Gutierrez is a 67 year old male with history of WTC exposure, CAD s/p stent, HTN, HLD, GERD and BPH s/p TURP presented to [facility] complaining of epigastric pain and admitted on 5/22 for short stay. EKG showed NSR with RBBB, delta troponin elevated with peak troponin 351. Chest x-ray negative, CT angio negative. Patient underwent cardiac catheterization and was found to have a mid LAD lesion of 70%, stented. LV gram done in cath lab showed normal LV function. He was discharged home with outpatient follow up.\par \par Mr. Gutierrez is seen via telecommunication video visit, appears pleasant and in nad. Since home - had an episode of SOB this morning that resolved in seconds and denies any other episodes. Also endorsed experiencing symptoms similar to heart burn and was not taking his pantoprazole, now taking with improvement. \par Monitoring home BPs - reports SBP 150s prior to medication and then 120s after. Taking Metoprolol and nifedipine only, self discontinued losartan due to previously low SBPs in 100s. \par \par Patient shared that he is feeling more anxious since hospitalization and has a history of anxiety, previously on zoloft and stopped as he did not find it helpful. \par Positive PHQ9, 12, moderate depression. Denies any SI/HI or abnormal thoughts.\par Patient was able to verbalize a good support system and goal is to get better. Lives with supportive family (spouse, 2 adult children and 1 grandchild).\par Patient amenable to connect with psychiatry, [facility] contact provided and referred to behavioral health team. Safety plan reinforced.\par \par Previously was seeing PCP Dr. Jazmyn Penny and would like to establish care in network, referral made. \par \par Urology - Dr. Moon 5/26\par Cardiologist - Dr. Ureña 6/2

## 2023-05-26 NOTE — HEALTH RISK ASSESSMENT
[Patient/Caregiver unclear of wishes] : , patient/caregiver unclear of wishes [AdvancecareDate] : 5/25/2023 [FreeTextEntry4] : Discussed health care proxy, states he would prefer his spouse to be his proxy, but will discuss with his adult children first

## 2023-05-26 NOTE — PHYSICAL EXAM
[No Acute Distress] : no acute distress [Well Nourished] : well nourished [Well-Appearing] : well-appearing [Normal Sclera/Conjunctiva] : normal sclera/conjunctiva [No Respiratory Distress] : no respiratory distress  [No Accessory Muscle Use] : no accessory muscle use [Alert and Oriented x3] : oriented to person, place, and time [Normal Insight/Judgement] : insight and judgment were intact [de-identified] : Physical exam limited d/t telehealth encounter

## 2023-05-27 ENCOUNTER — TRANSCRIPTION ENCOUNTER (OUTPATIENT)
Age: 68
End: 2023-05-27

## 2023-05-27 ENCOUNTER — EMERGENCY (EMERGENCY)
Facility: HOSPITAL | Age: 68
LOS: 1 days | Discharge: HOME | End: 2023-05-27
Attending: EMERGENCY MEDICINE | Admitting: EMERGENCY MEDICINE
Payer: MEDICARE

## 2023-05-27 VITALS
SYSTOLIC BLOOD PRESSURE: 118 MMHG | HEIGHT: 68.5 IN | OXYGEN SATURATION: 100 % | TEMPERATURE: 98 F | RESPIRATION RATE: 16 BRPM | DIASTOLIC BLOOD PRESSURE: 63 MMHG | HEART RATE: 91 BPM

## 2023-05-27 VITALS
RESPIRATION RATE: 16 BRPM | SYSTOLIC BLOOD PRESSURE: 130 MMHG | HEART RATE: 78 BPM | OXYGEN SATURATION: 100 % | TEMPERATURE: 98 F | DIASTOLIC BLOOD PRESSURE: 77 MMHG

## 2023-05-27 DIAGNOSIS — N20.0 CALCULUS OF KIDNEY: Chronic | ICD-10-CM

## 2023-05-27 LAB
ALBUMIN SERPL ELPH-MCNC: 4.6 G/DL — SIGNIFICANT CHANGE UP (ref 3.3–5)
ALP SERPL-CCNC: 69 U/L — SIGNIFICANT CHANGE UP (ref 40–120)
ALT FLD-CCNC: 11 U/L — SIGNIFICANT CHANGE UP (ref 4–41)
ANION GAP SERPL CALC-SCNC: 13 MMOL/L — SIGNIFICANT CHANGE UP (ref 7–14)
APPEARANCE UR: CLEAR — SIGNIFICANT CHANGE UP
AST SERPL-CCNC: 13 U/L — SIGNIFICANT CHANGE UP (ref 4–40)
BACTERIA # UR AUTO: NEGATIVE — SIGNIFICANT CHANGE UP
BASOPHILS # BLD AUTO: 0.04 K/UL — SIGNIFICANT CHANGE UP (ref 0–0.2)
BASOPHILS NFR BLD AUTO: 0.4 % — SIGNIFICANT CHANGE UP (ref 0–2)
BILIRUB SERPL-MCNC: 0.7 MG/DL — SIGNIFICANT CHANGE UP (ref 0.2–1.2)
BILIRUB UR-MCNC: NEGATIVE — SIGNIFICANT CHANGE UP
BUN SERPL-MCNC: 19 MG/DL — SIGNIFICANT CHANGE UP (ref 7–23)
CALCIUM SERPL-MCNC: 9.4 MG/DL — SIGNIFICANT CHANGE UP (ref 8.4–10.5)
CHLORIDE SERPL-SCNC: 100 MMOL/L — SIGNIFICANT CHANGE UP (ref 98–107)
CO2 SERPL-SCNC: 23 MMOL/L — SIGNIFICANT CHANGE UP (ref 22–31)
COLOR SPEC: COLORLESS — SIGNIFICANT CHANGE UP
CREAT SERPL-MCNC: 1 MG/DL — SIGNIFICANT CHANGE UP (ref 0.5–1.3)
DIFF PNL FLD: ABNORMAL
EGFR: 82 ML/MIN/1.73M2 — SIGNIFICANT CHANGE UP
EOSINOPHIL # BLD AUTO: 0.01 K/UL — SIGNIFICANT CHANGE UP (ref 0–0.5)
EOSINOPHIL NFR BLD AUTO: 0.1 % — SIGNIFICANT CHANGE UP (ref 0–6)
EPI CELLS # UR: 1 /HPF — SIGNIFICANT CHANGE UP (ref 0–5)
GLUCOSE SERPL-MCNC: 114 MG/DL — HIGH (ref 70–99)
GLUCOSE UR QL: NEGATIVE — SIGNIFICANT CHANGE UP
HCT VFR BLD CALC: 33.3 % — LOW (ref 39–50)
HGB BLD-MCNC: 10.4 G/DL — LOW (ref 13–17)
HYALINE CASTS # UR AUTO: 2 /LPF — SIGNIFICANT CHANGE UP (ref 0–7)
IANC: 6.87 K/UL — SIGNIFICANT CHANGE UP (ref 1.8–7.4)
IMM GRANULOCYTES NFR BLD AUTO: 0.7 % — SIGNIFICANT CHANGE UP (ref 0–0.9)
KETONES UR-MCNC: NEGATIVE — SIGNIFICANT CHANGE UP
LEUKOCYTE ESTERASE UR-ACNC: ABNORMAL
LIDOCAIN IGE QN: 58 U/L — SIGNIFICANT CHANGE UP (ref 7–60)
LYMPHOCYTES # BLD AUTO: 1.19 K/UL — SIGNIFICANT CHANGE UP (ref 1–3.3)
LYMPHOCYTES # BLD AUTO: 13.2 % — SIGNIFICANT CHANGE UP (ref 13–44)
MAGNESIUM SERPL-MCNC: 2.1 MG/DL — SIGNIFICANT CHANGE UP (ref 1.6–2.6)
MCHC RBC-ENTMCNC: 25.2 PG — LOW (ref 27–34)
MCHC RBC-ENTMCNC: 31.2 GM/DL — LOW (ref 32–36)
MCV RBC AUTO: 80.8 FL — SIGNIFICANT CHANGE UP (ref 80–100)
MONOCYTES # BLD AUTO: 0.84 K/UL — SIGNIFICANT CHANGE UP (ref 0–0.9)
MONOCYTES NFR BLD AUTO: 9.3 % — SIGNIFICANT CHANGE UP (ref 2–14)
NEUTROPHILS # BLD AUTO: 6.87 K/UL — SIGNIFICANT CHANGE UP (ref 1.8–7.4)
NEUTROPHILS NFR BLD AUTO: 76.3 % — SIGNIFICANT CHANGE UP (ref 43–77)
NITRITE UR-MCNC: NEGATIVE — SIGNIFICANT CHANGE UP
NRBC # BLD: 0 /100 WBCS — SIGNIFICANT CHANGE UP (ref 0–0)
NRBC # FLD: 0 K/UL — SIGNIFICANT CHANGE UP (ref 0–0)
PH UR: 6.5 — SIGNIFICANT CHANGE UP (ref 5–8)
PHOSPHATE SERPL-MCNC: 3.1 MG/DL — SIGNIFICANT CHANGE UP (ref 2.5–4.5)
PLATELET # BLD AUTO: 400 K/UL — SIGNIFICANT CHANGE UP (ref 150–400)
POTASSIUM SERPL-MCNC: 4.2 MMOL/L — SIGNIFICANT CHANGE UP (ref 3.5–5.3)
POTASSIUM SERPL-SCNC: 4.2 MMOL/L — SIGNIFICANT CHANGE UP (ref 3.5–5.3)
PROT SERPL-MCNC: 7.2 G/DL — SIGNIFICANT CHANGE UP (ref 6–8.3)
PROT UR-MCNC: NEGATIVE — SIGNIFICANT CHANGE UP
RBC # BLD: 4.12 M/UL — LOW (ref 4.2–5.8)
RBC # FLD: 15.8 % — HIGH (ref 10.3–14.5)
RBC CASTS # UR COMP ASSIST: 2 /HPF — SIGNIFICANT CHANGE UP (ref 0–4)
SODIUM SERPL-SCNC: 136 MMOL/L — SIGNIFICANT CHANGE UP (ref 135–145)
SP GR SPEC: 1 — LOW (ref 1.01–1.05)
TROPONIN T, HIGH SENSITIVITY RESULT: 56 NG/L — CRITICAL HIGH
UROBILINOGEN FLD QL: SIGNIFICANT CHANGE UP
WBC # BLD: 9.01 K/UL — SIGNIFICANT CHANGE UP (ref 3.8–10.5)
WBC # FLD AUTO: 9.01 K/UL — SIGNIFICANT CHANGE UP (ref 3.8–10.5)
WBC UR QL: 18 /HPF — HIGH (ref 0–5)

## 2023-05-27 PROCEDURE — 93010 ELECTROCARDIOGRAM REPORT: CPT

## 2023-05-27 PROCEDURE — 99285 EMERGENCY DEPT VISIT HI MDM: CPT

## 2023-05-27 PROCEDURE — 93010 ELECTROCARDIOGRAM REPORT: CPT | Mod: 77

## 2023-05-27 PROCEDURE — 71046 X-RAY EXAM CHEST 2 VIEWS: CPT | Mod: 26

## 2023-05-27 RX ORDER — FAMOTIDINE 10 MG/ML
20 INJECTION INTRAVENOUS ONCE
Refills: 0 | Status: COMPLETED | OUTPATIENT
Start: 2023-05-27 | End: 2023-05-27

## 2023-05-27 RX ORDER — CEFDINIR 250 MG/5ML
1 POWDER, FOR SUSPENSION ORAL
Qty: 20 | Refills: 0
Start: 2023-05-27 | End: 2023-06-05

## 2023-05-27 RX ORDER — TICAGRELOR 90 MG/1
90 TABLET ORAL ONCE
Refills: 0 | Status: COMPLETED | OUTPATIENT
Start: 2023-05-27 | End: 2023-05-27

## 2023-05-27 RX ORDER — ONDANSETRON 8 MG/1
1 TABLET, FILM COATED ORAL
Qty: 9 | Refills: 0
Start: 2023-05-27 | End: 2023-05-29

## 2023-05-27 RX ORDER — CEFTRIAXONE 500 MG/1
1000 INJECTION, POWDER, FOR SOLUTION INTRAMUSCULAR; INTRAVENOUS ONCE
Refills: 0 | Status: COMPLETED | OUTPATIENT
Start: 2023-05-27 | End: 2023-05-27

## 2023-05-27 RX ADMIN — CEFTRIAXONE 100 MILLIGRAM(S): 500 INJECTION, POWDER, FOR SOLUTION INTRAMUSCULAR; INTRAVENOUS at 21:22

## 2023-05-27 RX ADMIN — FAMOTIDINE 20 MILLIGRAM(S): 10 INJECTION INTRAVENOUS at 20:05

## 2023-05-27 RX ADMIN — Medication 30 MILLILITER(S): at 19:56

## 2023-05-27 RX ADMIN — TICAGRELOR 90 MILLIGRAM(S): 90 TABLET ORAL at 22:26

## 2023-05-27 NOTE — ED PROVIDER NOTE - CLINICAL SUMMARY MEDICAL DECISION MAKING FREE TEXT BOX
66 y/o F w/ pmhx sig CAD s/p stent placement  in 2012 And LAD  stent placed on May 22, HTN, HLD, GERD and BPH s/p TURP  presenting to the ED with diffuse abdominal  burning with associated nausea.  Worse after eating and moving.  Has had the symptoms for 6 weeks status post TURP.  No urinary or bowel changes increased gas.   Stent placed to right radial artery.  Vital stable.   Exam with no CVA tenderness, no abdominal chest palpation, no rashes, clear lungs auscultation, no reproducible pain.  likely GERD versus gastritis.  Patient ha CT angio  chest and abdomen pelvis on previous visit when he came in with similar symptoms and no acute findings that would explain current symptoms.  We will give Maalox and Pepcid, will check labs.  Likely discharge with GI follow-up

## 2023-05-27 NOTE — ED PROVIDER NOTE - PROGRESS NOTE DETAILS
Marcel Platt MD (PGY2):  UA with UTI.  Will give ceftriaxone.  Cefdinir sent to pharmacy for 10 days.  Troponin downtrending.  Lab work otherwise nonactionable.  Patient to be discharged with PMD and cardiology follow-up.  Strict return  precautions to be discussed.

## 2023-05-27 NOTE — ED ADULT TRIAGE NOTE - CHIEF COMPLAINT QUOTE
Pt arrives via EMS c/o bilat upper flank pain, dizziness, and nausea since 08:00 AM. Cardiac stent placed on Monday. RR even and unlabored. Given 8mg Zofran PTA via 20g IV to L hand. Arrives on 2L NC for comfort. PMH: BPH, HLD, HTN, kidney stones, MI, x2 cardiac stents.

## 2023-05-27 NOTE — ED PROVIDER NOTE - NS ED ROS FT
CONSTITUTIONAL: No fevers, no chills, no lightheadedness, no dizziness  EYES: no visual changes, no eye pain  EARS: no ear drainage, no ear pain, no change in hearing  NOSE: no nasal congestion  MOUTH/THROAT: no sore throat  CV: No chest pain, no palpitations  RESP: No SOB, no cough  GI: see hpi  : no dysuria, no hematuria, no flank pain  MSK: no back pain, no extremity pain  SKIN: no rashes  NEURO: no headache, no focal weakness, no decreased sensation/parasthesias   PSYCHIATRIC: no known mental health issues

## 2023-05-27 NOTE — ED ADULT NURSE NOTE - NSFALLUNIVINTERV_ED_ALL_ED
Bed/Stretcher in lowest position, wheels locked, appropriate side rails in place/Call bell, personal items and telephone in reach/Instruct patient to call for assistance before getting out of bed/chair/stretcher/Non-slip footwear applied when patient is off stretcher/[place] to call system/Physically safe environment - no spills, clutter or unnecessary equipment/Purposeful proactive rounding/Room/bathroom lighting operational, light cord in reach

## 2023-05-27 NOTE — ED PROVIDER NOTE - ATTENDING CONTRIBUTION TO CARE
67M h/o CAD recent adm for NSTEMI s/p stent to LAD.  S/p turp a few weeks ago.  Coming in for burning sensation to abd and bilat flank, worse with eating or moving.  Took omeprazole w/o impv.  Last week similar presentation, except last week had syncopal episode which led to NSTEMI dx.  Unsure what brings it on.  Normal urination.   Reports a lot of flatus.  Nontender abd.  No CVAT.  Lungs clear.   Plan check labs, rx pepcid, maalox.  Pt rec'd zofran en route and nausea improved.  EKG SR at 91 bifascicular block with aberrancy.   qtc 479.  Found to have UTI.  Rx abx, OK for d/c home f/u PMD.  Trop elevated here but downtrending from a few days ago.    VS:  unremarkable    GEN - NAD;  malaise;   A+O x3   HEAD - NC/AT     ENT - PEERL, EOMI, mucous membranes    moist , no discharge      NECK: Neck supple, non-tender without lymphadenopathy, no masses, no JVD  PULM - CTA b/l,  symmetric breath sounds  COR -  normal heart sounds    ABD - , ND, NT, soft,  BACK - no CVA tenderness, nontender spine     EXTREMS - no edema, no deformity, warm and well perfused    SKIN - no rash    or bruising      NEUROLOGIC - alert, face symmetric, speech fluent, sensation nl, motor no focal deficit.

## 2023-05-27 NOTE — ED PROVIDER NOTE - PATIENT PORTAL LINK FT
You can access the FollowMyHealth Patient Portal offered by [facility] by registering at the following website: [email or web address]. By joining [employer]’s FollowMyHealth portal, you will also be able to view your health information using other applications (apps) compatible with our system.

## 2023-05-27 NOTE — ED PROVIDER NOTE - PHYSICAL EXAMINATION
General: Alert and Orientated x 3. No apparent distress.  Head: Normocephalic and atraumatic.  Eyes: PERRLA with EOMI.  Neck: Supple. Trachea midline.   Cardiac: Normal S1 and S2 w/ RRR. No murmurs appreciated. No JVD appreciated.  Pulmonary: CTA bilaterally. No increased WOB. No wheezes or crackles.  Abdominal: Soft,   Nondistended, nontender(+) bowel sounds appreciated in all 4 quadrants. No hepatosplenomegaly.  no CVA tenderness  Neurologic: No focal sensory or motor deficits.  Musculoskeletal: Strength appropriate in all 4 extremities for age with no limited ROM.  Skin: Color appropriate for race. Intact, warm, and well-perfused.  Psychiatric: Appropriate mood and affect. No apparent risk to self or others.

## 2023-05-27 NOTE — ED PROVIDER NOTE - NSFOLLOWUPINSTRUCTIONS_ED_ALL_ED_FT
You were seen in the Emergency Department for  flank pain. Lab and imaging results, if performed, were discussed with you along with your discharge diagnosis.    Follow-up with your cardiologist. Follow up with your doctor in 1 week - bring copies of your results if you were given. If you do not have a primary doctor, please call [phone] to find one convenient for you.    A prescription for Cefdinir 300 mg twice a day for 10 days  was sent to your pharmacy. Take as prescribed, Continue all prescribed medications.     To control your pain at home, you should take Ibuprofen 400 mg along with Tylenol 650mg-1000mg every 6 to 8 hours. Limit your maximum daily Tylenol from all sources to 4000mg. Be aware that many other medications contain acetaminophen which is also known as Tylenol. Taking Tylenol and Ibuprofen together has been shown to be more effective at relieving pain than taking them separately. These are both over the counter medications that you can  at your local pharmacy without a prescription. You need to respect all of the warnings on the bottles. You shouldn’t take these medications for more than a week without following up with your doctor. Both medications come with certain risks and side effects that you need to discuss with your doctor, especially if you are taking them for a prolonged period.    Return to ED for any new or worsening symptoms including but not limited to: development of chest pain, shortness of breath, fever, vomiting, focal numbness, weakness or tingling, any severe CP, headache, abdominal pain, back pain.      Rest and keep yourself hydrated with fluids.

## 2023-05-27 NOTE — ED PROVIDER NOTE - OBJECTIVE STATEMENT
Patient is a 67-year-old male with history of CAD s/p stent placement  in 2012 and PCI to LAD on  May 22, HTN, HLD, GERD and BPH s/p TURP  presenting to the emergency department with  diffuse abdominal burning that occurs after eating and when moving.  States symptoms started after TURP 6 weeks ago.  Nausea but no vomiting.  Occurs daily.  Patient takes omeprazole daily.  Patient was seen in this ED last week after having syncopal episode and persistent epigastric pain and was found to have an NSTEMI.  Status post cath.   no fever, chills, urinary or bowel changes.  He is having increased flatulence.  Received Zofran in route and says nausea has improved.

## 2023-05-27 NOTE — ED CLERICAL - NS ED CLERK NOTE PRE-ARRIVAL INFORMATION; ADDITIONAL PRE-ARRIVAL INFORMATION
This patient is enrolled in the STARS readmission program and has active care navigation.      -This patient can be followed up by the care navigation team within 24 hours. To arrange close follow-up or to obtain additional clinical information about this patient, please call the contact number above.    -Please consider CDU for management if appropriate.

## 2023-05-28 LAB
CULTURE RESULTS: SIGNIFICANT CHANGE UP
SPECIMEN SOURCE: SIGNIFICANT CHANGE UP

## 2023-05-28 NOTE — HISTORY OF PRESENT ILLNESS
[FreeTextEntry1] : referred for evaluation of an elevated PSA and Lts.\par notes long h/o later; as high a 11 and had a negative biopsy @6 years ago. \par Also had MRI within 2 years - thinks it was negative but doesn't know details.\par PSA now 8.2 27% \par \par also has LUTs. Has been on Tamsulosin for years and finasteride for 6 months. \par He notes frequency with urgency and can have leakage; uses a bottle in car. Has nocturia 4-5 times. The FOS is slower and can push to start and has intermittency. Remote h/o retention. \par No hematuria or UTI symptoms.\par PVR 85\par \par 3/23 - had him double up the tamsulosin - not much difference other than improved urgency \par MRI notes 126cc prostate and NO suspicious lesions. \par \par 5/23 s/p TURP - 50cc BPH\par voiding very well with strong FOS and markedly less frequency urgency and nocturia - 0-1.\par had minor heart attack 2 weeks post op and has cardiac stents

## 2023-05-30 ENCOUNTER — TRANSCRIPTION ENCOUNTER (OUTPATIENT)
Age: 68
End: 2023-05-30

## 2023-06-02 ENCOUNTER — INPATIENT (INPATIENT)
Facility: HOSPITAL | Age: 68
LOS: 0 days | Discharge: LEFT BEFORE DISCHARGE | End: 2023-06-03
Attending: HOSPITALIST | Admitting: HOSPITALIST
Payer: MEDICARE

## 2023-06-02 ENCOUNTER — APPOINTMENT (OUTPATIENT)
Dept: CARDIOLOGY | Facility: CLINIC | Age: 68
End: 2023-06-02
Payer: MEDICARE

## 2023-06-02 ENCOUNTER — TRANSCRIPTION ENCOUNTER (OUTPATIENT)
Age: 68
End: 2023-06-02

## 2023-06-02 VITALS
HEIGHT: 68.5 IN | OXYGEN SATURATION: 100 % | HEART RATE: 104 BPM | RESPIRATION RATE: 18 BRPM | DIASTOLIC BLOOD PRESSURE: 65 MMHG | SYSTOLIC BLOOD PRESSURE: 117 MMHG | TEMPERATURE: 98 F

## 2023-06-02 VITALS
OXYGEN SATURATION: 99 % | HEIGHT: 70 IN | SYSTOLIC BLOOD PRESSURE: 129 MMHG | BODY MASS INDEX: 23.67 KG/M2 | WEIGHT: 165.34 LBS | DIASTOLIC BLOOD PRESSURE: 82 MMHG | RESPIRATION RATE: 17 BRPM | HEART RATE: 105 BPM | TEMPERATURE: 99 F

## 2023-06-02 VITALS
OXYGEN SATURATION: 98 % | RESPIRATION RATE: 28 BRPM | HEART RATE: 114 BPM | TEMPERATURE: 97.9 F | SYSTOLIC BLOOD PRESSURE: 105 MMHG | DIASTOLIC BLOOD PRESSURE: 66 MMHG

## 2023-06-02 DIAGNOSIS — Z86.010 PERSONAL HISTORY OF COLONIC POLYPS: ICD-10-CM

## 2023-06-02 DIAGNOSIS — N20.0 CALCULUS OF KIDNEY: Chronic | ICD-10-CM

## 2023-06-02 DIAGNOSIS — Z87.09 PERSONAL HISTORY OF OTHER DISEASES OF THE RESPIRATORY SYSTEM: ICD-10-CM

## 2023-06-02 DIAGNOSIS — Z01.818 ENCOUNTER FOR OTHER PREPROCEDURAL EXAMINATION: ICD-10-CM

## 2023-06-02 DIAGNOSIS — Z04.9 ENCOUNTER FOR EXAMINATION AND OBSERVATION FOR UNSPECIFIED REASON: ICD-10-CM

## 2023-06-02 LAB
ALBUMIN SERPL ELPH-MCNC: 4.5 G/DL — SIGNIFICANT CHANGE UP (ref 3.3–5)
ALP SERPL-CCNC: 63 U/L — SIGNIFICANT CHANGE UP (ref 40–120)
ALT FLD-CCNC: 13 U/L — SIGNIFICANT CHANGE UP (ref 4–41)
ANION GAP SERPL CALC-SCNC: 12 MMOL/L — SIGNIFICANT CHANGE UP (ref 7–14)
AST SERPL-CCNC: 16 U/L — SIGNIFICANT CHANGE UP (ref 4–40)
BASOPHILS # BLD AUTO: 0.03 K/UL — SIGNIFICANT CHANGE UP (ref 0–0.2)
BASOPHILS NFR BLD AUTO: 0.2 % — SIGNIFICANT CHANGE UP (ref 0–2)
BILIRUB SERPL-MCNC: 0.4 MG/DL — SIGNIFICANT CHANGE UP (ref 0.2–1.2)
BUN SERPL-MCNC: 20 MG/DL — SIGNIFICANT CHANGE UP (ref 7–23)
CALCIUM SERPL-MCNC: 9.1 MG/DL — SIGNIFICANT CHANGE UP (ref 8.4–10.5)
CHLORIDE SERPL-SCNC: 99 MMOL/L — SIGNIFICANT CHANGE UP (ref 98–107)
CO2 SERPL-SCNC: 24 MMOL/L — SIGNIFICANT CHANGE UP (ref 22–31)
CREAT SERPL-MCNC: 1.08 MG/DL — SIGNIFICANT CHANGE UP (ref 0.5–1.3)
EGFR: 75 ML/MIN/1.73M2 — SIGNIFICANT CHANGE UP
EOSINOPHIL # BLD AUTO: 0 K/UL — SIGNIFICANT CHANGE UP (ref 0–0.5)
EOSINOPHIL NFR BLD AUTO: 0 % — SIGNIFICANT CHANGE UP (ref 0–6)
GLUCOSE SERPL-MCNC: 113 MG/DL — HIGH (ref 70–99)
HCT VFR BLD CALC: 32.4 % — LOW (ref 39–50)
HGB BLD-MCNC: 9.9 G/DL — LOW (ref 13–17)
IANC: 10.78 K/UL — HIGH (ref 1.8–7.4)
IMM GRANULOCYTES NFR BLD AUTO: 0.5 % — SIGNIFICANT CHANGE UP (ref 0–0.9)
LYMPHOCYTES # BLD AUTO: 0.83 K/UL — LOW (ref 1–3.3)
LYMPHOCYTES # BLD AUTO: 6.5 % — LOW (ref 13–44)
MAGNESIUM SERPL-MCNC: 2.2 MG/DL — SIGNIFICANT CHANGE UP (ref 1.6–2.6)
MCHC RBC-ENTMCNC: 24.1 PG — LOW (ref 27–34)
MCHC RBC-ENTMCNC: 30.6 GM/DL — LOW (ref 32–36)
MCV RBC AUTO: 78.8 FL — LOW (ref 80–100)
MONOCYTES # BLD AUTO: 1.04 K/UL — HIGH (ref 0–0.9)
MONOCYTES NFR BLD AUTO: 8.2 % — SIGNIFICANT CHANGE UP (ref 2–14)
NEUTROPHILS # BLD AUTO: 10.78 K/UL — HIGH (ref 1.8–7.4)
NEUTROPHILS NFR BLD AUTO: 84.6 % — HIGH (ref 43–77)
NRBC # BLD: 0 /100 WBCS — SIGNIFICANT CHANGE UP (ref 0–0)
NRBC # FLD: 0 K/UL — SIGNIFICANT CHANGE UP (ref 0–0)
PHOSPHATE SERPL-MCNC: 2.6 MG/DL — SIGNIFICANT CHANGE UP (ref 2.5–4.5)
PLATELET # BLD AUTO: 522 K/UL — HIGH (ref 150–400)
POTASSIUM SERPL-MCNC: 3.9 MMOL/L — SIGNIFICANT CHANGE UP (ref 3.5–5.3)
POTASSIUM SERPL-SCNC: 3.9 MMOL/L — SIGNIFICANT CHANGE UP (ref 3.5–5.3)
PROT SERPL-MCNC: 6.6 G/DL — SIGNIFICANT CHANGE UP (ref 6–8.3)
RBC # BLD: 4.11 M/UL — LOW (ref 4.2–5.8)
RBC # FLD: 16 % — HIGH (ref 10.3–14.5)
SODIUM SERPL-SCNC: 135 MMOL/L — SIGNIFICANT CHANGE UP (ref 135–145)
TROPONIN T, HIGH SENSITIVITY RESULT: 14 NG/L — SIGNIFICANT CHANGE UP
TROPONIN T, HIGH SENSITIVITY RESULT: 17 NG/L — SIGNIFICANT CHANGE UP
WBC # BLD: 12.74 K/UL — HIGH (ref 3.8–10.5)
WBC # FLD AUTO: 12.74 K/UL — HIGH (ref 3.8–10.5)

## 2023-06-02 PROCEDURE — 99215 OFFICE O/P EST HI 40 MIN: CPT

## 2023-06-02 PROCEDURE — 93000 ELECTROCARDIOGRAM COMPLETE: CPT | Mod: PD

## 2023-06-02 PROCEDURE — 99285 EMERGENCY DEPT VISIT HI MDM: CPT

## 2023-06-02 RX ORDER — SODIUM CHLORIDE 9 MG/ML
500 INJECTION, SOLUTION INTRAVENOUS ONCE
Refills: 0 | Status: DISCONTINUED | OUTPATIENT
Start: 2023-06-02 | End: 2023-06-02

## 2023-06-02 RX ORDER — SODIUM CHLORIDE 9 MG/ML
1000 INJECTION, SOLUTION INTRAVENOUS ONCE
Refills: 0 | Status: COMPLETED | OUTPATIENT
Start: 2023-06-02 | End: 2023-06-02

## 2023-06-02 RX ORDER — CEFPODOXIME PROXETIL 100 MG
200 TABLET ORAL ONCE
Refills: 0 | Status: COMPLETED | OUTPATIENT
Start: 2023-06-02 | End: 2023-06-02

## 2023-06-02 RX ORDER — LOSARTAN POTASSIUM 100 MG/1
100 TABLET, FILM COATED ORAL
Refills: 0 | Status: COMPLETED | COMMUNITY
End: 2023-06-02

## 2023-06-02 RX ORDER — TICAGRELOR 90 MG/1
90 TABLET ORAL ONCE
Refills: 0 | Status: COMPLETED | OUTPATIENT
Start: 2023-06-02 | End: 2023-06-02

## 2023-06-02 RX ORDER — SODIUM CHLORIDE 9 MG/ML
2000 INJECTION, SOLUTION INTRAVENOUS ONCE
Refills: 0 | Status: COMPLETED | OUTPATIENT
Start: 2023-06-02 | End: 2023-06-02

## 2023-06-02 RX ADMIN — TICAGRELOR 90 MILLIGRAM(S): 90 TABLET ORAL at 22:50

## 2023-06-02 RX ADMIN — Medication 200 MILLIGRAM(S): at 22:51

## 2023-06-02 RX ADMIN — SODIUM CHLORIDE 2000 MILLILITER(S): 9 INJECTION, SOLUTION INTRAVENOUS at 21:12

## 2023-06-02 NOTE — ED PROVIDER NOTE - PHYSICAL EXAMINATION
VITALS:   T(C): 36.8 (06-02-23 @ 18:10), Max: 36.8 (06-02-23 @ 18:10)  HR: 99 (06-02-23 @ 19:17) (99 - 104)  BP: 125/65 (06-02-23 @ 19:17) (117/65 - 125/65)  RR: 18 (06-02-23 @ 19:17) (18 - 18)  SpO2: 100% (06-02-23 @ 19:17) (100% - 100%)    PHYSICAL EXAM:     GENERAL: NAD, lying in bed comfortably  HEAD:  Atraumatic, Normocephalic  EYES: EOMI, PERRLA, conjunctiva and sclera clear  ENT: Dry mucous membranes  NECK: Supple, No JVD  CHEST/LUNG: Clear to auscultation bilaterally; No rales, rhonchi, wheezing, or rubs. Unlabored respirations  HEART: Regular rate and rhythm; No murmurs, rubs, or gallops  ABDOMEN: normal bowel sounds; Soft, nontender, nondistended  EXTREMITIES:  2+ Peripheral Pulses, brisk capillary refill. No clubbing, cyanosis, or edema  Neurological:  A&Ox3, no focal deficits   SKIN: No rashes or lesions  PSYCH: normal affect and mood

## 2023-06-02 NOTE — ED PROVIDER NOTE - CARE PLAN
Principal Discharge DX:	Orthostatic hypotension  Secondary Diagnosis:	Supraventricular tachycardia   1

## 2023-06-02 NOTE — ED ADULT TRIAGE NOTE - CHIEF COMPLAINT QUOTE
c/o sudden onset c/p and diaphoresis while at cardiologist office, per EMS pt shows ST segment elevation on lead III on monitor, pt with cardiac stents in the past, BPH, GERD, HLD.

## 2023-06-02 NOTE — ED PROVIDER NOTE - PROGRESS NOTE DETAILS
Patient orthostatics taken with systolic BP dropping by 20 and tachycardia to 150. Will order 500 bolus LR Pt received in signout from Dr Fox  The patient is a 67y Male patient of Dr Garcia who has a past medical and surgery history of HTN HLD BPH CAD/LAD stent GERD Kidney stones palpitations on Zio patch for same PTED with palpitations as described In USOH otherwise   Vital Signs Last 24 Hrs  T(F): 98.3 HR: 127 BP: 149/84 BP(mean): 97 (03 Jun 2023 01:10) (85 - 97)  RR: 17 (03 Jun 2023 01:10) (17 - 22)  PE : as described; my additions and exceptions are noted in the chart    DATA:  EKG: NSR@100; RBBB/LAFB; unchanged from 6/2/2023 EKG    LAB: Pending at time of evaluation                        9.9    12.74 )-----------( 522      ( 02 Jun 2023 19:57 )             32.4   06-02    135  |  99  |  20  ----------------------------<  113<H>  3.9   |  24  |  1.08    Ca    9.1      02 Jun 2023 19:57  Phos  2.6     06-02  Mg     2.20     06-02    TPro  6.6  /  Alb  4.5  /  TBili  0.4  /  DBili  x   /  AST  16  /  ALT  13  /  AlkPhos  63  06-02     Troponin T, High Sensitivity Result: 17 ng/L (06-02-23 @ 22:33)  Troponin T, High Sensitivity Result: 14 ng/L (06-02-23 @ 19:57)   CXR: MEET/E dilated Colon  IMPRESSION/RISK:  Dx=  Palpitations   Consideration include no chest pain associated with presentation or IVFs no c/o SOB and CXR negative no peripheral edema; orthostasis prob 2/2 autonomic neuropathy + afterload redux but will need ECHO to r/o significant EF changes or valvular dz   would hold exchange procardia for other agent given propensity of reflex tachycardia/orthostasis   episode of SVT noted while on telemetry EPS to see inpt   Plan  As above  Admit: CDU vs full tele admit based on availability

## 2023-06-02 NOTE — ED ADULT NURSE NOTE - OBJECTIVE STATEMENT
Covering RN NoteJMP Pt received with wife at bedside. Pt axox4, calm affect, Ptst" I was told to go to ER by my Cardiologist I was there today for a follow up appt I had a stent placed aweek ago and while there I got dizzy and felt sweaty lasted minute so was told I may be dehydrated . " As per Wife he was also being treated for UTI is on antibioticis.

## 2023-06-02 NOTE — ED ADULT NURSE NOTE - NSFALLUNIVINTERV_ED_ALL_ED
Bed/Stretcher in lowest position, wheels locked, appropriate side rails in place/Call bell, personal items and telephone in reach/Instruct patient to call for assistance before getting out of bed/chair/stretcher/Non-slip footwear applied when patient is off stretcher/[place] to call system/Physically safe environment - no spills, clutter or unnecessary equipment/Purposeful proactive rounding/Room/bathroom lighting operational, light cord in reach

## 2023-06-02 NOTE — ED PROVIDER NOTE - OBJECTIVE STATEMENT
67-year-old male with history of CAD s/p stent placement  in 2012 and PCI to LAD on  May 22, HTN, HLD, GERD and BPH s/p TURP  presenting to the emergency department with back pain as well as lightheadedness. Patient was at his cardiologist office earlier today (Dr. Garcia) and was sent in for feeling dizzy and tachycardic. Patient describes the sensation as lightheadedness. Patient otherwise denies chest pain, fevers, abdominal pain, dysuria, hematuria, diarrhea. He has nausea but denies vomiting. Patient has been medically compliant including his dapt. He has occasional sensation of palpitations at night but denies orthopnea, paroxysmal nocturnal dyspnea, b/l LE edema. He has back pain in his lower back which he has had chronically for 1 year but has been worsening after his TURP and worsens when he is laying down in bed with improvement when standing and moving around. He says he has been in bed for longer periods since his TURP.

## 2023-06-02 NOTE — ED ADULT TRIAGE NOTE - MEANS OF ARRIVAL
stretcher GENERAL: well appearing, no acute distress, NC in place   HEAD: atraumatic   EYES: EOMI   ENT: moist oral mucosa   CARDIAC: regular rate, no edema, ext warm and well perfused, LUE AV fistula    RESPIRATORY: no increased work of breathing, lungs clear  ABDO: soft, mild diffuse ttp   MUSCULOSKELETAL: no deformity   NEUROLOGICAL: alert, spontaneous movement of extremities   SKIN: no visible rash  PSYCHIATRIC: cooperative

## 2023-06-02 NOTE — ED PROVIDER NOTE - CLINICAL SUMMARY MEDICAL DECISION MAKING FREE TEXT BOX
67-year-old male with history of CAD s/p stent placement  in 2012 and PCI to LAD on  May 22, HTN, HLD, GERD and BPH s/p TURP  presenting to the emergency department with back pain as well as lightheadedness. Back pain appears to be musculoskeletal with low suspicion for pyelonephritis as the pain is on both sides and patient does not appear toxic or have ROS suggestive of complicated UTI. He has dry oral mucosa on physical exam which may explain his tachycardia and dizziness although his recent NSTEMI places him at higher risk for possible cardiac etiology.  - Will obtain CBC, CMP, troponin  - Will obtain EKG  - Will obtain orthostatics

## 2023-06-02 NOTE — ED ADULT NURSE NOTE - CCCP TRG CHIEF CMPLNT
Lizzeth Castillo  is a 31 y o  [place] who presents for early ultrasound  Single IUP @ 8w6d consistent with LMP of 1/12/21 and MAGGY of 10/19/21 (9w2d by dates)  Planned pregnancy  Having nausea and breast tenderness  Will schedule OB intake and prenatal one  Encouraged to continue PNV  Discussed avoiding teratogens  S/P Flu vaccine  Written information given on COVID vaccine  To notify us with any bleeding or pelvic pain  Verbalized understanding  chest pain

## 2023-06-03 ENCOUNTER — TRANSCRIPTION ENCOUNTER (OUTPATIENT)
Age: 68
End: 2023-06-03

## 2023-06-03 VITALS
HEART RATE: 69 BPM | TEMPERATURE: 99 F | SYSTOLIC BLOOD PRESSURE: 131 MMHG | DIASTOLIC BLOOD PRESSURE: 70 MMHG | OXYGEN SATURATION: 100 % | RESPIRATION RATE: 18 BRPM

## 2023-06-03 DIAGNOSIS — I47.1 SUPRAVENTRICULAR TACHYCARDIA: ICD-10-CM

## 2023-06-03 DIAGNOSIS — K21.9 GASTRO-ESOPHAGEAL REFLUX DISEASE WITHOUT ESOPHAGITIS: ICD-10-CM

## 2023-06-03 DIAGNOSIS — I10 ESSENTIAL (PRIMARY) HYPERTENSION: ICD-10-CM

## 2023-06-03 DIAGNOSIS — I25.10 ATHEROSCLEROTIC HEART DISEASE OF NATIVE CORONARY ARTERY WITHOUT ANGINA PECTORIS: ICD-10-CM

## 2023-06-03 DIAGNOSIS — J45.909 UNSPECIFIED ASTHMA, UNCOMPLICATED: ICD-10-CM

## 2023-06-03 DIAGNOSIS — Z29.9 ENCOUNTER FOR PROPHYLACTIC MEASURES, UNSPECIFIED: ICD-10-CM

## 2023-06-03 DIAGNOSIS — I95.1 ORTHOSTATIC HYPOTENSION: ICD-10-CM

## 2023-06-03 DIAGNOSIS — R00.2 PALPITATIONS: ICD-10-CM

## 2023-06-03 DIAGNOSIS — N39.0 URINARY TRACT INFECTION, SITE NOT SPECIFIED: ICD-10-CM

## 2023-06-03 DIAGNOSIS — D50.9 IRON DEFICIENCY ANEMIA, UNSPECIFIED: ICD-10-CM

## 2023-06-03 PROBLEM — Z86.010 HISTORY OF COLON POLYPS: Status: RESOLVED | Noted: 2021-06-25 | Resolved: 2023-06-03

## 2023-06-03 PROBLEM — Z01.818 PREOP TESTING: Status: RESOLVED | Noted: 2021-02-27 | Resolved: 2023-06-03

## 2023-06-03 PROBLEM — Z87.09 HISTORY OF ASTHMA: Status: RESOLVED | Noted: 2021-03-17 | Resolved: 2023-06-03

## 2023-06-03 LAB
ANION GAP SERPL CALC-SCNC: 12 MMOL/L — SIGNIFICANT CHANGE UP (ref 7–14)
APPEARANCE UR: CLEAR — SIGNIFICANT CHANGE UP
BACTERIA # UR AUTO: NEGATIVE — SIGNIFICANT CHANGE UP
BILIRUB UR-MCNC: NEGATIVE — SIGNIFICANT CHANGE UP
BUN SERPL-MCNC: 13 MG/DL — SIGNIFICANT CHANGE UP (ref 7–23)
CALCIUM SERPL-MCNC: 9 MG/DL — SIGNIFICANT CHANGE UP (ref 8.4–10.5)
CHLORIDE SERPL-SCNC: 105 MMOL/L — SIGNIFICANT CHANGE UP (ref 98–107)
CO2 SERPL-SCNC: 23 MMOL/L — SIGNIFICANT CHANGE UP (ref 22–31)
COLOR SPEC: COLORLESS — SIGNIFICANT CHANGE UP
CREAT SERPL-MCNC: 0.91 MG/DL — SIGNIFICANT CHANGE UP (ref 0.5–1.3)
DIFF PNL FLD: ABNORMAL
EGFR: 92 ML/MIN/1.73M2 — SIGNIFICANT CHANGE UP
EPI CELLS # UR: 1 /HPF — SIGNIFICANT CHANGE UP (ref 0–5)
FERRITIN SERPL-MCNC: 8 NG/ML — LOW (ref 30–400)
GLUCOSE SERPL-MCNC: 105 MG/DL — HIGH (ref 70–99)
GLUCOSE UR QL: NEGATIVE — SIGNIFICANT CHANGE UP
HCT VFR BLD CALC: 31.1 % — LOW (ref 39–50)
HGB BLD-MCNC: 9.3 G/DL — LOW (ref 13–17)
IRON SATN MFR SERPL: 12 UG/DL — LOW (ref 45–165)
IRON SATN MFR SERPL: 4 % — LOW (ref 14–50)
KETONES UR-MCNC: NEGATIVE — SIGNIFICANT CHANGE UP
LEUKOCYTE ESTERASE UR-ACNC: ABNORMAL
MAGNESIUM SERPL-MCNC: 2.2 MG/DL — SIGNIFICANT CHANGE UP (ref 1.6–2.6)
MCHC RBC-ENTMCNC: 24.5 PG — LOW (ref 27–34)
MCHC RBC-ENTMCNC: 29.9 GM/DL — LOW (ref 32–36)
MCV RBC AUTO: 82.1 FL — SIGNIFICANT CHANGE UP (ref 80–100)
NITRITE UR-MCNC: NEGATIVE — SIGNIFICANT CHANGE UP
NRBC # BLD: 0 /100 WBCS — SIGNIFICANT CHANGE UP (ref 0–0)
NRBC # FLD: 0 K/UL — SIGNIFICANT CHANGE UP (ref 0–0)
PH UR: 8 — SIGNIFICANT CHANGE UP (ref 5–8)
PHOSPHATE SERPL-MCNC: 3 MG/DL — SIGNIFICANT CHANGE UP (ref 2.5–4.5)
PLATELET # BLD AUTO: 477 K/UL — HIGH (ref 150–400)
POTASSIUM SERPL-MCNC: 4.1 MMOL/L — SIGNIFICANT CHANGE UP (ref 3.5–5.3)
POTASSIUM SERPL-SCNC: 4.1 MMOL/L — SIGNIFICANT CHANGE UP (ref 3.5–5.3)
PROT UR-MCNC: NEGATIVE — SIGNIFICANT CHANGE UP
RBC # BLD: 3.79 M/UL — LOW (ref 4.2–5.8)
RBC # FLD: 16.1 % — HIGH (ref 10.3–14.5)
RBC CASTS # UR COMP ASSIST: 15 /HPF — HIGH (ref 0–4)
SODIUM SERPL-SCNC: 140 MMOL/L — SIGNIFICANT CHANGE UP (ref 135–145)
SP GR SPEC: 1.01 — LOW (ref 1.01–1.05)
TIBC SERPL-MCNC: 310 UG/DL — SIGNIFICANT CHANGE UP (ref 220–430)
TROPONIN T, HIGH SENSITIVITY RESULT: 36 NG/L — SIGNIFICANT CHANGE UP
TSH SERPL-MCNC: 2.18 UIU/ML — SIGNIFICANT CHANGE UP (ref 0.27–4.2)
UIBC SERPL-MCNC: 298 UG/DL — SIGNIFICANT CHANGE UP (ref 110–370)
UROBILINOGEN FLD QL: SIGNIFICANT CHANGE UP
WBC # BLD: 7.4 K/UL — SIGNIFICANT CHANGE UP (ref 3.8–10.5)
WBC # FLD AUTO: 7.4 K/UL — SIGNIFICANT CHANGE UP (ref 3.8–10.5)
WBC UR QL: 7 /HPF — HIGH (ref 0–5)

## 2023-06-03 PROCEDURE — 12345: CPT | Mod: NC

## 2023-06-03 PROCEDURE — 99222 1ST HOSP IP/OBS MODERATE 55: CPT

## 2023-06-03 PROCEDURE — 99223 1ST HOSP IP/OBS HIGH 75: CPT

## 2023-06-03 RX ORDER — ASPIRIN/CALCIUM CARB/MAGNESIUM 324 MG
81 TABLET ORAL DAILY
Refills: 0 | Status: DISCONTINUED | OUTPATIENT
Start: 2023-06-03 | End: 2023-06-03

## 2023-06-03 RX ORDER — TICAGRELOR 90 MG/1
90 TABLET ORAL EVERY 12 HOURS
Refills: 0 | Status: DISCONTINUED | OUTPATIENT
Start: 2023-06-03 | End: 2023-06-03

## 2023-06-03 RX ORDER — METOPROLOL TARTRATE 50 MG
50 TABLET ORAL ONCE
Refills: 0 | Status: COMPLETED | OUTPATIENT
Start: 2023-06-03 | End: 2023-06-03

## 2023-06-03 RX ORDER — METOPROLOL TARTRATE 50 MG
50 TABLET ORAL
Refills: 0 | Status: DISCONTINUED | OUTPATIENT
Start: 2023-06-03 | End: 2023-06-03

## 2023-06-03 RX ORDER — ALBUTEROL 90 UG/1
1 AEROSOL, METERED ORAL EVERY 4 HOURS
Refills: 0 | Status: DISCONTINUED | OUTPATIENT
Start: 2023-06-03 | End: 2023-06-03

## 2023-06-03 RX ORDER — PANTOPRAZOLE SODIUM 20 MG/1
40 TABLET, DELAYED RELEASE ORAL
Refills: 0 | Status: DISCONTINUED | OUTPATIENT
Start: 2023-06-03 | End: 2023-06-03

## 2023-06-03 RX ORDER — CEFDINIR 250 MG/5ML
1 POWDER, FOR SUSPENSION ORAL
Qty: 0 | Refills: 0 | DISCHARGE
Start: 2023-06-03

## 2023-06-03 RX ORDER — LOSARTAN POTASSIUM 100 MG/1
100 TABLET, FILM COATED ORAL DAILY
Refills: 0 | Status: DISCONTINUED | OUTPATIENT
Start: 2023-06-03 | End: 2023-06-03

## 2023-06-03 RX ORDER — ATORVASTATIN CALCIUM 80 MG/1
80 TABLET, FILM COATED ORAL AT BEDTIME
Refills: 0 | Status: DISCONTINUED | OUTPATIENT
Start: 2023-06-03 | End: 2023-06-03

## 2023-06-03 RX ADMIN — Medication 50 MILLIGRAM(S): at 01:37

## 2023-06-03 RX ADMIN — SODIUM CHLORIDE 4000 MILLILITER(S): 9 INJECTION, SOLUTION INTRAVENOUS at 00:10

## 2023-06-03 RX ADMIN — Medication 50 MILLIGRAM(S): at 05:53

## 2023-06-03 RX ADMIN — PANTOPRAZOLE SODIUM 40 MILLIGRAM(S): 20 TABLET, DELAYED RELEASE ORAL at 05:53

## 2023-06-03 RX ADMIN — TICAGRELOR 90 MILLIGRAM(S): 90 TABLET ORAL at 05:54

## 2023-06-03 NOTE — H&P ADULT - NSHPREVIEWOFSYSTEMS_GEN_ALL_CORE
REVIEW OF SYSTEMS:    CONSTITUTIONAL: No weakness, fevers or chills  EYES/ENT: No visual changes; No dysphagia; No sore throat; No rhinorrhea; No sinus pain/pressure  NECK: No pain or stiffness  RESPIRATORY: No cough, wheezing, hemoptysis; No shortness of breath  CARDIOVASCULAR: No chest pain; (+) palpitations; No lower extremity edema  GASTROINTESTINAL: No abdominal or epigastric pain. (+) nausea - resolved; No vomiting, or hematemesis; No diarrhea or constipation. No melena or hematochezia.  GENITOURINARY: No dysuria, frequency or hematuria presently   NEUROLOGICAL: No numbness, paresthesias, or weakness; No HA; (+)  LH/dizziness earlier, resolved  MSK: ambulates without aid; No falls  SKIN: No itching, burning, rashes, or lesions   All other review of systems is negative unless indicated above.

## 2023-06-03 NOTE — H&P ADULT - NSHPPHYSICALEXAM_GEN_ALL_CORE
Vital Signs Last 24 Hrs  T(C): 36.8 (03 Jun 2023 03:28), Max: 36.8 (02 Jun 2023 18:10)  T(F): 98.3 (03 Jun 2023 03:28), Max: 98.3 (02 Jun 2023 18:10)  HR: 68 (03 Jun 2023 03:28) (68 - 142)  BP: 135/70 (03 Jun 2023 03:28) (117/65 - 149/84)  BP(mean): 97 (03 Jun 2023 01:10) (85 - 97)  RR: 18 (03 Jun 2023 03:28) (17 - 22)  SpO2: 99% (03 Jun 2023 03:28) (99% - 100%)    Parameters below as of 03 Jun 2023 03:28  Patient On (Oxygen Delivery Method): room air    PHYSICAL EXAM:  GENERAL: NAD, well-developed, well-nourished  HEAD:  Atraumatic, Normocephalic  EYES: conjunctiva and sclera clear  NECK: Supple, No JVD  CHEST/LUNG: Clear to auscultation bilaterally; No wheezes, rales or rhonchi; normal work of breathing, speaking in full sentences  HEART: Regular rate and rhythm; No murmurs, rubs, or gallops, (+)S1, S2  ABDOMEN: Soft, Nontender, Nondistended; Normal Bowel sounds   EXTREMITIES:  2+ Peripheral Pulses, No clubbing, cyanosis, or edema  PSYCH: normal mood and affect, A&Ox3  NEUROLOGY: no focal neuro deficits  SKIN: No rashes or lesions

## 2023-06-03 NOTE — ASSESSMENT
[FreeTextEntry1] : 67 year old man with:\par \par 1. CAD s/p PCI in 2012 to mLAD, NSTEMI 5/20/23 s/p PCI with DIOGENES x2 to mLAD\par 2. Abnormal ECG with RBBB, LAD, LVH\par 3. Palpitations\par 4. Recurrent UTIs s/p TURP, now completing antibiotics for recurrent cystitis\par 5. Hypertension, currently controlled, but labile following recent TURP, infection, and blood loss anemia.\par 6. Anxiety\par 7. WTC exposure - followed in WTC program for GERD and asthma\par \par \par Will follow up report of 14 day [employer] patch monitor for palpitations and risk factors for arrhythmia\par Transthoracic echo with GLS given abnormal ECG and symptoms to be rescheduled\par Continue atorvastatin 80, metoprolol, aspirin and ticagrelor for CAD with recent DIOGENES\par \par We discussed increasing Toprol for tachycardia and palpitations, but he began to feel ill on the way out of the office today, with dizziness and diaphoresis. So I advised he be evaluated in the Emergency Room for sepsis and treatment with IV fluids given SIRS and known UTI. They agreed and were transported to [facility] by EMS.

## 2023-06-03 NOTE — H&P ADULT - PROBLEM SELECTOR PLAN 3
reports symptomatic previously, currently symptoms resolved   UCx reviewed, <10K urogenital royal, s/p CTX 3/27, completed 5 days of outpatient abx, will hold further abx

## 2023-06-03 NOTE — REASON FOR VISIT
[Spouse] : spouse [FreeTextEntry1] : SARAH SONG is a 67 year old man with a history of WTC exposure, HTN, CAD s/p PCI, GERD, asthma and BPH s/p TURP on 4/20/2023 who presents for follow up after NSTEMI.\par

## 2023-06-03 NOTE — HISTORY OF PRESENT ILLNESS
[FreeTextEntry1] : Interval History:\par On May 20th he went to the ER at [facility] with chest pain. Troponin T was 350 ng/L. Coronary angiogram performed on 5/22 showed ImLAD ISR which was opened and two DIOGENES were deployed. He felt better and was discharged home on DAPT. He reports adherence to DAPT. However, two days later he returned to the ER with flank pain and was diagnosed with a UTI. He is completing a 10 day course of antibiotics.\par \par He brings BP recordings from home today (he checks 4x a day), which are mostly normal. On most days, he took 60 mg of nifedipine ER and 50 mg of Toprol. He was told to stop losartan in the hospital.\par \par He is overall not feeling great. He continues to feel intermittent palpitations throughout the day. He continues to feel symptoms of anxiety and plans to seek care for this also. He continues to have intermittent abdominal/flank pain.\par \par He returned to the [employer] patch monitor yesterday and the results are pending. He did have palpitations during the monitoring period.\par \par \par History:\par Self referred because of history of heart disease.\par \par In 2012 he had chest pain while on vacation while canoeing and when he returned to [place] a cardiac catheterization was performed and a stent was deployed at [facility].\par \par No subsequent PCI. Had stress test a few years ago, but does not remember when exactly. \par \par 4/20/2023 he underwent TURP for prostatic hypertrophy with lower urinary tract symptoms, post op, he was hospitalized for hematuria requiring transfusion.\par \par He was discharged home on 4/23. After resuming antihypertensive medications, his BP was low and he felt lightheaded. Since then, he has only taken metoprolol, atorvastatin, and aspirin.\par \par Experiences episodes of irregular racing heart beat, most often this occurs in the AM. Worse since stopping his medications, and somewhat better after he takes the metoprolol.\par \par He is also experiencing abdominal pain, which has worsened since the TURP. Had f/u appointment with Dr. Moon next week.\par \par No hx, DM, no tobacco smoking. 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 He was treated with sertraline previously, but stopped it. He worries a lot about his health and other issues.\par \par No ETOH. No tobacco.\par \par Cardiovascular Summary:\par ---------------------------------------------------\par ECG:\par 6/2/2023: sinus tachycardia 101 bpm, RBBB, LAD\par 5/19/2023: NSR, RBBB, LAD, 73 bpm\par 4/1/2015: sinus 57 RBBB\par ---------------------------------------------------\par Echo:\par ---------------------------------------------------\par Cath:\par 5/22/2023: mLAD PCI w DIOGENES to ISR\par 3/2/2012: mLAD Xience 3.5 x 15 mm ([facility])\par ---------------------------------------------------\par CT/MRI\par 10/28/2022: CT abdomen/pelvis - atherosclerotic calcifications\par ----------------------------------------------\par Remote/ambulatory rhythm monitoring:

## 2023-06-03 NOTE — DISCHARGE NOTE PROVIDER - ATTENDING DISCHARGE PHYSICAL EXAMINATION:
.  VITAL SIGNS:  T(C): 37 (06-03-23 @ 13:43), Max: 37 (06-03-23 @ 13:43)  T(F): 98.6 (06-03-23 @ 13:43), Max: 98.6 (06-03-23 @ 13:43)  HR: 69 (06-03-23 @ 13:43) (68 - 142)  BP: 131/70 (06-03-23 @ 13:43) (117/65 - 149/84)  BP(mean): 97 (06-03-23 @ 01:10) (85 - 97)  RR: 18 (06-03-23 @ 13:43) (17 - 22)  SpO2: 100% (06-03-23 @ 13:43) (99% - 100%)  Wt(kg): --    PHYSICAL EXAM:    Constitutional: WDWN resting comfortably in bed; NAD  Head: NC/AT  Eyes: PERRL, EOMI, clear conjunctiva  ENT: no nasal discharge; uvula midline, no oropharyngeal erythema or exudates;   Neck: supple;   Respiratory: CTA B/L; no W/R/R, no retractions  Cardiac: +S1/S2; RRR;   Gastrointestinal: soft, NT/ND; no rebound or guarding; +BSx4  Genitourinary: normal external genitalia  Back: spine midline, no bony tenderness or step-offs; no CVAT B/L  Extremities: WWP, no clubbing or cyanosis; no peripheral edema  Musculoskeletal: NROM x4; no joint swelling, tenderness or erythema  Vascular: 2+ radial, femoral, DP/PT pulses B/L  Dermatologic: skin warm, dry and intact; no rashes, wounds, or scars  Neurologic: AAOx3; CNII-XII grossly intact; no focal deficits  Psychiatric: affect and characteristics of appearance, verbalizations, behaviors are appropriate

## 2023-06-03 NOTE — H&P ADULT - NSHPLABSRESULTS_GEN_ALL_CORE
9.9    12.74 )-----------( 522      ( 2023 19:57 )             32.4     06-02    135  |  99  |  20  ----------------------------<  113<H>  3.9   |  24  |  1.08    Ca    9.1      2023 19:57  Phos  2.6     06-  Mg     2.20     06-02    TPro  6.6  /  Alb  4.5  /  TBili  0.4  /  DBili  x   /  AST  16  /  ALT  13  /  AlkPhos  63  06-    Troponin T, High Sensitivity Result: 17 ng/L (23 @ 22:33)  Troponin T, High Sensitivity Result: 14 ng/L (23 @ 19:57)    Urinalysis Basic - ( 2023 02:30 )  Color: Colorless / Appearance: Clear / S.006 / pH: x  Gluc: x / Ketone: Negative  / Bili: Negative / Urobili: <2 mg/dL   Blood: x / Protein: Negative / Nitrite: Negative   Leuk Esterase: Moderate / RBC: 15 /HPF / WBC 7 /HPF   Sq Epi: x / Non Sq Epi: x / Bacteria: Negative    EKG personally reviewed and interpreted - NSR 100bpm, LAD, LVH, TWI V1-V2, biphasic T in aVL, RBBB, LAFB, QTc 472ms (similar to prior)

## 2023-06-03 NOTE — H&P ADULT - HISTORY OF PRESENT ILLNESS
67-year-old male with CAD s/p recent mLAD stent 5/22/23, HTN, HLD, GERD and BPH s/p TURP presented to the ED from cardiologist office for episodes of palpitations associated with LH/dizziness, nausea, diaphoresis, anxiety, blurry vision. He notes having palpitations intermittently on a daily basis, had recently had a Holter for 2 weeks, awaiting results from cards. He is currently without complaint. Of note, patient was recently diagnosed with a UTI, started on cefdinir 300mg BID, completed 5 days, has 5 days left of antibiotic course.    In the ED VS: 98.3    117-149/65-84  17-22  100%RA, received ticagrelor 90mg PO x1, cefpodoxime 200mg PO x1, LR 3.5L IVF, metoprolol tartrate 50mg PO x1

## 2023-06-03 NOTE — CONSULT NOTE ADULT - SUBJECTIVE AND OBJECTIVE BOX
Patient is a 67y old  Male who presents with a chief complaint of palpitations/SVT (2023 05:17)      HISTORY OF PRESENT ILLNESS:     67-year-old male with CAD s/p recent mLAD stent 23, HTN, HLD, GERD and BPH s/p TURP presented to the ED from cardiologist office for episodes of palpitations associated with LH/dizziness, nausea, diaphoresis, anxiety, blurry vision. He notes having palpitations intermittently on a daily basis, had recently had a Holter for 2 weeks, awaiting results from cards. He is currently without complaint. Of note, patient was recently diagnosed with a UTI, started on cefdinir 300mg BID, completed 5 days, has 5 days left of antibiotic course.    In the ED VS: 98.3    117-149/65-84  -  100%RA, received ticagrelor 90mg PO x1, cefpodoxime 200mg PO x1, LR 3.5L IVF, metoprolol tartrate 50mg PO x1    Cardiology consulted for palpitations. Patient reports he has had palpitations prior to his most recent stent. Denies chest pain or any triggers. Yesterday had an episode where he had an episode of palpitations associated with LH/dizziness, nausea, diaphoresis. Had a holter monitor for 2 weeks with no results. He was also diagnosed with UTI recently. Reports medication adherence to his DAPT. Today had an episode where HR went up to 130s to 140s after standing up in the setting of orthostatic checks. PO intake has been poor in the setting of nausea.       Allergies    No Known Allergies    Intolerances    	    MEDICATIONS:  aspirin enteric coated 81 milliGRAM(s) Oral daily  losartan 100 milliGRAM(s) Oral daily  metoprolol tartrate 50 milliGRAM(s) Oral two times a day  ticagrelor 90 milliGRAM(s) Oral every 12 hours      albuterol    90 MICROgram(s) HFA Inhaler 1 Puff(s) Inhalation every 4 hours PRN      pantoprazole    Tablet 40 milliGRAM(s) Oral before breakfast    atorvastatin 80 milliGRAM(s) Oral at bedtime        PAST MEDICAL & SURGICAL HISTORY:  HTN (hypertension)      HLD (hyperlipidemia)      BPH (benign prostatic hyperplasia)      S/P coronary artery stent placement      GERD (gastroesophageal reflux disease)      Kidney stones          FAMILY HISTORY:  No pertinent family history in first degree relatives            REVIEW OF SYSTEMS:    CONSTITUTIONAL: No weakness, fevers or chills  EYES/ENT: No visual changes;  No dysphagia  NECK: No pain or stiffness  RESPIRATORY: No cough, wheezing, hemoptysis; No shortness of breath  CARDIOVASCULAR: No chest pain or palpitations; No lower extremity edema  GASTROINTESTINAL: No abdominal or epigastric pain. No nausea, vomiting, or hematemesis; No diarrhea or constipation. No melena or hematochezia.  BACK: No back pain  GENITOURINARY: No dysuria, frequency or hematuria  NEUROLOGICAL: No numbness or weakness  SKIN: No itching, burning, rashes, or lesions   All other review of systems is negative unless indicated above.  PHYSICAL EXAM:  T(C): 37 (23 @ 13:43), Max: 37 (23 @ 13:43)  HR: 69 (23 @ 13:43) (68 - 142)  BP: 131/70 (23 @ 13:43) (117/65 - 149/84)  RR: 18 (23 @ 13:43) (17 - 22)  SpO2: 100% (23 @ 13:43) (99% - 100%)  Wt(kg): --  I&O's Summary      Appearance: Normal	  HEENT:   Normal oral mucosa, PERRL, EOMI	  Lymphatic: No lymphadenopathy  Cardiovascular: Normal S1 S2, No JVD, No murmurs, No edema  Respiratory: Lungs clear to auscultation	  Psychiatry: A & O x 3, Mood & affect appropriate  Gastrointestinal:  Soft, Non-tender, + BS	  Skin: No rashes, No ecchymoses, No cyanosis	  Neurologic: Non-focal  Extremities: Normal range of motion, No clubbing, cyanosis or edema  Vascular: Peripheral pulses palpable 2+ bilaterally        LABS:	 	    CBC Full  -  ( 2023 05:47 )  WBC Count : 7.40 K/uL  Hemoglobin : 9.3 g/dL  Hematocrit : 31.1 %  Platelet Count - Automated : 477 K/uL  Mean Cell Volume : 82.1 fL  Mean Cell Hemoglobin : 24.5 pg  Mean Cell Hemoglobin Concentration : 29.9 gm/dL  Auto Neutrophil # : x  Auto Lymphocyte # : x  Auto Monocyte # : x  Auto Eosinophil # : x  Auto Basophil # : x  Auto Neutrophil % : x  Auto Lymphocyte % : x  Auto Monocyte % : x  Auto Eosinophil % : x  Auto Basophil % : x        140  |  105  |  13  ----------------------------<  105<H>  4.1   |  23  |  0.91  06-02    135  |  99  |  20  ----------------------------<  113<H>  3.9   |  24  |  1.08    Ca    9.0      2023 05:47  Ca    9.1      2023 19:57  Phos  3.0     -  Phos  2.6     06-  Mg     2.20     06-  Mg     2.20     06-02    TPro  6.6  /  Alb  4.5  /  TBili  0.4  /  DBili  x   /  AST  16  /  ALT  13  /  AlkPhos  63  06      proBNP:   Lipid Profile:   HgA1c:   TSH: Thyroid Stimulating Hormone, Serum: 2.18 uIU/mL ( @ 05:47)        CARDIAC MARKERS:      HsT 14 -> 17 -> 36      TELEMETRY: 	  no events on tele   ECG:  	NSR RBBB and LAFB   RADIOLOGY:  OTHER: 	    PREVIOUS DIAGNOSTIC TESTING:    [ ] Echocardiogram:  [ x]  Catheterization:    Study Date:     2023   Name:           SARAH SONG   :            1955   (67 years)   Gender:         male   MR#:            [id number]   MPI#:           [id number]   Patient Class:  Inpatient     Cath Lab Report    Diagnostic Cardiologist:       Mauricio Ernst MD   Interventional Cardiologist:   Mauricio Ernst MD   Fellow:                        Yudith Connell MD   Fellow:                        Bennie Borjas MD   Referring Physician:           OSCAR PACK M.D.     Procedures Performed   Procedures:               1.    Arterial Access - Right Radial     2.    Diagnostic Coronary Angiography   3.    Left Heart Cath   4.    IVUS   5.    PCI: DIOGENES     Indications:                Myocardial infarction without ST elevation  (NSTEMI)  PCI Status:                urgent     Conclusions:   Severe stenosis in mid LAD. Patent proximal LAD stent.   Successful PCI to mid LAD with DIOGENES for treatment of unstable  angina/NSTEMI. PCI optimization with IVUS imaging.

## 2023-06-03 NOTE — CONSULT NOTE ADULT - ATTENDING COMMENTS
Pavel Gutierrez is a 67-year-old man with history of CAD s/p PCI with stent to mLAD 5/22/23, HTN, HLD, GERD and BPH s/p TURP. He presented to ED from cardiologist office for episodes of palpitations associated with lightheadedness, nausea, diaphoresis, anxiety, and blurry vision. Telemetry was noted for episode of sinus tachycardia at 134 bpm, occurring upon standing, but not associated with drop in BP. This suggested postural tachycardia or POTS syndrome, possibly a manifestation of hypovolemia. TSH 2.18 uIU/mL (normal 0.27 – 4.20). Management will include TTE to assess overall LV size and systolic function. Repeat hemodynamic (BP, HR, rhythm) assessment supine, seated and standing to assess for orthostatic hypotension. Maintain telemetry monitoring. Maintain metoprolol tartrate 50 mg oral twice daily.

## 2023-06-03 NOTE — H&P ADULT - NSHPSOCIALHISTORY_GEN_ALL_CORE
Lives with wife and 2 daughters  Retired - worked for [employer]  Denies tobacco, alcohol, or illicit drug use

## 2023-06-03 NOTE — PHYSICAL EXAM
[Normal Conjunctiva] : normal conjunctiva [No Xanthelasma] : no xanthelasma [Normal Venous Pressure] : normal venous pressure [No Carotid Bruit] : no carotid bruit [No Murmur] : no murmur [No Rub] : no rub [No Gallop] : no gallop [Clear Lung Fields] : clear lung fields [Good Air Entry] : good air entry [No Respiratory Distress] : no respiratory distress  [Soft] : abdomen soft [Normal Gait] : normal gait [Gait - Sufficient for Exercise Testing] : gait - sufficient for exercise testing [No Edema] : no edema [No Cyanosis] : no cyanosis [No Clubbing] : no clubbing [No Varicosities] : no varicosities [No Rash] : no rash [Moves all extremities] : moves all extremities [No Focal Deficits] : no focal deficits [Normal Speech] : normal speech [Alert and Oriented] : alert and oriented [Ill-Appearing] : ill-appearing [de-identified] : regular tachycardia

## 2023-06-03 NOTE — DISCHARGE NOTE PROVIDER - NSDCMRMEDTOKEN_GEN_ALL_CORE_FT
Albuterol (Eqv-ProAir HFA) 90 mcg/inh inhalation aerosol: 1 inhaled as needed for  shortness of breath and/or wheezing  aspirin 81 mg oral delayed release tablet: 1 tab(s) orally once a day MDD: 1  atorvastatin 80 mg oral tablet: 1 tab(s) orally once a day (at bedtime)  cefdinir 300 mg oral capsule: 1 cap(s) orally every 12 hours  losartan 100 mg oral tablet: 1 orally once a day  metoprolol tartrate 50 mg oral tablet: 1 tab(s) orally 2 times a day  NIFEdipine (Eqv-Adalat CC) 60 mg oral tablet, extended release: 1 orally once a day  pantoprazole 40 mg oral delayed release tablet: 1 tab(s) orally once a day (before a meal)  ticagrelor 90 mg oral tablet: 1 tab(s) orally every 12 hours

## 2023-06-03 NOTE — DISCHARGE NOTE PROVIDER - HOSPITAL COURSE
67-year-old male with CAD s/p recent mLAD stent 5/22/23, HTN, HLD, GERD and BPH s/p TURP presented to the ED from cardiologist office for episodes of palpitations associated with LH/dizziness, nausea, diaphoresis, anxiety, blurry vision. Monitored on tele with no acute events, tele strip with sinus tachycardia, no SVT noted. Pt w/ positive orthostatics which improved with IVF, home Nifedipine held and resumed for discharge.  EP consulted, recommends obtain echo and f/u cardiology. Pt AMA prior to completion of the echocardiogram and further work-up. Risks of leaving AMA including but not limited to heart attack, stroke, and potentially death made aware, pt understands and agrees to AMA. Dr. Avila made aware.      Reviewed discharge medications with patient;  Reviewed need for prescription for previous home medication and new prescriptions sent if requested. Patient in agreement and understands. 67-year-old male with CAD s/p recent mLAD stent 5/22/23, HTN, HLD, GERD and BPH s/p TURP presented to the ED from cardiologist office for episodes of palpitations associated with LH/dizziness, nausea, diaphoresis, anxiety, blurry vision. Monitored on tele with no acute events, tele strip with sinus tachycardia, no SVT noted. Pt w/ positive orthostatics which improved with IVF, home Nifedipine held and resumed for discharge.  EP consulted, recommends obtain echo and f/u cardiology. Pt AMA prior to completion of the echocardiogram and further work-up. Risks of leaving AMA including but not limited to heart attack, stroke, and potentially death made aware, pt understands and agrees to AMA. Dr. Avila made aware.      Reviewed discharge medications with patient;  Reviewed need for prescription for previous home medication and new prescriptions sent if requested. Patient in agreement and unders  ands. EP cardiology was called, Check formal echocardiogram  - Repeat orthostatics to assess if patient truly has POTS or was patient dehydrated  - TSH normal  - Monitor on telemetry  - Keep Mg > 2 and K > 4  - Continue DAPT  - Continue Metoprolol Tartrate 50mg Q12H  - Continue Atorvastatin   - Continue antihypertensives  - If work-up is negative and no telemetry events patient can likely follow-up with his outpatient cardiologist   However, patient could not wait as above and decided to go home AMA, will follow up with cardiology in the office.

## 2023-06-03 NOTE — H&P ADULT - ASSESSMENT
67-year-old male with CAD s/p recent mLAD stent 5/22/23, HTN, HLD, GERD and BPH s/p TURP presented to the ED from cardiologist office for episodes of palpitations associated with LH/dizziness, nausea, diaphoresis, anxiety, blurry vision.

## 2023-06-03 NOTE — H&P ADULT - PROBLEM SELECTOR PLAN 7
hold nifedipine for now, initial orthostatic check with SBP drop 122->105, improved s/p IVF  add back as needed  c/w ARB and BB

## 2023-06-03 NOTE — DISCHARGE NOTE PROVIDER - NSDCCPCAREPLAN_GEN_ALL_CORE_FT
PRINCIPAL DISCHARGE DIAGNOSIS  Diagnosis: Palpitations  Assessment and Plan of Treatment: You presented to the hospital from your cardiologist office due to heart palpitations and dizziness. Your heart was monitored while in the hospital, no abnormal heart rhytyms- however you were noted to have tachycardia (fast heart rate) up to 135 beats per minute. The cardiology team evaluated you and recommended an ultrasound of your heart (echocardiogram). You left against medical advice prior to the completion of this exam. Please follow up with your Cardiologist Dr. Pascal within 1 week for further evaluation and treatment.      SECONDARY DISCHARGE DIAGNOSES  Diagnosis: Acute UTI  Assessment and Plan of Treatment: You were recently treated for a UTI with antibiotics, please continue this medication til completion as prescribed. A urine culture was performed which was negative for bacteria. Follow up with your PCP within 1 week for further evaluation and treatment.    Diagnosis: GERD (gastroesophageal reflux disease)  Assessment and Plan of Treatment: Continue your anti-reflux medications as prescribed

## 2023-06-03 NOTE — CONSULT NOTE ADULT - TIME BILLING
67-year-old man with history of CAD s/p PCI with stent to mLAD 5/22/23, HTN, HLD, GERD and BPH s/p TURP. He presented to ED from cardiologist office for episodes of palpitations associated with lightheadedness, nausea, diaphoresis, anxiety, and blurry vision. Telemetry was noted for episode of sinus tachycardia at 134 bpm, occurring upon standing, but not associated with drop in BP. This suggested postural tachycardia or POTS syndrome, possibly a manifestation of hypovolemia.

## 2023-06-03 NOTE — DISCHARGE NOTE PROVIDER - NSDCFUSCHEDAPPT_GEN_ALL_CORE_FT
Lucio Thomason  [facility]  INTMED [address] [facility]  Scheduled Appointment: 06/06/2023    Juanis Aldridge  [facility]  WTCPROGRA [address]  Scheduled Appointment: 06/13/2023    Garry Ureña  [facility]  CARDIOLOGY [address]   Scheduled Appointment: 09/01/2023

## 2023-06-03 NOTE — DISCHARGE NOTE NURSING/CASE MANAGEMENT/SOCIAL WORK - PATIENT PORTAL LINK FT
You can access the FollowMyHealth Patient Portal offered by [facility] by registering at the following website: [email or web address]. By joining [employer]’s FollowMyHealth portal, you will also be able to view your health information using other applications (apps) compatible with our system.

## 2023-06-03 NOTE — DISCHARGE NOTE PROVIDER - CARE PROVIDER_API CALL
Garry Ureña  Cardiovascular Disease  [address] [place] [address], [address] [place] [id number]  Phone: [phone]  Fax: [phone]  Follow Up Time: 1-3 days

## 2023-06-03 NOTE — H&P ADULT - PROBLEM SELECTOR PLAN 1
noted on monitor  check TSH  monitor on tele  would attempt to obtain Holter data if able  EP consult in AM  check echo

## 2023-06-05 ENCOUNTER — TRANSCRIPTION ENCOUNTER (OUTPATIENT)
Age: 68
End: 2023-06-05

## 2023-06-06 ENCOUNTER — TRANSCRIPTION ENCOUNTER (OUTPATIENT)
Age: 68
End: 2023-06-06

## 2023-06-06 ENCOUNTER — APPOINTMENT (OUTPATIENT)
Dept: INTERNAL MEDICINE | Facility: CLINIC | Age: 68
End: 2023-06-06
Payer: MEDICARE

## 2023-06-06 ENCOUNTER — NON-APPOINTMENT (OUTPATIENT)
Age: 68
End: 2023-06-06

## 2023-06-06 VITALS — HEART RATE: 88 BPM | DIASTOLIC BLOOD PRESSURE: 84 MMHG | OXYGEN SATURATION: 98 % | SYSTOLIC BLOOD PRESSURE: 122 MMHG

## 2023-06-06 VITALS — HEIGHT: 67.32 IN | WEIGHT: 165 LBS | BODY MASS INDEX: 25.59 KG/M2

## 2023-06-06 PROCEDURE — 99214 OFFICE O/P EST MOD 30 MIN: CPT

## 2023-06-06 RX ORDER — MONTELUKAST 10 MG/1
10 TABLET, FILM COATED ORAL
Qty: 90 | Refills: 3 | Status: DISCONTINUED | COMMUNITY
Start: 2021-03-04 | End: 2023-06-06

## 2023-06-06 NOTE — PHYSICAL EXAM
[No Acute Distress] : no acute distress [Normal Sclera/Conjunctiva] : normal sclera/conjunctiva [Normal Outer Ear/Nose] : the outer ears and nose were normal in appearance [No Respiratory Distress] : no respiratory distress  [Clear to Auscultation] : lungs were clear to auscultation bilaterally [Normal Rate] : normal rate  [Regular Rhythm] : with a regular rhythm

## 2023-06-06 NOTE — HISTORY OF PRESENT ILLNESS
[FreeTextEntry1] : establish care\par anxiety  [de-identified] : Pavel is a 67 yr old M with pmh significant for CAD, recent MI, cardiac stenting, s/p TURP. \par He is here today to establish care\par he has a significant history of anxiety. he is having more and more panic attacks \par no SI/ HI

## 2023-06-08 ENCOUNTER — APPOINTMENT (OUTPATIENT)
Dept: CV DIAGNOSITCS | Facility: HOSPITAL | Age: 68
End: 2023-06-08

## 2023-06-13 ENCOUNTER — TRANSCRIPTION ENCOUNTER (OUTPATIENT)
Age: 68
End: 2023-06-13

## 2023-06-13 ENCOUNTER — APPOINTMENT (OUTPATIENT)
Dept: OTHER | Facility: CLINIC | Age: 68
End: 2023-06-13
Payer: COMMERCIAL

## 2023-06-13 ENCOUNTER — LABORATORY RESULT (OUTPATIENT)
Age: 68
End: 2023-06-13

## 2023-06-13 VITALS — SYSTOLIC BLOOD PRESSURE: 128 MMHG | DIASTOLIC BLOOD PRESSURE: 82 MMHG | HEART RATE: 88 BPM

## 2023-06-13 VITALS
HEIGHT: 70 IN | BODY MASS INDEX: 22.9 KG/M2 | TEMPERATURE: 98.1 F | SYSTOLIC BLOOD PRESSURE: 112 MMHG | HEART RATE: 96 BPM | WEIGHT: 160 LBS | DIASTOLIC BLOOD PRESSURE: 75 MMHG | OXYGEN SATURATION: 98 %

## 2023-06-13 DIAGNOSIS — Z04.9 ENCOUNTER FOR EXAMINATION AND OBSERVATION FOR UNSPECIFIED REASON: ICD-10-CM

## 2023-06-13 DIAGNOSIS — Z90.79 ACQUIRED ABSENCE OF OTHER GENITAL ORGAN(S): ICD-10-CM

## 2023-06-13 PROCEDURE — 99215 OFFICE O/P EST HI 40 MIN: CPT | Mod: 25

## 2023-06-13 PROCEDURE — 99397 PER PM REEVAL EST PAT 65+ YR: CPT | Mod: 25

## 2023-06-13 NOTE — HISTORY OF PRESENT ILLNESS
[FreeTextEntry1] : patient with  HTN, CAD s/p PCI, WTC related GERD,WTC related  asthma and BPH s/p TURP on 4/20/2023 / with post op hematuria , requiring blood transfusion/ ,  NSTEMI 05 20 2023 \par \par  \par :\par On May 20th he went to the ER at [facility] with chest pain. Troponin T was 350 ng/L. Coronary angiogram performed on 5/22 showed ImLAD ISR which was opened and two DIOGENES were deployed. He felt better and was discharged home on DAPT. He reports adherence to DAPT. However, two days later he returned to the ER with flank pain and was diagnosed with a UTI. He is completing a 10 day course of antibiotics.\par may 22 2023\par ACC: [id number]     EXAM:  CT ANGIO CHEST AORTA WAWIC   ORDERED BY: DORY COX\par \par ACC: [id number]     EXAM:  CT ANGIO ABD PELV (W)AW IC   ORDERED BY: DORY COX\par \par PROCEDURE DATE:  05/22/2023\par \par \par \par INTERPRETATION:  CLINICAL INFORMATION: Epigastric pain with radiation to back occurring after food intake, assess for aortic dissection\par \par COMPARISON: CT abdomen pelvis 10/26/2022\par \par \par \par PROCEDURE:\par CT Angiography of the Chest, Abdomen and Pelvis.\par Precontrast imaging was performed through the chest followed by arterial phase imaging of the chest, abdomen and pelvis.\par Sagittal and coronal reformats were performed as well as 3D (MIP) reconstructions.\par \par FINDINGS:\par CHEST:\par LUNGS AND LARGE AIRWAYS: Patent central airways. There is linear subsegmental atelectasis in both lower lobes.\par PLEURA: No pleural effusion.\par VESSELS: No aortic dissection or aneurysm. Enlargement of the main pulmonary trunk, suggesting pulmonary arterial hypertension.\par HEART: Heart size is normal. No pericardial effusion. Relative hypoattenuation of the cardiac blood pool TO the myocardium, suggesting anemia. Coronary artery calcifications.\par MEDIASTINUM AND ROSIE: No lymphadenopathy.\par CHEST WALL AND LOWER NECK: Within normal limits.\par \par ABDOMEN AND PELVIS:\par LIVER: Hepatic steatosis. There are a few arterial phase hyperenhancing foci centered along the right hepatic lobe, which likely correlate to prior regions of vascular shunting seen on previous CT study on 10/26/2022.\par BILE DUCTS: Within normal limits.\par GALLBLADDER: Cholelithiasis.\par SPLEEN: Within normal limits.\par PANCREAS: Within normal limits.\par ADRENALS: Within normal limits.\par KIDNEYS/URETERS: Bilateral renal cysts, the largest measuring 8.1 cm in the right lower pole\par Additional too small to characterize bilateral renal hypodensities. No hydronephrosis.\par \par BLADDER: Within normal limits.\par REPRODUCTIVE ORGANS: Prostate is enlarged.\par \par BOWEL: No bowel obstruction. Appendix is normal.\par PERITONEUM: No ascites.\par VESSELS: No dissection. Celiac, SMA, bilateral renal and LOGAN are all patent. There are mild atherosclerotic changes.\par RETROPERITONEUM/LYMPH NODES: No lymphadenopathy.\par ABDOMINAL WALL: Within normal limits.\par BONES: Degenerative changes.\par \par IMPRESSION:\par No aortic dissection as clinically questioned.\par \par \par \par \par \par \par \par today he is complaining of weakness and feeling dizzy \par  martinez when he gets up \par  he lays in bed most of the day \par he takes  60 mg of nifedipine ER and 100  mg of Toprol that was recently increased from 50 mg by his cardiologist . He was told to stop losartan in the hospital.\par He continues to have intermittent abdominal/fLUL, and Left lank pain.\par he had ER visit 06 03 2023 when he received IVF abd Sx improved \par  he was anemia and had low ferritin at the time \par \par he is complaining not feeling of burning , acid reflux and nausea\par  no vomiting \par  poor appetite \par  10 lbs weight loss \par \par  all symptoms  started after TURP 04 2023 \par \par asthma-no complaints \par \par  started on Pulmicort 90 MCG/ACT BID\par  and Montelukast \par  - using  Ventolin as needed daily \par \par \par \par GERD - c/o frequent epigastric burning sensation after meals \par radiating toward KATIE abd quadrant \par  had CT chest/abd/pelvis angio 06 2023 \par has cholelithiasis, but otherwise NL \par \par \par  no diarrhea, constipation \par  no n/v\par  no dysphagia \par \par \par colonoscopy 04 2015 - had TA \par colonoscopy 10 2021- had 3 TAs \par 2021 EGD - NL \par  never had ER/UC visits for asthma \par  never treated with PO steroids for asthma

## 2023-06-13 NOTE — HISTORY OF PRESENT ILLNESS
[FreeTextEntry1] : patient with  HTN, CAD s/p PCI, WTC related GERD,WTC related  asthma and BPH s/p TURP on 4/20/2023 / with post op hematuria , requiring blood transfusion/ ,  NSTEMI 05 20 2023 \par \par  \par :\par On May 20th he went to the ER at [facility] with chest pain. Troponin T was 350 ng/L. Coronary angiogram performed on 5/22 showed ImLAD ISR which was opened and two DIOGENES were deployed. He felt better and was discharged home on DAPT. He reports adherence to DAPT. However, two days later he returned to the ER with flank pain and was diagnosed with a UTI. He is completing a 10 day course of antibiotics.\par may 22 2023\par ACC: [id number]     EXAM:  CT ANGIO CHEST AORTA WAWIC   ORDERED BY: DORY COX\par \par ACC: [id number]     EXAM:  CT ANGIO ABD PELV (W)AW IC   ORDERED BY: DORY COX\par \par PROCEDURE DATE:  05/22/2023\par \par \par \par INTERPRETATION:  CLINICAL INFORMATION: Epigastric pain with radiation to back occurring after food intake, assess for aortic dissection\par \par COMPARISON: CT abdomen pelvis 10/26/2022\par \par \par \par PROCEDURE:\par CT Angiography of the Chest, Abdomen and Pelvis.\par Precontrast imaging was performed through the chest followed by arterial phase imaging of the chest, abdomen and pelvis.\par Sagittal and coronal reformats were performed as well as 3D (MIP) reconstructions.\par \par FINDINGS:\par CHEST:\par LUNGS AND LARGE AIRWAYS: Patent central airways. There is linear subsegmental atelectasis in both lower lobes.\par PLEURA: No pleural effusion.\par VESSELS: No aortic dissection or aneurysm. Enlargement of the main pulmonary trunk, suggesting pulmonary arterial hypertension.\par HEART: Heart size is normal. No pericardial effusion. Relative hypoattenuation of the cardiac blood pool TO the myocardium, suggesting anemia. Coronary artery calcifications.\par MEDIASTINUM AND ROSIE: No lymphadenopathy.\par CHEST WALL AND LOWER NECK: Within normal limits.\par \par ABDOMEN AND PELVIS:\par LIVER: Hepatic steatosis. There are a few arterial phase hyperenhancing foci centered along the right hepatic lobe, which likely correlate to prior regions of vascular shunting seen on previous CT study on 10/26/2022.\par BILE DUCTS: Within normal limits.\par GALLBLADDER: Cholelithiasis.\par SPLEEN: Within normal limits.\par PANCREAS: Within normal limits.\par ADRENALS: Within normal limits.\par KIDNEYS/URETERS: Bilateral renal cysts, the largest measuring 8.1 cm in the right lower pole\par Additional too small to characterize bilateral renal hypodensities. No hydronephrosis.\par \par BLADDER: Within normal limits.\par REPRODUCTIVE ORGANS: Prostate is enlarged.\par \par BOWEL: No bowel obstruction. Appendix is normal.\par PERITONEUM: No ascites.\par VESSELS: No dissection. Celiac, SMA, bilateral renal and LOGAN are all patent. There are mild atherosclerotic changes.\par RETROPERITONEUM/LYMPH NODES: No lymphadenopathy.\par ABDOMINAL WALL: Within normal limits.\par BONES: Degenerative changes.\par \par IMPRESSION:\par No aortic dissection as clinically questioned.\par \par \par \par \par \par \par \par today he is complaining of weakness and feeling dizzy \par  martinez when he gets up \par  he lays in bed most of the day \par he takes  60 mg of nifedipine ER and 100  mg of Toprol that was recently increased from 50 mg by his cardiologist . He was told to stop losartan in the hospital.\par He continues to have intermittent abdominal/fLUL, and Left lank pain.\par he had ER visit 06 03 2023 when he received IVF abd Sx improved \par  he was anemia and had low ferritin at the time \par \par he is complaining not feeling of burning , acid reflux and nausea\par  no vomiting \par  poor appetite \par  10 lbs weight loss \par \par  all symptoms  started after TURP 04 2023 \par \par asthma-no complaints \par \par  started on Pulmicort 90 MCG/ACT BID\par  and Montelukast \par  - using  Ventolin as needed daily \par \par \par \par GERD - c/o frequent epigastric burning sensation after meals \par radiating toward KATIE abd quadrant \par  had CT chest/abd/pelvis angio 06 2023 \par has cholelithiasis, but otherwise NL \par \par \par  no diarrhea, constipation \par  no n/v\par  no dysphagia \par \par \par colonoscopy 04 2015 - had TA \par colonoscopy 10 2021- had 3 TAs \par 2021 EGD - NL \par  never had ER/UC visits for asthma \par  never treated with PO steroids for asthma

## 2023-06-13 NOTE — DISCUSSION/SUMMARY
[Patient seen for WTC Monitoring ___] : Patient was seen for WTC monitoring [unfilled] [Please See Note in Chart and Documentation in Trial DB] : Please see note in chart and documentation in Trial DB. 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

## 2023-06-13 NOTE — ASSESSMENT
[FreeTextEntry1] : patient appears to have orthostatic changes of the pulse \par  HR up to 130 wheh walking few feet, \par  returns to 80-90 when supine , no orthostatic changes of BP \par  was recently anemia with low ferritin \par  i will reach out to his PCP once his labs are back \par  he might need IV iron or transfusion as he is symptomatic\par  anemia started post TURP hematuria  for BPH 04 2023 \par \par \par \par \par asthma- patient reports much better  control since started on Pulmicort and Montelukast last year but ran out of meds \par Pulmicort and Montelukast\par  continue ventolin as needed \par \par GERD- seems worse \par d/c OMeprazole 20 mg \par  start Pantoprazole 40 mg - take in AM prior to am meal \par \par  take other meds after breakfast \par refer to GI \par \par

## 2023-06-13 NOTE — HEALTH RISK ASSESSMENT
[Patient reported colonoscopy was abnormal] : Patient reported colonoscopy was abnormal [ColonoscopyComments] : 3 tubular adenomas  [ColonoscopyDate] : 2021

## 2023-06-15 ENCOUNTER — TRANSCRIPTION ENCOUNTER (OUTPATIENT)
Age: 68
End: 2023-06-15

## 2023-06-15 ENCOUNTER — APPOINTMENT (OUTPATIENT)
Dept: OTHER | Facility: CLINIC | Age: 68
End: 2023-06-15

## 2023-06-16 LAB
ALBUMIN SERPL ELPH-MCNC: 4.9 G/DL
ALP BLD-CCNC: 77 U/L
ALT SERPL-CCNC: 17 U/L
ANION GAP SERPL CALC-SCNC: 16 MMOL/L
APPEARANCE: CLEAR
AST SERPL-CCNC: 17 U/L
BACTERIA: NEGATIVE /HPF
BILIRUB SERPL-MCNC: 0.6 MG/DL
BILIRUBIN URINE: NEGATIVE
BLOOD URINE: ABNORMAL
BUN SERPL-MCNC: 17 MG/DL
CALCIUM SERPL-MCNC: 9.9 MG/DL
CAST: 2 /LPF
CHLORIDE SERPL-SCNC: 101 MMOL/L
CHOLEST SERPL-MCNC: 101 MG/DL
CO2 SERPL-SCNC: 22 MMOL/L
COLOR: YELLOW
CREAT SERPL-MCNC: 1.1 MG/DL
EGFR: 73 ML/MIN/1.73M2
EPITHELIAL CELLS: 1 /HPF
GLUCOSE QUALITATIVE U: NEGATIVE MG/DL
GLUCOSE SERPL-MCNC: 113 MG/DL
HDLC SERPL-MCNC: 56 MG/DL
KETONES URINE: NEGATIVE MG/DL
LDLC SERPL CALC-MCNC: 26 MG/DL
LEUKOCYTE ESTERASE URINE: ABNORMAL
MICROSCOPIC-UA: NORMAL
NITRITE URINE: NEGATIVE
NONHDLC SERPL-MCNC: 45 MG/DL
PH URINE: 7
POTASSIUM SERPL-SCNC: 5.2 MMOL/L
PROT SERPL-MCNC: 7.1 G/DL
PROTEIN URINE: 30 MG/DL
RED BLOOD CELLS URINE: 2 /HPF
SODIUM SERPL-SCNC: 140 MMOL/L
SPECIFIC GRAVITY URINE: 1.02
TRIGL SERPL-MCNC: 90 MG/DL
UROBILINOGEN URINE: 0.2 MG/DL
WHITE BLOOD CELLS URINE: 77 /HPF

## 2023-06-20 ENCOUNTER — TRANSCRIPTION ENCOUNTER (OUTPATIENT)
Age: 68
End: 2023-06-20

## 2023-06-23 ENCOUNTER — APPOINTMENT (OUTPATIENT)
Dept: CV DIAGNOSITCS | Facility: HOSPITAL | Age: 68
End: 2023-06-23

## 2023-06-23 ENCOUNTER — OUTPATIENT (OUTPATIENT)
Dept: OUTPATIENT SERVICES | Facility: HOSPITAL | Age: 68
LOS: 1 days | End: 2023-06-23
Payer: MEDICARE

## 2023-06-23 DIAGNOSIS — I25.118 ATHEROSCLEROTIC HEART DISEASE OF NATIVE CORONARY ARTERY WITH OTHER FORMS OF ANGINA PECTORIS: ICD-10-CM

## 2023-06-23 DIAGNOSIS — N20.0 CALCULUS OF KIDNEY: Chronic | ICD-10-CM

## 2023-06-23 DIAGNOSIS — I21.4 NON-ST ELEVATION (NSTEMI) MYOCARDIAL INFARCTION: ICD-10-CM

## 2023-06-23 PROCEDURE — 93306 TTE W/DOPPLER COMPLETE: CPT | Mod: 26

## 2023-06-30 NOTE — H&P ADULT - BIRTH SEX
Called patient to discuss. No answer, left detailed message with [facility] call back number.   
LOV 6/23/23 left hip pain.    Spoke to patient. He states that the cortisone injection has not kicked in at all and does not feel it will in the 24 hours. He did have about 50% relief from the injection initially but that lasted only 20 minutes. His pain has been worse than prior to the injection. He can barely place weight on his leg and has to use crutches due to the pain. He states the pain is sharp and constant. He has tried the meloxicam, ice and heat with no relief. He has some tramadol and percocet from a previous surgery which he took and only gave him short lasting relief. He is not sure what to do. He is requesting MD to look at xrays to make sure nothing was missed because he does not understand why his hip hurts so much. If surgery is really the next step he wants to schedule asap as he does not want to live in this pain for much longer.   
Patient is calling in regards to cortisone injection not working. Patient states that he is in worse pain than before he received the injection. Patient is looking to speak to MD or PA in regards to care plan going forwards. Please advise.  
Male

## 2023-07-13 ENCOUNTER — NON-APPOINTMENT (OUTPATIENT)
Age: 68
End: 2023-07-13

## 2023-08-01 ENCOUNTER — APPOINTMENT (OUTPATIENT)
Dept: GASTROENTEROLOGY | Facility: CLINIC | Age: 68
End: 2023-08-01
Payer: COMMERCIAL

## 2023-08-01 VITALS — BODY MASS INDEX: 23.96 KG/M2 | WEIGHT: 167 LBS

## 2023-08-01 VITALS
OXYGEN SATURATION: 98 % | SYSTOLIC BLOOD PRESSURE: 125 MMHG | HEIGHT: 70 IN | BODY MASS INDEX: 22.9 KG/M2 | DIASTOLIC BLOOD PRESSURE: 79 MMHG | WEIGHT: 160 LBS | TEMPERATURE: 97.2 F | HEART RATE: 62 BPM

## 2023-08-01 DIAGNOSIS — R14.0 ABDOMINAL DISTENSION (GASEOUS): ICD-10-CM

## 2023-08-01 DIAGNOSIS — Z87.898 PERSONAL HISTORY OF OTHER SPECIFIED CONDITIONS: ICD-10-CM

## 2023-08-01 DIAGNOSIS — K63.5 POLYP OF COLON: ICD-10-CM

## 2023-08-01 PROCEDURE — 99214 OFFICE O/P EST MOD 30 MIN: CPT

## 2023-08-01 NOTE — PHYSICAL EXAM
[Alert] : alert [Normal Voice/Communication] : normal voice/communication [Healthy Appearing] : healthy appearing [No Acute Distress] : no acute distress [Sclera] : the sclera and conjunctiva were normal [Hearing Threshold Finger Rub Not M[place]] : hearing was normal [Normal Appearance] : the appearance of the neck was normal [No Respiratory Distress] : no respiratory distress [No Acc Muscle Use] : no accessory muscle use [Respiration, Rhythm And Depth] : normal respiratory rhythm and effort [Auscultation Breath Sounds / Voice Sounds] : lungs were clear to auscultation bilaterally [Heart Rate And Rhythm] : heart rate was normal and rhythm regular [Normal S1, S2] : normal S1 and S2 [Bowel Sounds] : normal bowel sounds [Abdomen Tenderness] : non-tender [No Masses] : no abdominal mass palpated [Abdomen Soft] : soft [No CVA Tenderness] : no CVA  tenderness [Abnormal Walk] : normal gait [Normal Color / Pigmentation] : normal skin color and pigmentation [No Focal Deficits] : no focal deficits [Oriented To Time, Place, And Person] : oriented to person, place, and time

## 2023-08-01 NOTE — HISTORY OF PRESENT ILLNESS
[de-identified] : 5/22/23 PROCEDURE: CT Angiography of the Chest, Abdomen and Pelvis. Precontrast imaging was performed through the chest followed by arterial  phase imaging of the chest, abdomen and pelvis. Sagittal and coronal reformats were performed as well as 3D (MIP)  reconstructions.  FINDINGS: CHEST: LUNGS AND LARGE AIRWAYS: Patent central airways. There is linear  subsegmental atelectasis in both lower lobes. PLEURA: No pleural effusion. VESSELS: No aortic dissection or aneurysm. Enlargement of the main  pulmonary trunk, suggesting pulmonary arterial hypertension. HEART: Heart size is normal. No pericardial effusion. Relative  hypoattenuation of the cardiac blood pool TO the myocardium, suggesting  anemia. Coronary artery calcifications. MEDIASTINUM AND ROSIE: No lymphadenopathy. CHEST WALL AND LOWER NECK: Within normal limits.  ABDOMEN AND PELVIS: LIVER: Hepatic steatosis. There are a few arterial phase hyperenhancing  foci centered along the right hepatic lobe, which likely correlate to  prior regions of vascular shunting seen on previous CT study on  10/26/2022. BILE DUCTS: Within normal limits. GALLBLADDER: Cholelithiasis. SPLEEN: Within normal limits. PANCREAS: Within normal limits. ADRENALS: Within normal limits. KIDNEYS/URETERS: Bilateral renal cysts, the largest measuring 8.1 cm in  the right lower pole Additional too small to characterize bilateral renal hypodensities. No  hydronephrosis.  BLADDER: Within normal limits. REPRODUCTIVE ORGANS: Prostate is enlarged.  BOWEL: No bowel obstruction. Appendix is normal. PERITONEUM: No ascites. VESSELS: No dissection. Celiac, SMA, bilateral renal and LOGAN are all  patent. There are mild atherosclerotic changes. RETROPERITONEUM/LYMPH NODES: No lymphadenopathy. ABDOMINAL WALL: Within normal limits. BONES: Degenerative changes.  IMPRESSION: No aortic dissection as clinically questioned.  [FreeTextEntry1] : 11/29/22 FINDINGS: LOWER CHEST: Within normal limits.  LIVER: Normal morphology. No significant steatosis or suspicious lesion. BILE DUCTS: Normal caliber. GALLBLADDER: Cholelithiasis. SPLEEN: Small 8 mm enhancing nodule inferior spleen was present on  T2-weighted images likely a small hemangioma Small accessory spleen. PANCREAS: Normal T1 signal and enhancement. Prominent pancreatic tail  without evidence of mass lesion. No evidence of inflammation. Main  pancreatic duct is normal in caliber. Unremarkable ductal anatomy. ADRENALS: Within normal limits. KIDNEYS/URETERS: Bilateral renal cysts. No hydronephrosis.  VISUALIZED PORTIONS: BOWEL: Within normal limits. PERITONEUM: No ascites. VESSELS: Within normal limits. RETROPERITONEUM/LYMPH NODES: No lymphadenopathy. ABDOMINAL WALL: Within normal limits. BONES: Degenerative changes.  IMPRESSION:  No evidence of pancreatitis or pancreatic neoplasm.  Cholelithiasis.

## 2023-08-01 NOTE — ASSESSMENT
[FreeTextEntry1] : Impression: 1. Bloating - suspect due to high FODMAP foods; ddx also includes dysbiosis, SIBO 2. History of adenomas with villous features 3. Chronic GERD, no Freire's or hiatal hernia on EGD  Plan: -Discussed high FODMAP foods -Asked pt to first avoid dairy products including cheese and chewing gum for 2 weeks -if symptoms continue, advised food diary and return to office to discuss further food elimination -Consider Xifaxan if no improvement with diet modifications -PRN simethicone for the bloating -Otherwise up to date with colonoscopy, will need repeat colonoscopy given prior adenoma in 2024 -Continue PPI daily for now while we try to treat bloating -Advised lifestyle modifications for reflux. Asked patient to avoid eating 3 hours before going to bed or laying down, and to elevate the head of his bed.

## 2023-08-10 ENCOUNTER — APPOINTMENT (OUTPATIENT)
Dept: INTERNAL MEDICINE | Facility: CLINIC | Age: 68
End: 2023-08-10
Payer: MEDICARE

## 2023-08-10 VITALS
BODY MASS INDEX: 23.48 KG/M2 | SYSTOLIC BLOOD PRESSURE: 168 MMHG | HEART RATE: 60 BPM | DIASTOLIC BLOOD PRESSURE: 91 MMHG | HEIGHT: 70 IN | OXYGEN SATURATION: 99 % | WEIGHT: 164 LBS

## 2023-08-10 VITALS — SYSTOLIC BLOOD PRESSURE: 148 MMHG | DIASTOLIC BLOOD PRESSURE: 86 MMHG

## 2023-08-10 PROCEDURE — 99214 OFFICE O/P EST MOD 30 MIN: CPT

## 2023-08-10 RX ORDER — BUDESONIDE 90 UG/1
90 AEROSOL, POWDER RESPIRATORY (INHALATION)
Qty: 3 | Refills: 2 | Status: DISCONTINUED | COMMUNITY
Start: 2021-03-04 | End: 2023-08-10

## 2023-08-10 NOTE — PHYSICAL EXAM
[No Acute Distress] : no acute distress [Well Nourished] : well nourished [Normal Sclera/Conjunctiva] : normal sclera/conjunctiva [Normal Outer Ear/Nose] : the outer ears and nose were normal in appearance [No Respiratory Distress] : no respiratory distress  [No Accessory Muscle Use] : no accessory muscle use [Normal Rate] : normal rate  [Regular Rhythm] : with a regular rhythm [No Edema] : there was no peripheral edema [No Rash] : no rash [Normal Affect] : the affect was normal [Normal Insight/Judgement] : insight and judgment were intact

## 2023-08-10 NOTE — HISTORY OF PRESENT ILLNESS
[FreeTextEntry1] : f/u  [de-identified] : pt states he is still not feeling very well on the lexapro. states he isn't finding pleasure in things that typically would have brought him pleasure. good appetite  sleep is hit or mis though  no SI/ HI  pt's BP is elevated. he is not taking his nifedipine. states his BP drops to 80s/60s and it scares him. does not have follow up with cardiology until September

## 2023-08-30 ENCOUNTER — RX RENEWAL (OUTPATIENT)
Age: 68
End: 2023-08-30

## 2023-09-05 NOTE — ASU PATIENT PROFILE, ADULT - BRAND OF COVID-19 VACCINATION
Pt presents to ED via EMS for alcohol detox. Pt is unsure when her last drink was. VSS. Pt denies SI/HI   Pfizer dose 1, 2, and 3

## 2023-09-21 ENCOUNTER — APPOINTMENT (OUTPATIENT)
Dept: INTERNAL MEDICINE | Facility: CLINIC | Age: 68
End: 2023-09-21
Payer: MEDICARE

## 2023-09-21 VITALS
DIASTOLIC BLOOD PRESSURE: 68 MMHG | SYSTOLIC BLOOD PRESSURE: 126 MMHG | HEIGHT: 70 IN | OXYGEN SATURATION: 97 % | HEART RATE: 78 BPM | WEIGHT: 169 LBS | BODY MASS INDEX: 24.2 KG/M2

## 2023-09-21 PROCEDURE — 99213 OFFICE O/P EST LOW 20 MIN: CPT

## 2023-09-21 RX ORDER — ESCITALOPRAM OXALATE 20 MG/1
20 TABLET ORAL DAILY
Qty: 60 | Refills: 0 | Status: DISCONTINUED | COMMUNITY
Start: 2023-06-06 | End: 2023-09-21

## 2023-09-21 RX ORDER — ASPIRIN 81 MG/1
81 TABLET, COATED ORAL
Qty: 90 | Refills: 3 | Status: DISCONTINUED | COMMUNITY
Start: 2023-08-30 | End: 2023-09-21

## 2023-09-22 ENCOUNTER — APPOINTMENT (OUTPATIENT)
Dept: CARDIOLOGY | Facility: CLINIC | Age: 68
End: 2023-09-22
Payer: MEDICARE

## 2023-09-22 VITALS
DIASTOLIC BLOOD PRESSURE: 76 MMHG | OXYGEN SATURATION: 97 % | WEIGHT: 171.52 LBS | HEART RATE: 79 BPM | SYSTOLIC BLOOD PRESSURE: 143 MMHG | HEIGHT: 70 IN | TEMPERATURE: 98.2 F | BODY MASS INDEX: 24.55 KG/M2

## 2023-09-22 DIAGNOSIS — Z87.440 PERSONAL HISTORY OF URINARY (TRACT) INFECTIONS: ICD-10-CM

## 2023-09-22 DIAGNOSIS — Z03.89 ENCOUNTER FOR OBSERVATION FOR OTHER SUSPECTED DISEASES AND CONDITIONS RULED OUT: ICD-10-CM

## 2023-09-22 PROCEDURE — 99214 OFFICE O/P EST MOD 30 MIN: CPT

## 2023-09-22 PROCEDURE — 93010 ELECTROCARDIOGRAM REPORT: CPT

## 2023-09-22 PROCEDURE — 93000 ELECTROCARDIOGRAM COMPLETE: CPT

## 2023-09-22 RX ORDER — NIFEDIPINE 60 MG/1
60 TABLET, EXTENDED RELEASE ORAL
Qty: 90 | Refills: 0 | Status: COMPLETED | COMMUNITY
Start: 2023-04-06

## 2023-09-22 RX ORDER — ONDANSETRON 4 MG/1
4 TABLET, ORALLY DISINTEGRATING ORAL
Qty: 9 | Refills: 0 | Status: COMPLETED | COMMUNITY
Start: 2023-05-27

## 2023-09-22 RX ORDER — OXYBUTYNIN CHLORIDE 5 MG/1
5 TABLET ORAL
Qty: 6 | Refills: 0 | Status: COMPLETED | COMMUNITY
Start: 2023-04-23

## 2023-09-23 LAB
ALBUMIN SERPL ELPH-MCNC: 4.5 G/DL
ALP BLD-CCNC: 90 U/L
ALT SERPL-CCNC: 19 U/L
ANION GAP SERPL CALC-SCNC: 11 MMOL/L
AST SERPL-CCNC: 21 U/L
BILIRUB SERPL-MCNC: 0.6 MG/DL
BUN SERPL-MCNC: 18 MG/DL
CALCIUM SERPL-MCNC: 9.3 MG/DL
CHLORIDE SERPL-SCNC: 102 MMOL/L
CHOLEST SERPL-MCNC: 130 MG/DL
CO2 SERPL-SCNC: 26 MMOL/L
CREAT SERPL-MCNC: 0.98 MG/DL
EGFR: 83 ML/MIN/1.73M2
ESTIMATED AVERAGE GLUCOSE: 114 MG/DL
GLUCOSE SERPL-MCNC: 107 MG/DL
HBA1C MFR BLD HPLC: 5.6 %
HDLC SERPL-MCNC: 71 MG/DL
LDLC SERPL CALC-MCNC: 36 MG/DL
NONHDLC SERPL-MCNC: 59 MG/DL
NT-PROBNP SERPL-MCNC: 111 PG/ML
POTASSIUM SERPL-SCNC: 5 MMOL/L
PROT SERPL-MCNC: 7 G/DL
SODIUM SERPL-SCNC: 139 MMOL/L
TRIGL SERPL-MCNC: 132 MG/DL

## 2023-09-25 LAB
DEPRECATED KAPPA LC FREE/LAMBDA SER: 1.49 RATIO
KAPPA LC CSF-MCNC: 1.35 MG/DL
KAPPA LC SERPL-MCNC: 2.01 MG/DL

## 2023-09-27 LAB — M PROTEIN SPEC IFE-MCNC: NORMAL

## 2023-10-04 ENCOUNTER — APPOINTMENT (OUTPATIENT)
Dept: INTERNAL MEDICINE | Facility: CLINIC | Age: 68
End: 2023-10-04

## 2023-10-10 ENCOUNTER — APPOINTMENT (OUTPATIENT)
Dept: GASTROENTEROLOGY | Facility: CLINIC | Age: 68
End: 2023-10-10
Payer: COMMERCIAL

## 2023-10-10 VITALS
DIASTOLIC BLOOD PRESSURE: 78 MMHG | HEART RATE: 72 BPM | HEIGHT: 70 IN | WEIGHT: 166 LBS | SYSTOLIC BLOOD PRESSURE: 128 MMHG | BODY MASS INDEX: 23.77 KG/M2 | OXYGEN SATURATION: 96 %

## 2023-10-10 DIAGNOSIS — R13.19 OTHER DYSPHAGIA: ICD-10-CM

## 2023-10-10 DIAGNOSIS — K21.9 GASTRO-ESOPHAGEAL REFLUX DISEASE W/OUT ESOPHAGITIS: ICD-10-CM

## 2023-10-10 PROCEDURE — 99214 OFFICE O/P EST MOD 30 MIN: CPT

## 2023-10-16 ENCOUNTER — RX CHANGE (OUTPATIENT)
Age: 68
End: 2023-10-16

## 2023-10-17 ENCOUNTER — RX RENEWAL (OUTPATIENT)
Age: 68
End: 2023-10-17

## 2023-10-26 NOTE — END OF VISIT
Care endorsed to Arlene Hong RN [Time Spent: ___ minutes] : I have spent [unfilled] minutes of time on the encounter.

## 2023-11-01 ENCOUNTER — APPOINTMENT (OUTPATIENT)
Dept: INTERNAL MEDICINE | Facility: CLINIC | Age: 68
End: 2023-11-01
Payer: MEDICARE

## 2023-11-01 VITALS
TEMPERATURE: 98.6 F | SYSTOLIC BLOOD PRESSURE: 149 MMHG | WEIGHT: 166 LBS | HEIGHT: 70 IN | BODY MASS INDEX: 23.77 KG/M2 | OXYGEN SATURATION: 98 % | DIASTOLIC BLOOD PRESSURE: 80 MMHG | HEART RATE: 72 BPM

## 2023-11-01 VITALS — SYSTOLIC BLOOD PRESSURE: 132 MMHG | DIASTOLIC BLOOD PRESSURE: 70 MMHG

## 2023-11-01 DIAGNOSIS — J45.909 UNSPECIFIED ASTHMA, UNCOMPLICATED: ICD-10-CM

## 2023-11-01 PROCEDURE — 99214 OFFICE O/P EST MOD 30 MIN: CPT

## 2023-11-01 RX ORDER — SERTRALINE HYDROCHLORIDE 100 MG/1
100 TABLET, FILM COATED ORAL DAILY
Qty: 30 | Refills: 0 | Status: DISCONTINUED | COMMUNITY
Start: 2023-09-21 | End: 2023-11-01

## 2023-11-07 ENCOUNTER — RX RENEWAL (OUTPATIENT)
Age: 68
End: 2023-11-07

## 2023-11-07 ENCOUNTER — APPOINTMENT (OUTPATIENT)
Dept: INTERNAL MEDICINE | Facility: CLINIC | Age: 68
End: 2023-11-07
Payer: MEDICARE

## 2023-11-07 VITALS
DIASTOLIC BLOOD PRESSURE: 84 MMHG | WEIGHT: 165 LBS | HEART RATE: 76 BPM | OXYGEN SATURATION: 99 % | HEIGHT: 70 IN | BODY MASS INDEX: 23.62 KG/M2 | SYSTOLIC BLOOD PRESSURE: 137 MMHG

## 2023-11-07 LAB — GLUCOSE BLDC GLUCOMTR-MCNC: 123

## 2023-11-07 PROCEDURE — 82962 GLUCOSE BLOOD TEST: CPT

## 2023-11-07 PROCEDURE — 36415 COLL VENOUS BLD VENIPUNCTURE: CPT

## 2023-11-07 PROCEDURE — 99214 OFFICE O/P EST MOD 30 MIN: CPT | Mod: 25

## 2023-11-14 NOTE — DISCHARGE NOTE NURSING/CASE MANAGEMENT/SOCIAL WORK - NSPROEXTENSIONSOFSELF_GEN_A_NUR
clothing Zygomaticofacial Flap Text: Given the location of the defect, shape of the defect and the proximity to free margins a zygomaticofacial flap was deemed most appropriate for repair. Using a sterile surgical marker, the appropriate flap was drawn incorporating the defect and placing the expected incisions within the relaxed skin tension lines where possible. The area thus outlined was incised deep to adipose tissue with a #15 scalpel blade with preservation of a vascular pedicle.  The skin margins were undermined to an appropriate distance in all directions utilizing iris scissors. The flap was then carried over into the defect and anchored with interrupted buried subcutaneous sutures.

## 2023-11-15 ENCOUNTER — APPOINTMENT (OUTPATIENT)
Dept: GASTROENTEROLOGY | Facility: HOSPITAL | Age: 68
End: 2023-11-15
Payer: MEDICARE

## 2023-11-15 ENCOUNTER — TRANSCRIPTION ENCOUNTER (OUTPATIENT)
Age: 68
End: 2023-11-15

## 2023-11-15 ENCOUNTER — OUTPATIENT (OUTPATIENT)
Dept: OUTPATIENT SERVICES | Facility: HOSPITAL | Age: 68
LOS: 1 days | End: 2023-11-15
Payer: MEDICARE

## 2023-11-15 VITALS
WEIGHT: 164.91 LBS | OXYGEN SATURATION: 98 % | HEIGHT: 70 IN | SYSTOLIC BLOOD PRESSURE: 120 MMHG | TEMPERATURE: 98 F | HEART RATE: 63 BPM | DIASTOLIC BLOOD PRESSURE: 75 MMHG

## 2023-11-15 DIAGNOSIS — K21.9 GASTRO-ESOPHAGEAL REFLUX DISEASE WITHOUT ESOPHAGITIS: ICD-10-CM

## 2023-11-15 DIAGNOSIS — N20.0 CALCULUS OF KIDNEY: Chronic | ICD-10-CM

## 2023-11-15 LAB
ALBUMIN SERPL ELPH-MCNC: 4.8 G/DL
ALP BLD-CCNC: 108 U/L
ALT SERPL-CCNC: 23 U/L
ANION GAP SERPL CALC-SCNC: 15 MMOL/L
AST SERPL-CCNC: 22 U/L
BILIRUB SERPL-MCNC: 0.6 MG/DL
BUN SERPL-MCNC: 18 MG/DL
C PEPTIDE SERPL-MCNC: 7 NG/ML
CALCIUM SERPL-MCNC: 9.8 MG/DL
CHLORIDE SERPL-SCNC: 101 MMOL/L
CO2 SERPL-SCNC: 24 MMOL/L
CREAT SERPL-MCNC: 0.95 MG/DL
EGFR: 87 ML/MIN/1.73M2
ESTIMATED AVERAGE GLUCOSE: 126 MG/DL
GLUCOSE SERPL-MCNC: 130 MG/DL
HBA1C MFR BLD HPLC: 6 %
POTASSIUM SERPL-SCNC: 4.3 MMOL/L
PROT SERPL-MCNC: 7.6 G/DL
SODIUM SERPL-SCNC: 140 MMOL/L

## 2023-11-15 PROCEDURE — 91010 ESOPHAGUS MOTILITY STUDY: CPT | Mod: 26

## 2023-11-15 PROCEDURE — 91010 ESOPHAGUS MOTILITY STUDY: CPT

## 2023-11-15 PROCEDURE — 91037 ESOPH IMPED FUNCTION TEST: CPT | Mod: 26

## 2023-11-15 PROCEDURE — C1889: CPT

## 2023-11-15 DEVICE — CATH VERSAFLEX Z PH: Type: IMPLANTABLE DEVICE | Status: FUNCTIONAL

## 2023-11-15 RX ORDER — ALBUTEROL 90 UG/1
1 AEROSOL, METERED ORAL
Refills: 0 | DISCHARGE

## 2023-11-15 RX ORDER — NIFEDIPINE 30 MG
1 TABLET, EXTENDED RELEASE 24 HR ORAL
Refills: 0 | DISCHARGE

## 2023-11-15 RX ORDER — LOSARTAN POTASSIUM 100 MG/1
1 TABLET, FILM COATED ORAL
Refills: 0 | DISCHARGE

## 2023-11-15 NOTE — PRE PROCEDURE NOTE - PRE PROCEDURE EVALUATION
Attending Physician:              Devonte Carter MD MSEd    Indication for Procedure:        Dysphagia, GERD                PAST MEDICAL & SURGICAL HISTORY:  HTN (hypertension)      HLD (hyperlipidemia)      BPH (benign prostatic hyperplasia)      S/P coronary artery stent placement      GERD (gastroesophageal reflux disease)      Kidney stones            See Allscripts Note for further details  ALLERGIES:  No Known Allergies    HOME MEDICATIONS:  Albuterol (Eqv-ProAir HFA) 90 mcg/inh inhalation aerosol: 1 inhaled as needed for  shortness of breath and/or wheezing  cefdinir 300 mg oral capsule: 1 cap(s) orally every 12 hours  losartan 100 mg oral tablet: 1 orally once a day  NIFEdipine (Eqv-Adalat CC) 60 mg oral tablet, extended release: 1 orally once a day      See Allscripts Note for further details    AICD/PPM: [x ] yes   [ ] no    PERTINENT LAB DATA:                      PHYSICAL EXAMINATION:    Height (cm): 177.8  Weight (kg): 74.8  BMI (kg/m2): 23.7  BSA (m2): 1.92T(C): 36.5  HR: 63  BP: 120/75  RR: --  SpO2: 98%    Constitutional: NAD  HEENT: PERRLA, EOMI,    Neck:  No JVD  Respiratory: CTAB/L  Cardiovascular: S1 and S2  Gastrointestinal: BS+, soft, NT/ND  Extremities: No peripheral edema  Neurological: A/O x 3, no focal deficits  Psychiatric: Normal mood, normal affect  Skin: No rashes    ASA Class: I [ ]  II [x ]  III [ ]  IV [ ]    COMMENTS:    The patient is a suitable candidate for the planned procedure unless box checked [ ]  No, explain:

## 2023-11-15 NOTE — ASU PATIENT PROFILE, ADULT - NSICDXPASTMEDICALHX_GEN_ALL_CORE_FT
Normal
PAST MEDICAL HISTORY:  BPH (benign prostatic hyperplasia)     GERD (gastroesophageal reflux disease)     HLD (hyperlipidemia)     HTN (hypertension)     S/P coronary artery stent placement

## 2023-11-15 NOTE — ASU PREOP CHECKLIST - ANTIBIOTIC
Spoke with patient regarding FSH prescription. Patient instructed order has been placed and to call [facility] later this week to order medication.   Reviewed and approved by JENNIFFER SEXTON on 10/2/23 at 5:45 PM.    n/a

## 2023-11-16 PROCEDURE — 91038 ESOPH IMPED FUNCT TEST > 1HR: CPT | Mod: 26

## 2023-11-21 LAB
INSULIN FREE SERPL-MCNC: 24 UU/ML
INSULIN: 24 UU/ML

## 2023-11-22 ENCOUNTER — APPOINTMENT (OUTPATIENT)
Dept: DERMATOLOGY | Facility: CLINIC | Age: 68
End: 2023-11-22
Payer: MEDICARE

## 2023-11-22 DIAGNOSIS — L81.8 OTHER SPECIFIED DISORDERS OF PIGMENTATION: ICD-10-CM

## 2023-11-22 DIAGNOSIS — L85.9 EPIDERMAL THICKENING, UNSPECIFIED: ICD-10-CM

## 2023-11-22 DIAGNOSIS — B35.3 TINEA PEDIS: ICD-10-CM

## 2023-11-22 DIAGNOSIS — L82.1 OTHER SEBORRHEIC KERATOSIS: ICD-10-CM

## 2023-11-22 DIAGNOSIS — B35.1 TINEA UNGUIUM: ICD-10-CM

## 2023-11-22 PROCEDURE — 99213 OFFICE O/P EST LOW 20 MIN: CPT

## 2023-11-22 RX ORDER — AMMONIUM LACTATE 12 %
12 LOTION (GRAM) TOPICAL
Qty: 1 | Refills: 5 | Status: ACTIVE | COMMUNITY
Start: 2023-11-22 | End: 1900-01-01

## 2023-11-22 RX ORDER — CLOTRIMAZOLE 10 MG/G
1 CREAM TOPICAL
Qty: 1 | Refills: 5 | Status: ACTIVE | COMMUNITY
Start: 2023-11-22 | End: 1900-01-01

## 2023-12-20 ENCOUNTER — APPOINTMENT (OUTPATIENT)
Dept: INTERNAL MEDICINE | Facility: CLINIC | Age: 68
End: 2023-12-20
Payer: MEDICARE

## 2023-12-20 VITALS
BODY MASS INDEX: 24.2 KG/M2 | DIASTOLIC BLOOD PRESSURE: 86 MMHG | WEIGHT: 169 LBS | HEART RATE: 69 BPM | OXYGEN SATURATION: 98 % | SYSTOLIC BLOOD PRESSURE: 156 MMHG | HEIGHT: 70 IN

## 2023-12-20 VITALS — SYSTOLIC BLOOD PRESSURE: 134 MMHG | DIASTOLIC BLOOD PRESSURE: 82 MMHG

## 2023-12-20 DIAGNOSIS — M62.838 OTHER MUSCLE SPASM: ICD-10-CM

## 2023-12-20 PROCEDURE — 99214 OFFICE O/P EST MOD 30 MIN: CPT

## 2023-12-20 RX ORDER — NIFEDIPINE 30 MG/1
30 TABLET, EXTENDED RELEASE ORAL DAILY
Qty: 90 | Refills: 0 | Status: DISCONTINUED | COMMUNITY
End: 2023-12-20

## 2023-12-20 RX ORDER — AZITHROMYCIN 250 MG/1
250 TABLET, FILM COATED ORAL
Qty: 1 | Refills: 0 | Status: DISCONTINUED | COMMUNITY
Start: 2023-11-01 | End: 2023-12-20

## 2023-12-21 PROBLEM — M62.838 TRAPEZIUS MUSCLE SPASM: Status: ACTIVE | Noted: 2023-12-20
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
## 2023-12-21 NOTE — PHYSICAL EXAM
[No Acute Distress] : no acute distress [Well Nourished] : well nourished [EOMI] : extraocular movements intact [Normal Outer Ear/Nose] : the outer ears and nose were normal in appearance [No Respiratory Distress] : no respiratory distress  [No Edema] : there was no peripheral edema [de-identified] : ROM intact, no tenderness to palpation however there is spasm in the trapezius, no tenderness over bony prominences,

## 2023-12-22 ENCOUNTER — APPOINTMENT (OUTPATIENT)
Dept: UROLOGY | Facility: CLINIC | Age: 68
End: 2023-12-22
Payer: MEDICARE

## 2023-12-22 DIAGNOSIS — N40.1 BENIGN PROSTATIC HYPERPLASIA WITH LOWER URINARY TRACT SYMPMS: ICD-10-CM

## 2023-12-22 PROCEDURE — 99212 OFFICE O/P EST SF 10 MIN: CPT

## 2023-12-22 NOTE — HISTORY OF PRESENT ILLNESS
[FreeTextEntry1] : referred for evaluation of an elevated PSA and Lts. notes long h/o later; as high a 11 and had a negative biopsy @6 years ago.  Also had MRI within 2 years - thinks it was negative but doesn't know details. PSA now 8.2 27%   also has LUTs. Has been on Tamsulosin for years and finasteride for 6 months.  He notes frequency with urgency and can have leakage; uses a bottle in car. Has nocturia 4-5 times. The FOS is slower and can push to start and has intermittency. Remote h/o retention.  No hematuria or UTI symptoms. PVR 85  3/23 - had him double up the tamsulosin - not much difference other than improved urgency  MRI notes 126cc prostate and NO suspicious lesions.   12/22/23 - Pt. presents s/p TURP done in May. Pt reports that his urination is" better than ever" all of his LUTs have resolved. Denies hematuria or pain.

## 2023-12-23 NOTE — ASU PATIENT PROFILE, ADULT - PATIENT KNOW
Patient has now viewed positive influenza A result online. Writer sent patient a message to call clinic with any questions or concerns.    yes

## 2024-01-04 LAB
PSA FREE FLD-MCNC: 24 %
PSA FREE SERPL-MCNC: 1.47 NG/ML
PSA SERPL-MCNC: 6.15 NG/ML

## 2024-01-17 NOTE — ED PROVIDER NOTE - CADM POA PRESS ULCER
No

## 2024-01-22 ENCOUNTER — RX RENEWAL (OUTPATIENT)
Age: 69
End: 2024-01-22

## 2024-01-22 RX ORDER — DICLOFENAC SODIUM 1% 10 MG/G
1 GEL TOPICAL DAILY
Qty: 100 | Refills: 0 | Status: ACTIVE | COMMUNITY
Start: 2023-12-20 | End: 1900-01-01

## 2024-02-06 ENCOUNTER — RX RENEWAL (OUTPATIENT)
Age: 69
End: 2024-02-06

## 2024-03-13 ENCOUNTER — LABORATORY RESULT (OUTPATIENT)
Age: 69
End: 2024-03-13

## 2024-03-13 ENCOUNTER — APPOINTMENT (OUTPATIENT)
Dept: INTERNAL MEDICINE | Facility: CLINIC | Age: 69
End: 2024-03-13
Payer: MEDICARE

## 2024-03-13 VITALS
WEIGHT: 176.5 LBS | BODY MASS INDEX: 26.14 KG/M2 | SYSTOLIC BLOOD PRESSURE: 167 MMHG | HEART RATE: 74 BPM | HEIGHT: 69 IN | DIASTOLIC BLOOD PRESSURE: 82 MMHG | OXYGEN SATURATION: 99 %

## 2024-03-13 VITALS — DIASTOLIC BLOOD PRESSURE: 78 MMHG | SYSTOLIC BLOOD PRESSURE: 140 MMHG

## 2024-03-13 DIAGNOSIS — M79.10 MYALGIA, UNSPECIFIED SITE: ICD-10-CM

## 2024-03-13 DIAGNOSIS — F41.9 ANXIETY DISORDER, UNSPECIFIED: ICD-10-CM

## 2024-03-13 PROCEDURE — G2211 COMPLEX E/M VISIT ADD ON: CPT

## 2024-03-13 PROCEDURE — 36415 COLL VENOUS BLD VENIPUNCTURE: CPT

## 2024-03-13 PROCEDURE — 99214 OFFICE O/P EST MOD 30 MIN: CPT

## 2024-03-13 RX ORDER — CITALOPRAM HYDROBROMIDE 10 MG/1
10 TABLET, FILM COATED ORAL
Qty: 90 | Refills: 0 | Status: DISCONTINUED | COMMUNITY
Start: 2023-12-20 | End: 2024-03-13

## 2024-03-13 RX ORDER — CITALOPRAM HYDROBROMIDE 20 MG/1
20 TABLET, FILM COATED ORAL
Qty: 90 | Refills: 0 | Status: DISCONTINUED | COMMUNITY
Start: 2023-11-01 | End: 2024-03-13

## 2024-03-14 PROBLEM — F41.9 ANXIETY: Status: ACTIVE | Noted: 2023-05-25

## 2024-03-14 PROBLEM — M79.10 MYALGIA: Status: ACTIVE | Noted: 2024-03-13

## 2024-03-14 NOTE — PHYSICAL EXAM
[No Acute Distress] : no acute distress [Normal Sclera/Conjunctiva] : normal sclera/conjunctiva [Clear to Auscultation] : lungs were clear to auscultation bilaterally [No Respiratory Distress] : no respiratory distress  [Normal Rate] : normal rate  [No Edema] : there was no peripheral edema [Normal S1, S2] : normal S1 and S2 [No Joint Swelling] : no joint swelling [Normal] : no joint swelling and grossly normal strength and tone [Grossly Normal Strength/Tone] : grossly normal strength/tone [No Rash] : no rash

## 2024-03-14 NOTE — HISTORY OF PRESENT ILLNESS
[FreeTextEntry1] : f/u anxiety HTN myalgias  [de-identified] : had some twitching with the celexa. noted arms and legs twitchign in his sleep. he stopped taking the celexa about 2 weeks ago. however now his anxiety and depression symptoms have returned.   now complaints that he is having muscle pains in all of his extremities.  The patient states that while he was in [place] he was doing a lot of physical work.  He had no difficulty with the heavy tasks however since then he has diffuse muscle aches.  Feels like a muscle soreness.  No weakness.

## 2024-04-02 ENCOUNTER — RX RENEWAL (OUTPATIENT)
Age: 69
End: 2024-04-02

## 2024-04-02 RX ORDER — PANTOPRAZOLE 40 MG/1
40 TABLET, DELAYED RELEASE ORAL
Qty: 90 | Refills: 2 | Status: ACTIVE | COMMUNITY
Start: 2023-05-26 | End: 1900-01-01

## 2024-04-04 DIAGNOSIS — R79.89 OTHER SPECIFIED ABNORMAL FINDINGS OF BLOOD CHEMISTRY: ICD-10-CM

## 2024-04-04 LAB
ALBUMIN SERPL ELPH-MCNC: 4.5 G/DL
ALP BLD-CCNC: 91 U/L
ALT SERPL-CCNC: 25 U/L
ANION GAP SERPL CALC-SCNC: 14 MMOL/L
AST SERPL-CCNC: 24 U/L
BASOPHILS # BLD AUTO: 0.06 K/UL
BASOPHILS NFR BLD AUTO: 0.9 %
BILIRUB SERPL-MCNC: 0.6 MG/DL
BUN SERPL-MCNC: 24 MG/DL
CALCIUM SERPL-MCNC: 9.8 MG/DL
CHLORIDE SERPL-SCNC: 102 MMOL/L
CK SERPL-CCNC: 109 U/L
CO2 SERPL-SCNC: 26 MMOL/L
CREAT SERPL-MCNC: 0.98 MG/DL
EGFR: 83 ML/MIN/1.73M2
EOSINOPHIL # BLD AUTO: 0.06 K/UL
EOSINOPHIL NFR BLD AUTO: 0.9 %
GLUCOSE SERPL-MCNC: 97 MG/DL
HCT VFR BLD CALC: 42 %
HGB BLD-MCNC: 11.6 G/DL
LYMPHOCYTES # BLD AUTO: 2.36 K/UL
LYMPHOCYTES NFR BLD AUTO: 34.2 %
MAN DIFF?: NORMAL
MCHC RBC-ENTMCNC: 23 PG
MCHC RBC-ENTMCNC: 27.6 GM/DL
MCV RBC AUTO: 83.2 FL
MONOCYTES # BLD AUTO: 0.42 K/UL
MONOCYTES NFR BLD AUTO: 6.1 %
NEUTROPHILS # BLD AUTO: 4 K/UL
NEUTROPHILS NFR BLD AUTO: 57.9 %
PLATELET # BLD AUTO: 347 K/UL
POTASSIUM SERPL-SCNC: 4.6 MMOL/L
PROT SERPL-MCNC: 7 G/DL
RBC # BLD: 5.05 M/UL
RBC # FLD: 17.8 %
SODIUM SERPL-SCNC: 142 MMOL/L
WBC # FLD AUTO: 6.9 K/UL

## 2024-04-05 ENCOUNTER — RESULT REVIEW (OUTPATIENT)
Age: 69
End: 2024-04-05

## 2024-04-05 ENCOUNTER — APPOINTMENT (OUTPATIENT)
Dept: CARDIOLOGY | Facility: CLINIC | Age: 69
End: 2024-04-05
Payer: MEDICARE

## 2024-04-05 ENCOUNTER — OUTPATIENT (OUTPATIENT)
Dept: OUTPATIENT SERVICES | Facility: HOSPITAL | Age: 69
LOS: 1 days | Discharge: HOME | End: 2024-04-05
Payer: MEDICARE

## 2024-04-05 VITALS
HEIGHT: 69 IN | OXYGEN SATURATION: 100 % | DIASTOLIC BLOOD PRESSURE: 86 MMHG | HEART RATE: 67 BPM | BODY MASS INDEX: 25.93 KG/M2 | SYSTOLIC BLOOD PRESSURE: 161 MMHG | WEIGHT: 175.04 LBS | TEMPERATURE: 98.2 F

## 2024-04-05 DIAGNOSIS — D64.9 ANEMIA, UNSPECIFIED: ICD-10-CM

## 2024-04-05 DIAGNOSIS — R63.2 POLYPHAGIA: ICD-10-CM

## 2024-04-05 DIAGNOSIS — Z86.79 PERSONAL HISTORY OF OTHER DISEASES OF THE CIRCULATORY SYSTEM: ICD-10-CM

## 2024-04-05 DIAGNOSIS — N20.0 CALCULUS OF KIDNEY: Chronic | ICD-10-CM

## 2024-04-05 DIAGNOSIS — Z90.79 ACQUIRED ABSENCE OF OTHER GENITAL ORGAN(S): ICD-10-CM

## 2024-04-05 DIAGNOSIS — Z01.818 ENCOUNTER FOR OTHER PREPROCEDURAL EXAMINATION: ICD-10-CM

## 2024-04-05 DIAGNOSIS — E78.00 PURE HYPERCHOLESTEROLEMIA, UNSPECIFIED: ICD-10-CM

## 2024-04-05 DIAGNOSIS — R05.2 SUBACUTE COUGH: ICD-10-CM

## 2024-04-05 DIAGNOSIS — I21.4 NON-ST ELEVATION (NSTEMI) MYOCARDIAL INFARCTION: ICD-10-CM

## 2024-04-05 DIAGNOSIS — Z87.898 PERSONAL HISTORY OF OTHER SPECIFIED CONDITIONS: ICD-10-CM

## 2024-04-05 DIAGNOSIS — R93.5 ABNORMAL FINDINGS ON DIAGNOSTIC IMAGING OF OTHER ABDOMINAL REGIONS, INCLUDING RETROPERITONEUM: ICD-10-CM

## 2024-04-05 DIAGNOSIS — R09.89 OTHER SPECIFIED SYMPTOMS AND SIGNS INVOLVING THE CIRCULATORY AND RESPIRATORY SYSTEMS: ICD-10-CM

## 2024-04-05 DIAGNOSIS — L85.3 XEROSIS CUTIS: ICD-10-CM

## 2024-04-05 DIAGNOSIS — I10 ESSENTIAL (PRIMARY) HYPERTENSION: ICD-10-CM

## 2024-04-05 DIAGNOSIS — M19.90 UNSPECIFIED OSTEOARTHRITIS, UNSPECIFIED SITE: ICD-10-CM

## 2024-04-05 DIAGNOSIS — Z23 ENCOUNTER FOR IMMUNIZATION: ICD-10-CM

## 2024-04-05 DIAGNOSIS — Z86.39 PERSONAL HISTORY OF OTHER ENDOCRINE, NUTRITIONAL AND METABOLIC DISEASE: ICD-10-CM

## 2024-04-05 DIAGNOSIS — I25.118 ATHEROSCLEROTIC HEART DISEASE OF NATIVE CORONARY ARTERY WITH OTHER FORMS OF ANGINA PECTORIS: ICD-10-CM

## 2024-04-05 DIAGNOSIS — R00.2 PALPITATIONS: ICD-10-CM

## 2024-04-05 DIAGNOSIS — D50.9 IRON DEFICIENCY ANEMIA, UNSPECIFIED: ICD-10-CM

## 2024-04-05 DIAGNOSIS — Z87.19 PERSONAL HISTORY OF OTHER DISEASES OF THE DIGESTIVE SYSTEM: ICD-10-CM

## 2024-04-05 LAB
ANISOCYTOSIS BLD QL: SLIGHT — SIGNIFICANT CHANGE UP
BASOPHILS # BLD AUTO: 0.04 K/UL — SIGNIFICANT CHANGE UP (ref 0–0.2)
BASOPHILS NFR BLD AUTO: 0.6 % — SIGNIFICANT CHANGE UP (ref 0–2)
DACRYOCYTES BLD QL SMEAR: SLIGHT — SIGNIFICANT CHANGE UP
ELLIPTOCYTES BLD QL SMEAR: SLIGHT — SIGNIFICANT CHANGE UP
EOSINOPHIL # BLD AUTO: 0.04 K/UL — SIGNIFICANT CHANGE UP (ref 0–0.5)
EOSINOPHIL NFR BLD AUTO: 0.6 % — SIGNIFICANT CHANGE UP (ref 0–6)
ERYTHROCYTE [SEDIMENTATION RATE] IN BLOOD: 4 MM/HR — SIGNIFICANT CHANGE UP (ref 0–20)
HCT VFR BLD CALC: 38.7 % — LOW (ref 39–50)
HGB BLD-MCNC: 11.3 G/DL — LOW (ref 13–17)
HYPOCHROMIA BLD QL: SLIGHT — SIGNIFICANT CHANGE UP
IMM GRANULOCYTES NFR BLD AUTO: 0.3 % — SIGNIFICANT CHANGE UP (ref 0–0.9)
LYMPHOCYTES # BLD AUTO: 1.54 K/UL — SIGNIFICANT CHANGE UP (ref 1–3.3)
LYMPHOCYTES # BLD AUTO: 24.5 % — SIGNIFICANT CHANGE UP (ref 13–44)
MCHC RBC-ENTMCNC: 23.4 PG — LOW (ref 27–34)
MCHC RBC-ENTMCNC: 29.2 G/DL — LOW (ref 32–36)
MCV RBC AUTO: 80.3 FL — SIGNIFICANT CHANGE UP (ref 80–100)
MONOCYTES # BLD AUTO: 0.64 K/UL — SIGNIFICANT CHANGE UP (ref 0–0.9)
MONOCYTES NFR BLD AUTO: 10.2 % — SIGNIFICANT CHANGE UP (ref 2–14)
NEUTROPHILS # BLD AUTO: 4 K/UL — SIGNIFICANT CHANGE UP (ref 1.8–7.4)
NEUTROPHILS NFR BLD AUTO: 63.8 % — SIGNIFICANT CHANGE UP (ref 43–77)
NRBC # BLD: 0 /100 WBCS — SIGNIFICANT CHANGE UP (ref 0–0)
PLAT MORPH BLD: NORMAL — SIGNIFICANT CHANGE UP
PLATELET # BLD AUTO: 320 K/UL — SIGNIFICANT CHANGE UP (ref 150–400)
POIKILOCYTOSIS BLD QL AUTO: SLIGHT — SIGNIFICANT CHANGE UP
RBC # BLD: 4.82 M/UL — SIGNIFICANT CHANGE UP (ref 4.2–5.8)
RBC # FLD: 17 % — HIGH (ref 10.3–14.5)
RBC BLD AUTO: ABNORMAL
RETICS #: 76.5 K/UL — SIGNIFICANT CHANGE UP (ref 25–125)
RETICS/RBC NFR: 1.6 % — SIGNIFICANT CHANGE UP (ref 0.5–2.5)
WBC # BLD: 6.28 K/UL — SIGNIFICANT CHANGE UP (ref 3.8–10.5)
WBC # FLD AUTO: 6.28 K/UL — SIGNIFICANT CHANGE UP (ref 3.8–10.5)

## 2024-04-05 PROCEDURE — G2211 COMPLEX E/M VISIT ADD ON: CPT

## 2024-04-05 PROCEDURE — 93010 ELECTROCARDIOGRAM REPORT: CPT

## 2024-04-05 PROCEDURE — 99214 OFFICE O/P EST MOD 30 MIN: CPT

## 2024-04-05 RX ORDER — HYDROCHLOROTHIAZIDE 12.5 MG/1
12.5 CAPSULE ORAL DAILY
Qty: 90 | Refills: 2 | Status: ACTIVE | COMMUNITY
Start: 2023-09-22 | End: 1900-01-01

## 2024-04-05 RX ORDER — ATORVASTATIN CALCIUM 80 MG/1
80 TABLET, FILM COATED ORAL
Qty: 90 | Refills: 3 | Status: ACTIVE | COMMUNITY
Start: 1900-01-01 | End: 1900-01-01

## 2024-04-05 RX ORDER — TICAGRELOR 90 MG/1
90 TABLET ORAL
Qty: 180 | Refills: 2 | Status: ACTIVE | COMMUNITY
Start: 2023-05-26 | End: 1900-01-01

## 2024-04-05 RX ORDER — LOSARTAN POTASSIUM 100 MG/1
100 TABLET, FILM COATED ORAL DAILY
Qty: 90 | Refills: 3 | Status: ACTIVE | COMMUNITY
Start: 1900-01-01 | End: 1900-01-01

## 2024-04-05 RX ORDER — METOPROLOL SUCCINATE 100 MG/1
100 TABLET, EXTENDED RELEASE ORAL
Qty: 90 | Refills: 3 | Status: ACTIVE | COMMUNITY
Start: 2024-02-06 | End: 1900-01-01

## 2024-04-05 NOTE — ASSESSMENT
[FreeTextEntry1] : ---------------------------------------- 68 year old man with:  1. CAD s/p PCI in 2012 to mLAD, NSTEMI 5/20/23 s/p PCI with DIOGENES x2 to mLAD 2. Abnormal ECG with RBBB, LAD, LVH, normal LV size and function by echo 3. PACs 4. Recurrent UTIs s/p TURP 5. Labile hypertension 6. Anxiety 7. WTC exposure - followed in [facility] program for GERD and asthma. 8. Anemia (confirmed on repeat)  Recommendations: 1. Will send anemia workup, check ESR, CRP, RA Abs given polyarthralgia  2. Patient was advised to follow up with GI to arrange recommended colonoscopy 3. Continue current antihypertensive therapy for now, monitor BP: losartan 100, Toprol 100, HCTZ 12.5 4. Can DC aspirin 81 mg daily now and continue ticagrelor monotherapy for CAD 5. Continue atorvastatin 80 mg daily for CAD - symptoms not suggestive of statin myalgia and CK normal 6. If BP remains uncontrolled, can change losartan to a more potent ARB, and/or increase diuretic. 7. Follow up in 6 months with me  Above discussed with the patient and his wife and all questions answered to the best of my ability and to their apparent satisfaction. Discussed recommendations re antiplatelet therapy and high intensity statin therapy and they agree with the above recommendations and plan.

## 2024-04-05 NOTE — REASON FOR VISIT
[Spouse] : spouse [FreeTextEntry1] : ----------------------------------------------- SARAH SONG is a 68 year old man with a history of WTC exposure, HTN, GERD, asthma and BPH s/p TURP on 4/20/2023, CAD s/p PCI (2012 and 5/2023 to mLAD ISR for NSTEMI) who presents for follow up.

## 2024-04-05 NOTE — REVIEW OF SYSTEMS
[Heartburn] : heartburn [Anxiety] : anxiety [Negative] : Heme/Lymph [SOB] : no shortness of breath [Dyspnea on exertion] : not dyspnea during exertion [Chest Discomfort] : no chest discomfort [Lower Ext Edema] : no extremity edema [Palpitations] : no palpitations [Blood in stool] : no blood in stoo [Joint Pain] : joint pain [Joint Swelling] : no joint swelling [Joint Stiffness] : joint stiffness [Muscle Cramps] : no muscle cramps [Dizziness] : no dizziness [Easy Bleeding] : no tendency for easy bleeding

## 2024-04-05 NOTE — HISTORY OF PRESENT ILLNESS
[FreeTextEntry1] : Interval History: He reports feeling overall well from the heart standpoint - without palpitations, chest pains, or dizziness, shortness of breath. He would like to know if he needs to continue aspirin and ticagrelor. He is experiencing pain in his neck, shoulder, wrists, and ankles. This is worst first thing in the AM, and improves throughout the day. He does not have edema, warmth, tenderness, or joint swelling. He saw his PCP who suggested it might be statin myalgia. Blood work done by PCP showed normocytic anemia with high RDW, hgb 11.6. He has not had bleeding. Last colonoscopy in  recommended a follow up study in 2024.  BP at times elevated at medical appointments, but he reports it is normal at home.  They are preparing to move to [place] for part of the year.  History: Self referred because of history of heart disease.  In  he had chest pain while on vacation while canoeing and when he returned to [place] a cardiac catheterization was performed and a stent was deployed at [facility]. No subsequent PCI. Had stress test a few years ago, but does not remember when exactly.   2023 he underwent TURP for prostatic hypertrophy with lower urinary tract symptoms, post op, he was hospitalized for hematuria requiring transfusion. He was discharged home on . After resuming antihypertensive medications, his BP was low and he felt lightheaded. Since then, he has only taken metoprolol, atorvastatin, and aspirin.  Experiences episodes of irregular racing heart beat, most often this occurs in the AM. Worse since stopping his medications, and somewhat better after he takes the metoprolol. He is also experiencing abdominal pain, which worsened since TURP.   No hx, DM, no tobacco smoking.   No other hospitalization. Lives in , with wife, two adult children (37, 26) and grandchild. Retired [employer] employee, was exposed to [place] after [place].  He and his wife relay a propensity to anxiety. He was treated with sertraline previously, but stopped it. He worries a lot about his health and other issues.  No ETOH. No tobacco.  2023: On May 20th he went to the ER at [facility] with chest pain. Troponin T was 350 ng/L. Coronary angiogram performed on  showed mLAD ISR which was opened and two DIOGENES were deployed. He felt better and was discharged home on DAPT. He reports adherence to DAPT. However, two days later he returned to the ER with flank pain and was diagnosed with a UTI. He is completing a 10 day course of antibiotics.  BP recordings at home (he checks 4x a day), are mostly normal. On most days, he took 60 mg of nifedipine ER and 50 mg of Toprol. He as told to stop losartan in the hospital.  He is overall not feeling great. He continues to feel intermittent palpitations throughout the day. He continues to feel symptoms of anxiety and plans to seek care for this also. He continues to have intermittent abdominal/flank pain. He returned to the Zio patch. He did have palpitations during the monitoring period.  2023: Overall - he feels much better than a few months ago. He reports having nightmares when he taking nifedipine. He would like to stop it and change to something else if possible. Adherent to DAPT. No chest pain.  Cardiovascular Summary: --------------------------------------------------- EC2024: NSR 67 bpm, RBBB, LAD,  2023: NSR 73 bpm, RBBB, LAD 2023: sinus tachycardia 101 bpm, RBBB, LAD 2023: NSR, RBBB, LAD, 73 bpm 2015: sinus 57 RBBB --------------------------------------------------- Echo: 2023: EF 60 %, myxomatous MV w/o prolapse or regurg, normal LV wall thickness, no effusion --------------------------------------------------- Cath: 2023: mLAD PCI w DIOGENES to ISR 3/2/2012: mLAD Xience 3.5 x 15 mm ([facility]) --------------------------------------------------- CT/MRI 10/28/2022: CT abdomen/pelvis - atherosclerotic calcifications ---------------------------------------------- Remote/ambulatory rhythm monitorin2023: sinus tachycardia, rare VPCs, PACs

## 2024-04-06 LAB
ALBUMIN SERPL ELPH-MCNC: 4.5 G/DL
ALP BLD-CCNC: 81 U/L
ALT SERPL-CCNC: 19 U/L
ANION GAP SERPL CALC-SCNC: 14 MMOL/L
AST SERPL-CCNC: 22 U/L
BILIRUB SERPL-MCNC: 0.4 MG/DL
BUN SERPL-MCNC: 17 MG/DL
CALCIUM SERPL-MCNC: 9.8 MG/DL
CHLORIDE SERPL-SCNC: 103 MMOL/L
CO2 SERPL-SCNC: 26 MMOL/L
CREAT SERPL-MCNC: 0.94 MG/DL
CRP SERPL-MCNC: <3 MG/L
EGFR: 88 ML/MIN/1.73M2
FERRITIN SERPL-MCNC: 9 NG/ML
FOLATE SERPL-MCNC: 10.2 NG/ML
GLUCOSE SERPL-MCNC: 97 MG/DL
IRON SATN MFR SERPL: 4 %
IRON SERPL-MCNC: 17 UG/DL
POTASSIUM SERPL-SCNC: 4.7 MMOL/L
PROT SERPL-MCNC: 7 G/DL
RHEUMATOID FACT SER QL: <10 IU/ML
SODIUM SERPL-SCNC: 142 MMOL/L
TIBC SERPL-MCNC: 415 UG/DL
UIBC SERPL-MCNC: 398 UG/DL
VIT B12 SERPL-MCNC: 471 PG/ML

## 2024-04-09 LAB
CCP AB SER IA-ACNC: <8 UNITS
RF+CCP IGG SER-IMP: NEGATIVE

## 2024-04-17 PROBLEM — D50.9 IRON DEFICIENCY ANEMIA, UNSPECIFIED IRON DEFICIENCY ANEMIA TYPE: Status: ACTIVE | Noted: 2024-04-17

## 2024-04-18 NOTE — ASU PATIENT PROFILE, ADULT - FALL HARM RISK CONCLUSION
----- Message from Kelsea Villegas sent at 4/18/2024 10:26 AM CDT -----  hydrocortisone (ANUSOL-HC) 2.5 % rectal cream 28 g 12 6/6/2023 -  Sig: Place rectally 2 (two) times daily    [facility]/pharmacy #[id number] - [address] [address]  Phone: [phone] Fax: [phone]  Hours: Not open 24 hours  
Universal Safety Interventions

## 2024-06-12 ENCOUNTER — APPOINTMENT (OUTPATIENT)
Dept: INTERNAL MEDICINE | Facility: CLINIC | Age: 69
End: 2024-06-12

## 2024-09-02 NOTE — PATIENT PROFILE ADULT - FUNCTIONAL ASSESSMENT - DAILY ACTIVITY SECTION LABEL
Alert-The patient is alert, awake and responds to voice. The patient is oriented to time, place, and person. The triage nurse is able to obtain subjective information.
.

## 2024-12-23 ENCOUNTER — APPOINTMENT (OUTPATIENT)
Dept: CARDIOLOGY | Facility: CLINIC | Age: 69
End: 2024-12-23
Payer: MEDICARE

## 2024-12-23 VITALS
SYSTOLIC BLOOD PRESSURE: 177 MMHG | RESPIRATION RATE: 16 BRPM | TEMPERATURE: 98.1 F | HEIGHT: 69 IN | DIASTOLIC BLOOD PRESSURE: 78 MMHG | HEART RATE: 62 BPM | WEIGHT: 182.54 LBS | OXYGEN SATURATION: 98 % | BODY MASS INDEX: 27.04 KG/M2

## 2024-12-23 DIAGNOSIS — Z04.9 ENCOUNTER FOR EXAMINATION AND OBSERVATION FOR UNSPECIFIED REASON: ICD-10-CM

## 2024-12-23 DIAGNOSIS — D50.9 IRON DEFICIENCY ANEMIA, UNSPECIFIED: ICD-10-CM

## 2024-12-23 DIAGNOSIS — I10 ESSENTIAL (PRIMARY) HYPERTENSION: ICD-10-CM

## 2024-12-23 DIAGNOSIS — Z86.2 PERSONAL HISTORY OF DISEASES OF THE BLOOD AND BLOOD-FORMING ORGANS AND CERTAIN DISORDERS INVOLVING THE IMMUNE MECHANISM: ICD-10-CM

## 2024-12-23 DIAGNOSIS — I25.118 ATHEROSCLEROTIC HEART DISEASE OF NATIVE CORONARY ARTERY WITH OTHER FORMS OF ANGINA PECTORIS: ICD-10-CM

## 2024-12-23 DIAGNOSIS — R73.03 PREDIABETES.: ICD-10-CM

## 2024-12-23 DIAGNOSIS — R79.89 OTHER SPECIFIED ABNORMAL FINDINGS OF BLOOD CHEMISTRY: ICD-10-CM

## 2024-12-23 DIAGNOSIS — Z12.11 ENCOUNTER FOR SCREENING FOR MALIGNANT NEOPLASM OF COLON: ICD-10-CM

## 2024-12-23 DIAGNOSIS — R00.2 PALPITATIONS: ICD-10-CM

## 2024-12-23 PROCEDURE — G2211 COMPLEX E/M VISIT ADD ON: CPT

## 2024-12-23 PROCEDURE — 99214 OFFICE O/P EST MOD 30 MIN: CPT

## 2024-12-23 PROCEDURE — 93000 ELECTROCARDIOGRAM COMPLETE: CPT

## 2024-12-24 ENCOUNTER — APPOINTMENT (OUTPATIENT)
Dept: UROLOGY | Facility: CLINIC | Age: 69
End: 2024-12-24

## 2025-01-20 NOTE — DISCHARGE NOTE PROVIDER - [id number]_GEN_ALL_CORE
Attempt #1    LM for pt to call back if any further questions however provider out last week and out today.  Providers needs to answer this and so with further direction we will call pt and advise.   This document is complete and the patient is ready for discharge.

## 2025-08-17 ENCOUNTER — INPATIENT (INPATIENT)
Facility: HOSPITAL | Age: 70
LOS: 0 days | Discharge: HOME | DRG: 313 | End: 2025-08-18
Attending: HOSPITALIST | Admitting: STUDENT IN AN ORGANIZED HEALTH CARE EDUCATION/TRAINING PROGRAM
Payer: COMMERCIAL

## 2025-08-17 VITALS
SYSTOLIC BLOOD PRESSURE: 156 MMHG | RESPIRATION RATE: 18 BRPM | HEIGHT: 69 IN | HEART RATE: 89 BPM | OXYGEN SATURATION: 98 % | TEMPERATURE: 98 F | DIASTOLIC BLOOD PRESSURE: 95 MMHG | WEIGHT: 175.05 LBS

## 2025-08-17 DIAGNOSIS — N20.0 CALCULUS OF KIDNEY: Chronic | ICD-10-CM

## 2025-08-17 LAB
ALBUMIN SERPL ELPH-MCNC: 4.4 G/DL — SIGNIFICANT CHANGE UP (ref 3.3–5)
ALP SERPL-CCNC: 86 U/L — SIGNIFICANT CHANGE UP (ref 40–120)
ALT FLD-CCNC: 17 U/L — SIGNIFICANT CHANGE UP (ref 10–45)
ANION GAP SERPL CALC-SCNC: 13 MMOL/L — SIGNIFICANT CHANGE UP (ref 5–17)
APTT BLD: 26.9 SEC — SIGNIFICANT CHANGE UP (ref 26.1–36.8)
AST SERPL-CCNC: 23 U/L — SIGNIFICANT CHANGE UP (ref 10–40)
BASOPHILS # BLD AUTO: 0.04 K/UL — SIGNIFICANT CHANGE UP (ref 0–0.2)
BASOPHILS NFR BLD AUTO: 0.7 % — SIGNIFICANT CHANGE UP (ref 0–2)
BILIRUB SERPL-MCNC: 0.7 MG/DL — SIGNIFICANT CHANGE UP (ref 0.2–1.2)
BUN SERPL-MCNC: 17 MG/DL — SIGNIFICANT CHANGE UP (ref 7–23)
CALCIUM SERPL-MCNC: 9.6 MG/DL — SIGNIFICANT CHANGE UP (ref 8.4–10.5)
CHLORIDE SERPL-SCNC: 100 MMOL/L — SIGNIFICANT CHANGE UP (ref 96–108)
CO2 SERPL-SCNC: 24 MMOL/L — SIGNIFICANT CHANGE UP (ref 22–31)
CREAT SERPL-MCNC: 0.98 MG/DL — SIGNIFICANT CHANGE UP (ref 0.5–1.3)
EGFR: 83 ML/MIN/1.73M2 — SIGNIFICANT CHANGE UP
EGFR: 83 ML/MIN/1.73M2 — SIGNIFICANT CHANGE UP
EOSINOPHIL # BLD AUTO: 0.01 K/UL — SIGNIFICANT CHANGE UP (ref 0–0.5)
EOSINOPHIL NFR BLD AUTO: 0.2 % — SIGNIFICANT CHANGE UP (ref 0–6)
GLUCOSE SERPL-MCNC: 110 MG/DL — HIGH (ref 70–99)
HCT VFR BLD CALC: 41.3 % — SIGNIFICANT CHANGE UP (ref 39–50)
HGB BLD-MCNC: 11.9 G/DL — LOW (ref 13–17)
IMM GRANULOCYTES # BLD AUTO: 0.01 K/UL — SIGNIFICANT CHANGE UP (ref 0–0.07)
IMM GRANULOCYTES NFR BLD AUTO: 0.2 % — SIGNIFICANT CHANGE UP (ref 0–0.9)
INR BLD: 1.04 RATIO — SIGNIFICANT CHANGE UP (ref 0.85–1.16)
LYMPHOCYTES # BLD AUTO: 0.93 K/UL — LOW (ref 1–3.3)
LYMPHOCYTES NFR BLD AUTO: 17.4 % — SIGNIFICANT CHANGE UP (ref 13–44)
MCHC RBC-ENTMCNC: 22.7 PG — LOW (ref 27–34)
MCHC RBC-ENTMCNC: 28.8 G/DL — LOW (ref 32–36)
MCV RBC AUTO: 78.7 FL — LOW (ref 80–100)
MONOCYTES # BLD AUTO: 0.69 K/UL — SIGNIFICANT CHANGE UP (ref 0–0.9)
MONOCYTES NFR BLD AUTO: 12.9 % — SIGNIFICANT CHANGE UP (ref 2–14)
NEUTROPHILS # BLD AUTO: 3.68 K/UL — SIGNIFICANT CHANGE UP (ref 1.8–7.4)
NEUTROPHILS NFR BLD AUTO: 68.6 % — SIGNIFICANT CHANGE UP (ref 43–77)
NRBC # BLD AUTO: 0 K/UL — SIGNIFICANT CHANGE UP (ref 0–0)
NRBC # FLD: 0 K/UL — SIGNIFICANT CHANGE UP (ref 0–0)
NRBC BLD AUTO-RTO: 0 /100 WBCS — SIGNIFICANT CHANGE UP (ref 0–0)
NT-PROBNP SERPL-SCNC: 37 PG/ML — SIGNIFICANT CHANGE UP (ref 0–300)
PLATELET # BLD AUTO: 336 K/UL — SIGNIFICANT CHANGE UP (ref 150–400)
PMV BLD: 10.1 FL — SIGNIFICANT CHANGE UP (ref 7–13)
POTASSIUM SERPL-MCNC: 3.9 MMOL/L — SIGNIFICANT CHANGE UP (ref 3.5–5.3)
POTASSIUM SERPL-SCNC: 3.9 MMOL/L — SIGNIFICANT CHANGE UP (ref 3.5–5.3)
PROT SERPL-MCNC: 7.4 G/DL — SIGNIFICANT CHANGE UP (ref 6–8.3)
PROTHROM AB SERPL-ACNC: 12 SEC — SIGNIFICANT CHANGE UP (ref 9.9–13.4)
RBC # BLD: 5.25 M/UL — SIGNIFICANT CHANGE UP (ref 4.2–5.8)
RBC # FLD: 16.1 % — HIGH (ref 10.3–14.5)
SODIUM SERPL-SCNC: 137 MMOL/L — SIGNIFICANT CHANGE UP (ref 135–145)
TROPONIN T, HIGH SENSITIVITY RESULT: 12 NG/L — SIGNIFICANT CHANGE UP (ref 0–51)
TROPONIN T, HIGH SENSITIVITY RESULT: 17 NG/L — SIGNIFICANT CHANGE UP (ref 0–51)
WBC # BLD: 5.36 K/UL — SIGNIFICANT CHANGE UP (ref 3.8–10.5)
WBC # FLD AUTO: 5.36 K/UL — SIGNIFICANT CHANGE UP (ref 3.8–10.5)

## 2025-08-17 PROCEDURE — 93010 ELECTROCARDIOGRAM REPORT: CPT

## 2025-08-17 PROCEDURE — 99223 1ST HOSP IP/OBS HIGH 75: CPT

## 2025-08-17 PROCEDURE — 71045 X-RAY EXAM CHEST 1 VIEW: CPT | Mod: 26

## 2025-08-17 RX ORDER — ASPIRIN 325 MG
81 TABLET ORAL DAILY
Refills: 0 | Status: DISCONTINUED | OUTPATIENT
Start: 2025-08-18 | End: 2025-08-18

## 2025-08-17 RX ORDER — TICAGRELOR 90 MG/1
90 TABLET ORAL ONCE
Refills: 0 | Status: COMPLETED | OUTPATIENT
Start: 2025-08-18 | End: 2025-08-18

## 2025-08-17 RX ORDER — LOSARTAN POTASSIUM 100 MG/1
100 TABLET, FILM COATED ORAL DAILY
Refills: 0 | Status: DISCONTINUED | OUTPATIENT
Start: 2025-08-17 | End: 2025-08-18

## 2025-08-17 RX ORDER — ATORVASTATIN CALCIUM 80 MG/1
80 TABLET, FILM COATED ORAL AT BEDTIME
Refills: 0 | Status: DISCONTINUED | OUTPATIENT
Start: 2025-08-17 | End: 2025-08-18

## 2025-08-17 RX ORDER — ASPIRIN 325 MG
243 TABLET ORAL DAILY
Refills: 0 | Status: COMPLETED | OUTPATIENT
Start: 2025-08-17 | End: 2025-08-17

## 2025-08-17 RX ADMIN — Medication 243 MILLIGRAM(S): at 14:49

## 2025-08-17 RX ADMIN — ATORVASTATIN CALCIUM 80 MILLIGRAM(S): 80 TABLET, FILM COATED ORAL at 22:00

## 2025-08-17 RX ADMIN — LOSARTAN POTASSIUM 100 MILLIGRAM(S): 100 TABLET, FILM COATED ORAL at 19:18

## 2025-08-18 ENCOUNTER — RESULT REVIEW (OUTPATIENT)
Age: 70
End: 2025-08-18

## 2025-08-18 ENCOUNTER — TRANSCRIPTION ENCOUNTER (OUTPATIENT)
Age: 70
End: 2025-08-18

## 2025-08-18 VITALS
DIASTOLIC BLOOD PRESSURE: 64 MMHG | TEMPERATURE: 98 F | SYSTOLIC BLOOD PRESSURE: 110 MMHG | HEART RATE: 66 BPM | OXYGEN SATURATION: 98 %

## 2025-08-18 DIAGNOSIS — Z29.9 ENCOUNTER FOR PROPHYLACTIC MEASURES, UNSPECIFIED: ICD-10-CM

## 2025-08-18 DIAGNOSIS — R73.03 PREDIABETES: ICD-10-CM

## 2025-08-18 DIAGNOSIS — E78.5 HYPERLIPIDEMIA, UNSPECIFIED: ICD-10-CM

## 2025-08-18 DIAGNOSIS — R07.9 CHEST PAIN, UNSPECIFIED: ICD-10-CM

## 2025-08-18 DIAGNOSIS — I10 ESSENTIAL (PRIMARY) HYPERTENSION: ICD-10-CM

## 2025-08-18 DIAGNOSIS — I25.10 ATHEROSCLEROTIC HEART DISEASE OF NATIVE CORONARY ARTERY WITHOUT ANGINA PECTORIS: ICD-10-CM

## 2025-08-18 DIAGNOSIS — K21.9 GASTRO-ESOPHAGEAL REFLUX DISEASE WITHOUT ESOPHAGITIS: ICD-10-CM

## 2025-08-18 LAB
A1C WITH ESTIMATED AVERAGE GLUCOSE RESULT: 6 % — HIGH (ref 4–5.6)
CHOLEST SERPL-MCNC: 123 MG/DL — SIGNIFICANT CHANGE UP
ESTIMATED AVERAGE GLUCOSE: 126 MG/DL — HIGH (ref 68–114)
HDLC SERPL-MCNC: 53 MG/DL — SIGNIFICANT CHANGE UP
LDLC SERPL-MCNC: 56 MG/DL — SIGNIFICANT CHANGE UP
LIPID PNL WITH DIRECT LDL SERPL: 56 MG/DL — SIGNIFICANT CHANGE UP
NONHDLC SERPL-MCNC: 70 MG/DL — SIGNIFICANT CHANGE UP
TRIGL SERPL-MCNC: 68 MG/DL — SIGNIFICANT CHANGE UP
TROPONIN T, HIGH SENSITIVITY RESULT: 11 NG/L — SIGNIFICANT CHANGE UP (ref 0–51)

## 2025-08-18 PROCEDURE — C1769: CPT

## 2025-08-18 PROCEDURE — 99285 EMERGENCY DEPT VISIT HI MDM: CPT | Mod: 25

## 2025-08-18 PROCEDURE — 36415 COLL VENOUS BLD VENIPUNCTURE: CPT

## 2025-08-18 PROCEDURE — 93356 MYOCRD STRAIN IMG SPCKL TRCK: CPT

## 2025-08-18 PROCEDURE — 93010 ELECTROCARDIOGRAM REPORT: CPT | Mod: 77

## 2025-08-18 PROCEDURE — 93454 CORONARY ARTERY ANGIO S&I: CPT | Mod: 26,59

## 2025-08-18 PROCEDURE — 84484 ASSAY OF TROPONIN QUANT: CPT

## 2025-08-18 PROCEDURE — G0378: CPT

## 2025-08-18 PROCEDURE — 93454 CORONARY ARTERY ANGIO S&I: CPT | Mod: 59

## 2025-08-18 PROCEDURE — 80053 COMPREHEN METABOLIC PANEL: CPT

## 2025-08-18 PROCEDURE — 83880 ASSAY OF NATRIURETIC PEPTIDE: CPT

## 2025-08-18 PROCEDURE — 92928 PRQ TCAT PLMT NTRAC ST 1 LES: CPT | Mod: RC

## 2025-08-18 PROCEDURE — 85610 PROTHROMBIN TIME: CPT

## 2025-08-18 PROCEDURE — 93005 ELECTROCARDIOGRAM TRACING: CPT

## 2025-08-18 PROCEDURE — 82550 ASSAY OF CK (CPK): CPT

## 2025-08-18 PROCEDURE — 93306 TTE W/DOPPLER COMPLETE: CPT | Mod: 26

## 2025-08-18 PROCEDURE — 82553 CREATINE MB FRACTION: CPT

## 2025-08-18 PROCEDURE — 80061 LIPID PANEL: CPT

## 2025-08-18 PROCEDURE — 96375 TX/PRO/DX INJ NEW DRUG ADDON: CPT

## 2025-08-18 PROCEDURE — C9600: CPT | Mod: RC

## 2025-08-18 PROCEDURE — 71045 X-RAY EXAM CHEST 1 VIEW: CPT

## 2025-08-18 PROCEDURE — 96374 THER/PROPH/DIAG INJ IV PUSH: CPT

## 2025-08-18 PROCEDURE — 93010 ELECTROCARDIOGRAM REPORT: CPT

## 2025-08-18 PROCEDURE — C1894: CPT

## 2025-08-18 PROCEDURE — C1725: CPT

## 2025-08-18 PROCEDURE — C1887: CPT

## 2025-08-18 PROCEDURE — C1874: CPT

## 2025-08-18 PROCEDURE — 85730 THROMBOPLASTIN TIME PARTIAL: CPT

## 2025-08-18 PROCEDURE — 85025 COMPLETE CBC W/AUTO DIFF WBC: CPT

## 2025-08-18 PROCEDURE — 83036 HEMOGLOBIN GLYCOSYLATED A1C: CPT

## 2025-08-18 PROCEDURE — 99233 SBSQ HOSP IP/OBS HIGH 50: CPT

## 2025-08-18 PROCEDURE — 99152 MOD SED SAME PHYS/QHP 5/>YRS: CPT

## 2025-08-18 PROCEDURE — 93306 TTE W/DOPPLER COMPLETE: CPT

## 2025-08-18 PROCEDURE — 99236 HOSP IP/OBS SAME DATE HI 85: CPT

## 2025-08-18 RX ORDER — ONDANSETRON HCL/PF 4 MG/2 ML
4 VIAL (ML) INJECTION EVERY 8 HOURS
Refills: 0 | Status: DISCONTINUED | OUTPATIENT
Start: 2025-08-18 | End: 2025-08-18

## 2025-08-18 RX ORDER — TICAGRELOR 90 MG/1
90 TABLET ORAL EVERY 12 HOURS
Refills: 0 | Status: DISCONTINUED | OUTPATIENT
Start: 2025-08-18 | End: 2025-08-18

## 2025-08-18 RX ORDER — ALBUTEROL SULFATE 2.5 MG/3ML
2 VIAL, NEBULIZER (ML) INHALATION EVERY 6 HOURS
Refills: 0 | Status: DISCONTINUED | OUTPATIENT
Start: 2025-08-18 | End: 2025-08-18

## 2025-08-18 RX ORDER — METOPROLOL SUCCINATE 50 MG/1
100 TABLET, EXTENDED RELEASE ORAL DAILY
Refills: 0 | Status: DISCONTINUED | OUTPATIENT
Start: 2025-08-18 | End: 2025-08-18

## 2025-08-18 RX ORDER — METOPROLOL SUCCINATE 50 MG/1
1 TABLET, EXTENDED RELEASE ORAL
Refills: 0 | DISCHARGE

## 2025-08-18 RX ORDER — ONDANSETRON HCL/PF 4 MG/2 ML
4 VIAL (ML) INJECTION ONCE
Refills: 0 | Status: COMPLETED | OUTPATIENT
Start: 2025-08-18 | End: 2025-08-18

## 2025-08-18 RX ORDER — ACETAMINOPHEN 500 MG/5ML
650 LIQUID (ML) ORAL EVERY 6 HOURS
Refills: 0 | Status: DISCONTINUED | OUTPATIENT
Start: 2025-08-18 | End: 2025-08-18

## 2025-08-18 RX ORDER — LOSARTAN POTASSIUM AND HYDROCHLOROTHIAZIDE 12.5; 5 MG/1; MG/1
1 TABLET ORAL
Refills: 0 | DISCHARGE

## 2025-08-18 RX ORDER — LOSARTAN POTASSIUM 100 MG/1
100 TABLET, FILM COATED ORAL DAILY
Refills: 0 | Status: DISCONTINUED | OUTPATIENT
Start: 2025-08-19 | End: 2025-08-18

## 2025-08-18 RX ADMIN — LOSARTAN POTASSIUM 100 MILLIGRAM(S): 100 TABLET, FILM COATED ORAL at 05:51

## 2025-08-18 RX ADMIN — Medication 20 MILLIGRAM(S): at 07:40

## 2025-08-18 RX ADMIN — TICAGRELOR 90 MILLIGRAM(S): 90 TABLET ORAL at 08:29

## 2025-08-18 RX ADMIN — Medication 81 MILLIGRAM(S): at 08:29

## 2025-08-18 RX ADMIN — Medication 4 MILLIGRAM(S): at 07:35

## 2025-08-18 RX ADMIN — METOPROLOL SUCCINATE 100 MILLIGRAM(S): 50 TABLET, EXTENDED RELEASE ORAL at 09:26

## 2025-08-18 RX ADMIN — Medication 75 MILLILITER(S): at 08:55

## 2025-08-18 RX ADMIN — Medication 500 MILLILITER(S): at 08:29

## 2025-08-18 RX ADMIN — Medication 40 MILLIGRAM(S): at 09:27

## 2025-08-25 ENCOUNTER — NON-APPOINTMENT (OUTPATIENT)
Age: 70
End: 2025-08-25

## 2025-08-25 ENCOUNTER — APPOINTMENT (OUTPATIENT)
Dept: CARDIOLOGY | Facility: CLINIC | Age: 70
End: 2025-08-25
Payer: COMMERCIAL

## 2025-08-25 VITALS
HEART RATE: 56 BPM | HEIGHT: 69 IN | WEIGHT: 171.96 LBS | OXYGEN SATURATION: 99 % | DIASTOLIC BLOOD PRESSURE: 81 MMHG | TEMPERATURE: 97.3 F | SYSTOLIC BLOOD PRESSURE: 159 MMHG | BODY MASS INDEX: 25.47 KG/M2

## 2025-08-25 VITALS — DIASTOLIC BLOOD PRESSURE: 79 MMHG | SYSTOLIC BLOOD PRESSURE: 149 MMHG

## 2025-08-25 DIAGNOSIS — I10 ESSENTIAL (PRIMARY) HYPERTENSION: ICD-10-CM

## 2025-08-25 DIAGNOSIS — R14.0 ABDOMINAL DISTENSION (GASEOUS): ICD-10-CM

## 2025-08-25 DIAGNOSIS — R05.2 SUBACUTE COUGH: ICD-10-CM

## 2025-08-25 DIAGNOSIS — R00.0 TACHYCARDIA, UNSPECIFIED: ICD-10-CM

## 2025-08-25 DIAGNOSIS — I25.118 ATHEROSCLEROTIC HEART DISEASE OF NATIVE CORONARY ARTERY WITH OTHER FORMS OF ANGINA PECTORIS: ICD-10-CM

## 2025-08-25 DIAGNOSIS — I21.4 NON-ST ELEVATION (NSTEMI) MYOCARDIAL INFARCTION: ICD-10-CM

## 2025-08-25 DIAGNOSIS — Z87.39 PERSONAL HISTORY OF OTHER DISEASES OF THE MUSCULOSKELETAL SYSTEM AND CONNECTIVE TISSUE: ICD-10-CM

## 2025-08-25 DIAGNOSIS — Z86.79 PERSONAL HISTORY OF OTHER DISEASES OF THE CIRCULATORY SYSTEM: ICD-10-CM

## 2025-08-25 DIAGNOSIS — Z87.2 PERSONAL HISTORY OF DISEASES OF THE SKIN AND SUBCUTANEOUS TISSUE: ICD-10-CM

## 2025-08-25 DIAGNOSIS — R73.03 PREDIABETES.: ICD-10-CM

## 2025-08-25 PROCEDURE — G2211 COMPLEX E/M VISIT ADD ON: CPT | Mod: NC

## 2025-08-25 PROCEDURE — 99215 OFFICE O/P EST HI 40 MIN: CPT

## 2025-08-25 PROCEDURE — 93000 ELECTROCARDIOGRAM COMPLETE: CPT

## 2025-08-25 RX ORDER — LOSARTAN POTASSIUM AND HYDROCHLOROTHIAZIDE 25; 100 MG/1; MG/1
100-25 TABLET ORAL
Qty: 90 | Refills: 3 | Status: ACTIVE | COMMUNITY
Start: 2025-08-25

## 2025-08-25 RX ORDER — ASPIRIN 81 MG/1
81 TABLET, COATED ORAL
Refills: 3 | Status: ACTIVE | COMMUNITY
Start: 2025-08-25

## (undated) DEVICE — SYR CATH TIP 2 OZ

## (undated) DEVICE — FOLEY HOLDER STATLOCK 2 WAY ADULT

## (undated) DEVICE — ELCTR GROUNDING PAD ADULT COVIDIEN

## (undated) DEVICE — POSITIONER STRAP ARMBOARD VELCRO TS-30

## (undated) DEVICE — CANISTER DISPOSABLE THIN WALL 3000CC

## (undated) DEVICE — WARMING BLANKET UPPER ADULT

## (undated) DEVICE — SOL IRR BAG H2O 3000ML

## (undated) DEVICE — BAG URINE W METER 2L

## (undated) DEVICE — VENODYNE/SCD SLEEVE CALF MEDIUM

## (undated) DEVICE — LIJ/LIA-ADAPTER LCKNG ELLIK EVACUATING: Type: DURABLE MEDICAL EQUIPMENT

## (undated) DEVICE — BAR ROLLER FOR ELITE ELCTR

## (undated) DEVICE — SUCTION YANKAUER NO CONTROL VENT

## (undated) DEVICE — FOLEY CATH 3-WAY 20FR 30CC LATEX LUBRICATH

## (undated) DEVICE — SYR LUER LOK 50CC

## (undated) DEVICE — TUBING SUCTION CONN 6FT STERILE

## (undated) DEVICE — CABLE DAC ACTIVE CORD

## (undated) DEVICE — SYR LUER LOK 20CC

## (undated) DEVICE — SYR LUER LOK 5CC

## (undated) DEVICE — SOL IRR POUR H2O 1500ML

## (undated) DEVICE — TUBING SET TUR BLADDER IRRIGATION Y-TYPE 81"

## (undated) DEVICE — ELCTR HF RESECTION LOOP 24FR 30 DEG 0.35 WIRE

## (undated) DEVICE — TUBING LEVEL ONE NORMOFLO SET

## (undated) DEVICE — PACK CYSTO